# Patient Record
Sex: FEMALE | Race: WHITE | Employment: OTHER | URBAN - METROPOLITAN AREA
[De-identification: names, ages, dates, MRNs, and addresses within clinical notes are randomized per-mention and may not be internally consistent; named-entity substitution may affect disease eponyms.]

---

## 2017-01-04 ENCOUNTER — TELEPHONE (OUTPATIENT)
Dept: FAMILY MEDICINE CLINIC | Age: 32
End: 2017-01-04

## 2017-01-10 ENCOUNTER — OFFICE VISIT (OUTPATIENT)
Dept: PSYCHOLOGY | Age: 32
End: 2017-01-10

## 2017-01-10 DIAGNOSIS — F33.41 MAJOR DEPRESSIVE DISORDER, RECURRENT, IN PARTIAL REMISSION (HCC): Primary | ICD-10-CM

## 2017-01-10 DIAGNOSIS — F41.9 ANXIETY DISORDER, UNSPECIFIED TYPE: ICD-10-CM

## 2017-01-10 PROCEDURE — 90832 PSYTX W PT 30 MINUTES: CPT | Performed by: PSYCHOLOGIST

## 2017-01-10 ASSESSMENT — PATIENT HEALTH QUESTIONNAIRE - PHQ9
SUM OF ALL RESPONSES TO PHQ QUESTIONS 1-9: 8
2. FEELING DOWN, DEPRESSED OR HOPELESS: 1
SUM OF ALL RESPONSES TO PHQ9 QUESTIONS 1 & 2: 2
4. FEELING TIRED OR HAVING LITTLE ENERGY: 1
1. LITTLE INTEREST OR PLEASURE IN DOING THINGS: 1
10. IF YOU CHECKED OFF ANY PROBLEMS, HOW DIFFICULT HAVE THESE PROBLEMS MADE IT FOR YOU TO DO YOUR WORK, TAKE CARE OF THINGS AT HOME, OR GET ALONG WITH OTHER PEOPLE: 1
5. POOR APPETITE OR OVEREATING: 2
8. MOVING OR SPEAKING SO SLOWLY THAT OTHER PEOPLE COULD HAVE NOTICED. OR THE OPPOSITE, BEING SO FIGETY OR RESTLESS THAT YOU HAVE BEEN MOVING AROUND A LOT MORE THAN USUAL: 0
6. FEELING BAD ABOUT YOURSELF - OR THAT YOU ARE A FAILURE OR HAVE LET YOURSELF OR YOUR FAMILY DOWN: 0
9. THOUGHTS THAT YOU WOULD BE BETTER OFF DEAD, OR OF HURTING YOURSELF: 0
3. TROUBLE FALLING OR STAYING ASLEEP: 1
7. TROUBLE CONCENTRATING ON THINGS, SUCH AS READING THE NEWSPAPER OR WATCHING TELEVISION: 2

## 2017-02-11 DIAGNOSIS — M79.2 NEUROGENIC PAIN, LEG, UNSPECIFIED LATERALITY: ICD-10-CM

## 2017-02-11 RX ORDER — PREGABALIN 100 MG/1
100 CAPSULE ORAL 2 TIMES DAILY
Qty: 180 CAPSULE | Refills: 1 | Status: SHIPPED | OUTPATIENT
Start: 2017-02-11 | End: 2017-07-28 | Stop reason: SDUPTHER

## 2017-02-14 ENCOUNTER — OFFICE VISIT (OUTPATIENT)
Dept: FAMILY MEDICINE CLINIC | Age: 32
End: 2017-02-14

## 2017-02-14 VITALS
HEART RATE: 69 BPM | WEIGHT: 194.8 LBS | BODY MASS INDEX: 32.42 KG/M2 | DIASTOLIC BLOOD PRESSURE: 86 MMHG | TEMPERATURE: 97.4 F | SYSTOLIC BLOOD PRESSURE: 122 MMHG | OXYGEN SATURATION: 99 %

## 2017-02-14 DIAGNOSIS — J01.10 ACUTE NON-RECURRENT FRONTAL SINUSITIS: Primary | ICD-10-CM

## 2017-02-14 DIAGNOSIS — F33.1 MAJOR DEPRESSIVE DISORDER, RECURRENT, MODERATE (HCC): ICD-10-CM

## 2017-02-14 DIAGNOSIS — S76.311A HAMSTRING STRAIN, RIGHT, INITIAL ENCOUNTER: ICD-10-CM

## 2017-02-14 DIAGNOSIS — T75.3XXA MOTION SICKNESS, INITIAL ENCOUNTER: ICD-10-CM

## 2017-02-14 DIAGNOSIS — G61.0 AIDP (ACUTE INFLAMMATORY DEMYELINATING POLYNEUROPATHY) (HCC): ICD-10-CM

## 2017-02-14 PROCEDURE — 99214 OFFICE O/P EST MOD 30 MIN: CPT | Performed by: FAMILY MEDICINE

## 2017-02-14 RX ORDER — SCOLOPAMINE TRANSDERMAL SYSTEM 1 MG/1
1 PATCH, EXTENDED RELEASE TRANSDERMAL
Qty: 4 PATCH | Refills: 1 | Status: SHIPPED | OUTPATIENT
Start: 2017-02-14 | End: 2017-05-16

## 2017-02-14 RX ORDER — SULFAMETHOXAZOLE AND TRIMETHOPRIM 800; 160 MG/1; MG/1
1 TABLET ORAL 2 TIMES DAILY
Qty: 20 TABLET | Refills: 0 | Status: SHIPPED | OUTPATIENT
Start: 2017-02-14 | End: 2017-03-13 | Stop reason: SDUPTHER

## 2017-02-19 ASSESSMENT — ENCOUNTER SYMPTOMS
GASTROINTESTINAL NEGATIVE: 1
EYES NEGATIVE: 1
RESPIRATORY NEGATIVE: 1

## 2017-02-28 ENCOUNTER — OFFICE VISIT (OUTPATIENT)
Dept: PSYCHOLOGY | Age: 32
End: 2017-02-28

## 2017-02-28 DIAGNOSIS — F41.9 ANXIETY DISORDER, UNSPECIFIED TYPE: ICD-10-CM

## 2017-02-28 DIAGNOSIS — F33.41 MAJOR DEPRESSIVE DISORDER, RECURRENT, IN PARTIAL REMISSION (HCC): Primary | ICD-10-CM

## 2017-02-28 PROCEDURE — 90832 PSYTX W PT 30 MINUTES: CPT | Performed by: PSYCHOLOGIST

## 2017-02-28 ASSESSMENT — PATIENT HEALTH QUESTIONNAIRE - PHQ9
9. THOUGHTS THAT YOU WOULD BE BETTER OFF DEAD, OR OF HURTING YOURSELF: 0
6. FEELING BAD ABOUT YOURSELF - OR THAT YOU ARE A FAILURE OR HAVE LET YOURSELF OR YOUR FAMILY DOWN: 0
3. TROUBLE FALLING OR STAYING ASLEEP: 1
8. MOVING OR SPEAKING SO SLOWLY THAT OTHER PEOPLE COULD HAVE NOTICED. OR THE OPPOSITE, BEING SO FIGETY OR RESTLESS THAT YOU HAVE BEEN MOVING AROUND A LOT MORE THAN USUAL: 0
SUM OF ALL RESPONSES TO PHQ QUESTIONS 1-9: 6
1. LITTLE INTEREST OR PLEASURE IN DOING THINGS: 1
2. FEELING DOWN, DEPRESSED OR HOPELESS: 1
SUM OF ALL RESPONSES TO PHQ9 QUESTIONS 1 & 2: 2
4. FEELING TIRED OR HAVING LITTLE ENERGY: 1
7. TROUBLE CONCENTRATING ON THINGS, SUCH AS READING THE NEWSPAPER OR WATCHING TELEVISION: 2
5. POOR APPETITE OR OVEREATING: 0

## 2017-03-13 ENCOUNTER — PATIENT MESSAGE (OUTPATIENT)
Dept: FAMILY MEDICINE CLINIC | Age: 32
End: 2017-03-13

## 2017-03-13 DIAGNOSIS — J01.10 ACUTE NON-RECURRENT FRONTAL SINUSITIS: ICD-10-CM

## 2017-03-13 RX ORDER — SULFAMETHOXAZOLE AND TRIMETHOPRIM 800; 160 MG/1; MG/1
1 TABLET ORAL 2 TIMES DAILY
Qty: 20 TABLET | Refills: 0 | Status: SHIPPED | OUTPATIENT
Start: 2017-03-13 | End: 2017-03-23

## 2017-04-04 ENCOUNTER — OFFICE VISIT (OUTPATIENT)
Dept: PSYCHOLOGY | Age: 32
End: 2017-04-04

## 2017-04-04 DIAGNOSIS — F41.9 ANXIETY DISORDER, UNSPECIFIED TYPE: ICD-10-CM

## 2017-04-04 DIAGNOSIS — F33.41 MAJOR DEPRESSIVE DISORDER, RECURRENT, IN PARTIAL REMISSION (HCC): Primary | ICD-10-CM

## 2017-04-04 PROCEDURE — 90832 PSYTX W PT 30 MINUTES: CPT | Performed by: PSYCHOLOGIST

## 2017-04-04 ASSESSMENT — PATIENT HEALTH QUESTIONNAIRE - PHQ9
2. FEELING DOWN, DEPRESSED OR HOPELESS: 0
SUM OF ALL RESPONSES TO PHQ9 QUESTIONS 1 & 2: 0
4. FEELING TIRED OR HAVING LITTLE ENERGY: 1
10. IF YOU CHECKED OFF ANY PROBLEMS, HOW DIFFICULT HAVE THESE PROBLEMS MADE IT FOR YOU TO DO YOUR WORK, TAKE CARE OF THINGS AT HOME, OR GET ALONG WITH OTHER PEOPLE: 0
1. LITTLE INTEREST OR PLEASURE IN DOING THINGS: 0
SUM OF ALL RESPONSES TO PHQ QUESTIONS 1-9: 4
7. TROUBLE CONCENTRATING ON THINGS, SUCH AS READING THE NEWSPAPER OR WATCHING TELEVISION: 1
9. THOUGHTS THAT YOU WOULD BE BETTER OFF DEAD, OR OF HURTING YOURSELF: 0
8. MOVING OR SPEAKING SO SLOWLY THAT OTHER PEOPLE COULD HAVE NOTICED. OR THE OPPOSITE, BEING SO FIGETY OR RESTLESS THAT YOU HAVE BEEN MOVING AROUND A LOT MORE THAN USUAL: 0
3. TROUBLE FALLING OR STAYING ASLEEP: 1
5. POOR APPETITE OR OVEREATING: 1
6. FEELING BAD ABOUT YOURSELF - OR THAT YOU ARE A FAILURE OR HAVE LET YOURSELF OR YOUR FAMILY DOWN: 0

## 2017-04-21 DIAGNOSIS — F33.1 MAJOR DEPRESSIVE DISORDER, RECURRENT, MODERATE (HCC): ICD-10-CM

## 2017-04-21 RX ORDER — SERTRALINE HYDROCHLORIDE 25 MG/1
25 TABLET, FILM COATED ORAL DAILY
Qty: 90 TABLET | Refills: 1 | Status: SHIPPED | OUTPATIENT
Start: 2017-04-21 | End: 2017-10-30 | Stop reason: SDUPTHER

## 2017-05-16 ENCOUNTER — OFFICE VISIT (OUTPATIENT)
Dept: FAMILY MEDICINE CLINIC | Age: 32
End: 2017-05-16

## 2017-05-16 VITALS
BODY MASS INDEX: 32.18 KG/M2 | DIASTOLIC BLOOD PRESSURE: 80 MMHG | TEMPERATURE: 97.8 F | WEIGHT: 193.4 LBS | SYSTOLIC BLOOD PRESSURE: 124 MMHG

## 2017-05-16 DIAGNOSIS — G61.0 AIDP (ACUTE INFLAMMATORY DEMYELINATING POLYNEUROPATHY) (HCC): ICD-10-CM

## 2017-05-16 DIAGNOSIS — F33.41 MAJOR DEPRESSIVE DISORDER, RECURRENT, IN PARTIAL REMISSION (HCC): ICD-10-CM

## 2017-05-16 DIAGNOSIS — M51.36 DEGENERATIVE DISC DISEASE, LUMBAR: Primary | ICD-10-CM

## 2017-05-16 DIAGNOSIS — J30.1 SEASONAL ALLERGIC RHINITIS DUE TO POLLEN: ICD-10-CM

## 2017-05-16 PROCEDURE — 99214 OFFICE O/P EST MOD 30 MIN: CPT | Performed by: FAMILY MEDICINE

## 2017-05-16 RX ORDER — FLUTICASONE PROPIONATE 50 MCG
2 SPRAY, SUSPENSION (ML) NASAL DAILY
Qty: 1 BOTTLE | Refills: 3 | Status: SHIPPED | OUTPATIENT
Start: 2017-05-16 | End: 2018-02-28

## 2017-05-17 ASSESSMENT — ENCOUNTER SYMPTOMS
RESPIRATORY NEGATIVE: 1
EYES NEGATIVE: 1
GASTROINTESTINAL NEGATIVE: 1

## 2017-05-31 DIAGNOSIS — J45.30 REACTIVE AIRWAY DISEASE, MILD PERSISTENT, UNCOMPLICATED: Primary | ICD-10-CM

## 2017-05-31 RX ORDER — ALBUTEROL SULFATE 90 UG/1
2 AEROSOL, METERED RESPIRATORY (INHALATION) EVERY 6 HOURS PRN
Qty: 3 INHALER | Refills: 1 | Status: SHIPPED | OUTPATIENT
Start: 2017-05-31 | End: 2017-12-26 | Stop reason: SDUPTHER

## 2017-07-28 DIAGNOSIS — M79.2 NEUROGENIC PAIN, LEG, UNSPECIFIED LATERALITY: ICD-10-CM

## 2017-07-28 RX ORDER — PREGABALIN 100 MG/1
100 CAPSULE ORAL 2 TIMES DAILY
Qty: 60 CAPSULE | Refills: 0 | Status: SHIPPED | OUTPATIENT
Start: 2017-07-28 | End: 2017-10-08 | Stop reason: SDUPTHER

## 2017-07-31 ENCOUNTER — OFFICE VISIT (OUTPATIENT)
Dept: FAMILY MEDICINE CLINIC | Age: 32
End: 2017-07-31

## 2017-07-31 VITALS
BODY MASS INDEX: 36.29 KG/M2 | DIASTOLIC BLOOD PRESSURE: 80 MMHG | OXYGEN SATURATION: 97 % | WEIGHT: 204.8 LBS | HEIGHT: 63 IN | SYSTOLIC BLOOD PRESSURE: 120 MMHG | TEMPERATURE: 97.5 F | HEART RATE: 98 BPM

## 2017-07-31 DIAGNOSIS — M79.2 NEUROGENIC PAIN, LEG, UNSPECIFIED LATERALITY: Primary | ICD-10-CM

## 2017-07-31 PROCEDURE — 99213 OFFICE O/P EST LOW 20 MIN: CPT | Performed by: NURSE PRACTITIONER

## 2017-07-31 ASSESSMENT — ENCOUNTER SYMPTOMS
RESPIRATORY NEGATIVE: 1
EYES NEGATIVE: 1
ALLERGIC/IMMUNOLOGIC NEGATIVE: 1
GASTROINTESTINAL NEGATIVE: 1

## 2017-10-08 DIAGNOSIS — M79.2 NEUROGENIC PAIN, LEG, UNSPECIFIED LATERALITY: ICD-10-CM

## 2017-10-09 RX ORDER — PREGABALIN 100 MG/1
100 CAPSULE ORAL 2 TIMES DAILY
Qty: 60 CAPSULE | Refills: 0 | Status: SHIPPED | OUTPATIENT
Start: 2017-10-09 | End: 2018-02-28

## 2017-10-23 ENCOUNTER — TELEPHONE (OUTPATIENT)
Dept: FAMILY MEDICINE CLINIC | Age: 32
End: 2017-10-23

## 2017-10-26 ENCOUNTER — TELEPHONE (OUTPATIENT)
Dept: FAMILY MEDICINE CLINIC | Age: 32
End: 2017-10-26

## 2017-10-30 DIAGNOSIS — F33.1 MAJOR DEPRESSIVE DISORDER, RECURRENT, MODERATE (HCC): ICD-10-CM

## 2017-10-30 RX ORDER — SERTRALINE HYDROCHLORIDE 25 MG/1
25 TABLET, FILM COATED ORAL DAILY
Qty: 90 TABLET | Refills: 1 | Status: SHIPPED | OUTPATIENT
Start: 2017-10-30 | End: 2018-05-11 | Stop reason: SDUPTHER

## 2017-10-30 NOTE — TELEPHONE ENCOUNTER
From: Zehra May  Sent: 10/30/2017 1:29 PM EDT  Subject: Medication Renewal Request    Myrna Sterling. Nguyen Rosales would like a refill of the following medications:  sertraline (ZOLOFT) 25 MG tablet Julianna Cowart MD]    Preferred pharmacy: [address] [place] [address] [phone] - F [phone]    Comment:  I didn't realize I was out of refills and I am completely out. Sorry! Thank you! I have also transferred this rx to the [facility] on [address][phone]. They should be sending over a request as well. Thank you!

## 2017-12-18 ENCOUNTER — TELEPHONE (OUTPATIENT)
Dept: FAMILY MEDICINE CLINIC | Age: 32
End: 2017-12-18

## 2017-12-18 RX ORDER — SULFAMETHOXAZOLE AND TRIMETHOPRIM 800; 160 MG/1; MG/1
1 TABLET ORAL 2 TIMES DAILY
Qty: 20 TABLET | Refills: 0 | Status: SHIPPED | OUTPATIENT
Start: 2017-12-18 | End: 2017-12-28

## 2017-12-26 DIAGNOSIS — J45.30 REACTIVE AIRWAY DISEASE, MILD PERSISTENT, UNCOMPLICATED: ICD-10-CM

## 2017-12-26 RX ORDER — ALBUTEROL SULFATE 90 UG/1
2 AEROSOL, METERED RESPIRATORY (INHALATION) EVERY 6 HOURS PRN
Qty: 3 INHALER | Refills: 1 | Status: SHIPPED | OUTPATIENT
Start: 2017-12-26 | End: 2018-11-13 | Stop reason: SDUPTHER

## 2017-12-26 NOTE — TELEPHONE ENCOUNTER
From: Becca Aponte  Sent: 12/26/2017 11:49 AM EST  Subject: Medication Renewal Request    Dank Robertson.  Tish Fairbanks would like a refill of the following medications:  albuterol sulfate HFA (PROVENTIL HFA) 108 (90 BASE) MCG/ACT inhaler Juanjose Interiano MD]    Preferred pharmacy: [address] [phone] - F [phone]    Comment:

## 2018-02-26 ENCOUNTER — TELEPHONE (OUTPATIENT)
Dept: FAMILY MEDICINE CLINIC | Age: 33
End: 2018-02-26

## 2018-02-26 DIAGNOSIS — S82.899A: Primary | ICD-10-CM

## 2018-02-27 ENCOUNTER — OFFICE VISIT (OUTPATIENT)
Dept: ORTHOPEDIC SURGERY | Age: 33
End: 2018-02-27

## 2018-02-27 ENCOUNTER — TELEPHONE (OUTPATIENT)
Dept: ORTHOPEDIC SURGERY | Age: 33
End: 2018-02-27

## 2018-02-27 VITALS
HEIGHT: 63 IN | BODY MASS INDEX: 36.21 KG/M2 | WEIGHT: 204.34 LBS | SYSTOLIC BLOOD PRESSURE: 131 MMHG | DIASTOLIC BLOOD PRESSURE: 74 MMHG | HEART RATE: 78 BPM

## 2018-02-27 DIAGNOSIS — S82.851A TRIMALLEOLAR FRACTURE OF ANKLE, CLOSED, RIGHT, INITIAL ENCOUNTER: ICD-10-CM

## 2018-02-27 DIAGNOSIS — M25.571 ACUTE RIGHT ANKLE PAIN: Primary | ICD-10-CM

## 2018-02-27 PROCEDURE — G8417 CALC BMI ABV UP PARAM F/U: HCPCS | Performed by: PODIATRIST

## 2018-02-27 PROCEDURE — 29515 APPLICATION SHORT LEG SPLINT: CPT | Performed by: PODIATRIST

## 2018-02-27 PROCEDURE — L4360 PNEUMAT WALKING BOOT PRE CST: HCPCS | Performed by: PODIATRIST

## 2018-02-27 PROCEDURE — 1036F TOBACCO NON-USER: CPT | Performed by: PODIATRIST

## 2018-02-27 PROCEDURE — G8427 DOCREV CUR MEDS BY ELIG CLIN: HCPCS | Performed by: PODIATRIST

## 2018-02-27 PROCEDURE — 99203 OFFICE O/P NEW LOW 30 MIN: CPT | Performed by: PODIATRIST

## 2018-02-27 PROCEDURE — G8484 FLU IMMUNIZE NO ADMIN: HCPCS | Performed by: PODIATRIST

## 2018-02-28 ENCOUNTER — OFFICE VISIT (OUTPATIENT)
Dept: FAMILY MEDICINE CLINIC | Age: 33
End: 2018-02-28

## 2018-02-28 ENCOUNTER — TELEPHONE (OUTPATIENT)
Dept: FAMILY MEDICINE CLINIC | Age: 33
End: 2018-02-28

## 2018-02-28 VITALS
OXYGEN SATURATION: 96 % | HEART RATE: 101 BPM | TEMPERATURE: 98.4 F | DIASTOLIC BLOOD PRESSURE: 60 MMHG | HEIGHT: 65 IN | WEIGHT: 190 LBS | SYSTOLIC BLOOD PRESSURE: 120 MMHG | BODY MASS INDEX: 31.65 KG/M2

## 2018-02-28 DIAGNOSIS — J30.1 SEASONAL ALLERGIC RHINITIS DUE TO POLLEN, UNSPECIFIED CHRONICITY: ICD-10-CM

## 2018-02-28 DIAGNOSIS — G61.0 GUILLAIN BARRÉ SYNDROME (HCC): ICD-10-CM

## 2018-02-28 DIAGNOSIS — F33.41 MAJOR DEPRESSIVE DISORDER, RECURRENT, IN PARTIAL REMISSION (HCC): ICD-10-CM

## 2018-02-28 DIAGNOSIS — S82.851A TRIMALLEOLAR FRACTURE OF ANKLE, CLOSED, RIGHT, INITIAL ENCOUNTER: ICD-10-CM

## 2018-02-28 DIAGNOSIS — F41.9 ANXIETY DISORDER, UNSPECIFIED TYPE: ICD-10-CM

## 2018-02-28 DIAGNOSIS — M79.2 NEUROGENIC PAIN OF LOWER EXTREMITY, UNSPECIFIED LATERALITY: ICD-10-CM

## 2018-02-28 DIAGNOSIS — J45.909 REACTIVE AIRWAY DISEASE WITHOUT COMPLICATION, UNSPECIFIED ASTHMA SEVERITY, UNSPECIFIED WHETHER PERSISTENT: ICD-10-CM

## 2018-02-28 DIAGNOSIS — Z01.818 PREOP EXAMINATION: Primary | ICD-10-CM

## 2018-02-28 DIAGNOSIS — R56.9 SEIZURE (HCC): ICD-10-CM

## 2018-02-28 DIAGNOSIS — I44.7 LBBB (LEFT BUNDLE BRANCH BLOCK): ICD-10-CM

## 2018-02-28 DIAGNOSIS — G61.0 AIDP (ACUTE INFLAMMATORY DEMYELINATING POLYNEUROPATHY) (HCC): ICD-10-CM

## 2018-02-28 DIAGNOSIS — R05.9 COUGH: ICD-10-CM

## 2018-02-28 RX ORDER — ONDANSETRON 4 MG/1
4 TABLET, FILM COATED ORAL EVERY 8 HOURS PRN
COMMUNITY
End: 2018-11-13

## 2018-02-28 RX ORDER — OXYCODONE HYDROCHLORIDE AND ACETAMINOPHEN 5; 325 MG/1; MG/1
1 TABLET ORAL EVERY 4 HOURS PRN
Qty: 10 TABLET | Refills: 0 | Status: SHIPPED | OUTPATIENT
Start: 2018-02-28 | End: 2018-03-02 | Stop reason: HOSPADM

## 2018-02-28 RX ORDER — OXYCODONE HYDROCHLORIDE AND ACETAMINOPHEN 5; 325 MG/1; MG/1
1 TABLET ORAL EVERY 4 HOURS PRN
COMMUNITY
End: 2018-02-28 | Stop reason: SDUPTHER

## 2018-02-28 RX ORDER — BROMPHENIRAMINE MALEATE, PSEUDOEPHEDRINE HYDROCHLORIDE, AND DEXTROMETHORPHAN HYDROBROMIDE 2; 30; 10 MG/5ML; MG/5ML; MG/5ML
5 SYRUP ORAL 4 TIMES DAILY PRN
Qty: 200 ML | Refills: 0 | Status: SHIPPED | OUTPATIENT
Start: 2018-02-28 | End: 2018-11-13

## 2018-02-28 NOTE — PROGRESS NOTES
Subjective:      Roldan Bowman is a 29 y.o.  female who presents to the office today for a preoperative consultation at the request of surgeon Chung Martinez who plans on performing OPEN REDUCTION INTERNAL FIXATION TRIMALLEOLAR FRACTURE RIGHT ANKLE  on March 2, friday. This consultation is requested for the specific conditions prompting preoperative evaluation (i.e. because of potential affect on operative risk): anesthesia. Planned anesthesia is General.  The patient has the following known anesthesia issues: none  Patient has a bleeding risk of : no recent abnormal bleeding.   Past Medical History:   Diagnosis Date    Chicken pox     Degenerative disc disease, lumbar     Guillain Barré syndrome ([facility] 8/4/2016    Kidney disease     Meniere's disease     Seizures ([facility]     Trimalleolar fracture of ankle, closed, right, initial encounter 2/27/2018     Patient Active Problem List    Diagnosis Date Noted    Trimalleolar fracture of ankle, closed, right, initial encounter 02/27/2018    Major depressive disorder, recurrent, in partial remission ([address] 08/19/2016    Anxiety disorder 08/19/2016    AIDP (acute inflammatory demyelinating polyneuropathy) ([address] 08/04/2016    Guillain Barré syndrome ([address] 08/04/2016    Ataxia of both legs 09/09/2015    Neurogenic pain, leg 09/09/2015    LBBB (left bundle branch block) 08/12/2015    Racing heart beat 08/12/2015    Discogenic low back pain 01/20/2014    Degenerative disc disease, lumbar     Seizure ([address] 05/07/2012    Vitamin D deficiency 02/11/2012    Meniere disease 02/10/2012    Hyperlipemia 07/29/2010    Routine general medical examination at a health care facility 07/29/2010    Reactive airway disease 07/29/2010    Allergic rhinitis due to pollen 07/29/2010    Eczema 07/29/2010     Past Surgical History:   Procedure Laterality Date    TONSILLECTOMY AND ADENOIDECTOMY  1994    WISDOM TOOTH EXTRACTION       Family History   Problem Relation atraumatic   Eyes:  PERRL, conjunctiva/corneas clear, EOM's intact, fundi benign, both eyes   Ears:  Normal TM's and external ear canals, both ears   Nose: Nares normal, septum midline,mucosa normal, no drainage or sinus tenderness   Throat: Lips, mucosa, and tongue normal; teeth and gums normal   Neck: Supple, symmetrical, trachea midline, no adenopathy;  thyroid: not enlarged, symmetric, no tenderness/mass/nodules; no carotid bruit or JVD   Back:   Symmetric, no curvature, ROM normal, no CVA tenderness   Lungs:   Clear to auscultation bilaterally, respirations unlabored   Heart:  Regular rate and rhythm, S1 and S2 normal, no murmur, rub, or gallop   Abdomen:   Soft, non-tender, bowel sounds active all four quadrants,  no masses, no organomegaly   Pelvic: Deferred   Extremities: Extremities normal, atraumatic, no cyanosis or edema, RLE in boot, toes pink and warm   Pulses: 2+ and symmetric   Skin: Skin color, texture, turgor normal, no rashes or lesions   Lymph nodes: Cervical, supraclavicular, and axillary nodes normal   Neurologic: Normal           Assessment:      29 y.o. female with planned surgery as above. Plan:         1. Preop examination  Cleared for surgery    2. Trimalleolar fracture of ankle, closed, right, initial encounter  The condition has deteriorated. It has failed conservative management and needs definitive surgical repair. I recommended that the patient wash entire body with Hibiclens soap daily starting three days before surgery. oxyCODONE-acetaminophen (PERCOCET) 5-325 MG per tablet   3. Reactive airway disease without complication, unspecified asthma severity, unspecified whether persistent  Stable on current therapy, will monitor      4. Cough  Stable at this time, will monitor   brompheniramine-pseudoephedrine-DM 30-2-10 MG/5ML syrup   5. Seizure ([address]  Stable at this time, will monitor      6.  Seasonal allergic rhinitis due to pollen, unspecified chronicity  Stable at this time,

## 2018-03-02 ENCOUNTER — HOSPITAL ENCOUNTER (OUTPATIENT)
Dept: SURGERY | Age: 33
Discharge: HOME | End: 2018-03-02
Attending: PODIATRIST | Admitting: PODIATRIST

## 2018-03-02 VITALS
RESPIRATION RATE: 18 BRPM | TEMPERATURE: 98.4 F | SYSTOLIC BLOOD PRESSURE: 151 MMHG | HEART RATE: 100 BPM | OXYGEN SATURATION: 92 % | DIASTOLIC BLOOD PRESSURE: 94 MMHG

## 2018-03-02 DIAGNOSIS — S82.841A BIMALLEOLAR ANKLE FRACTURE, RIGHT, CLOSED, INITIAL ENCOUNTER: Primary | ICD-10-CM

## 2018-03-02 LAB — PREGNANCY, URINE: NEGATIVE

## 2018-03-02 RX ORDER — LABETALOL HYDROCHLORIDE 5 MG/ML
5 INJECTION, SOLUTION INTRAVENOUS EVERY 10 MIN PRN
Status: DISCONTINUED | OUTPATIENT
Start: 2018-03-02 | End: 2018-03-03 | Stop reason: HOSPADM

## 2018-03-02 RX ORDER — OXYCODONE HYDROCHLORIDE AND ACETAMINOPHEN 5; 325 MG/1; MG/1
TABLET ORAL
Status: COMPLETED
Start: 2018-03-02 | End: 2018-03-02

## 2018-03-02 RX ORDER — OXYCODONE HYDROCHLORIDE AND ACETAMINOPHEN 5; 325 MG/1; MG/1
1 TABLET ORAL PRN
Status: COMPLETED | OUTPATIENT
Start: 2018-03-02 | End: 2018-03-02

## 2018-03-02 RX ORDER — BENZONATATE 100 MG/1
100 CAPSULE ORAL 3 TIMES DAILY PRN
Status: DISCONTINUED | OUTPATIENT
Start: 2018-03-02 | End: 2018-03-02

## 2018-03-02 RX ORDER — HYDRALAZINE HYDROCHLORIDE 20 MG/ML
5 INJECTION INTRAMUSCULAR; INTRAVENOUS EVERY 10 MIN PRN
Status: DISCONTINUED | OUTPATIENT
Start: 2018-03-02 | End: 2018-03-03 | Stop reason: HOSPADM

## 2018-03-02 RX ORDER — OXYCODONE HYDROCHLORIDE AND ACETAMINOPHEN 5; 325 MG/1; MG/1
2 TABLET ORAL PRN
Status: COMPLETED | OUTPATIENT
Start: 2018-03-02 | End: 2018-03-02

## 2018-03-02 RX ORDER — SODIUM CHLORIDE, SODIUM LACTATE, POTASSIUM CHLORIDE, CALCIUM CHLORIDE 600; 310; 30; 20 MG/100ML; MG/100ML; MG/100ML; MG/100ML
INJECTION, SOLUTION INTRAVENOUS CONTINUOUS
Status: DISCONTINUED | OUTPATIENT
Start: 2018-03-02 | End: 2018-03-03 | Stop reason: HOSPADM

## 2018-03-02 RX ORDER — DIPHENHYDRAMINE HYDROCHLORIDE 50 MG/ML
12.5 INJECTION INTRAMUSCULAR; INTRAVENOUS
Status: ACTIVE | OUTPATIENT
Start: 2018-03-02 | End: 2018-03-02

## 2018-03-02 RX ORDER — MORPHINE SULFATE 2 MG/ML
1 INJECTION, SOLUTION INTRAMUSCULAR; INTRAVENOUS EVERY 5 MIN PRN
Status: DISCONTINUED | OUTPATIENT
Start: 2018-03-02 | End: 2018-03-03 | Stop reason: HOSPADM

## 2018-03-02 RX ORDER — MIDAZOLAM HYDROCHLORIDE 1 MG/ML
0.5 INJECTION INTRAMUSCULAR; INTRAVENOUS
Status: DISCONTINUED | OUTPATIENT
Start: 2018-03-02 | End: 2018-03-02 | Stop reason: ALTCHOICE

## 2018-03-02 RX ORDER — MIDAZOLAM HYDROCHLORIDE 1 MG/ML
INJECTION INTRAMUSCULAR; INTRAVENOUS
Status: DISCONTINUED
Start: 2018-03-02 | End: 2018-03-03 | Stop reason: HOSPADM

## 2018-03-02 RX ORDER — MEPERIDINE HYDROCHLORIDE 50 MG/ML
12.5 INJECTION INTRAMUSCULAR; INTRAVENOUS; SUBCUTANEOUS EVERY 5 MIN PRN
Status: DISCONTINUED | OUTPATIENT
Start: 2018-03-02 | End: 2018-03-03 | Stop reason: HOSPADM

## 2018-03-02 RX ORDER — PROMETHAZINE HYDROCHLORIDE 25 MG/1
25 TABLET ORAL EVERY 6 HOURS PRN
Qty: 30 TABLET | Refills: 0 | Status: SHIPPED | OUTPATIENT
Start: 2018-03-02 | End: 2018-03-09

## 2018-03-02 RX ORDER — OXYCODONE HYDROCHLORIDE AND ACETAMINOPHEN 5; 325 MG/1; MG/1
1 TABLET ORAL EVERY 4 HOURS PRN
Qty: 24 TABLET | Refills: 0 | Status: SHIPPED | OUTPATIENT
Start: 2018-03-02 | End: 2018-03-06

## 2018-03-02 RX ORDER — ONDANSETRON 2 MG/ML
4 INJECTION INTRAMUSCULAR; INTRAVENOUS
Status: ACTIVE | OUTPATIENT
Start: 2018-03-02 | End: 2018-03-02

## 2018-03-02 RX ORDER — MORPHINE SULFATE 2 MG/ML
2 INJECTION, SOLUTION INTRAMUSCULAR; INTRAVENOUS EVERY 5 MIN PRN
Status: DISCONTINUED | OUTPATIENT
Start: 2018-03-02 | End: 2018-03-03 | Stop reason: HOSPADM

## 2018-03-02 RX ORDER — BENZONATATE 100 MG/1
100 CAPSULE ORAL ONCE
Status: COMPLETED | OUTPATIENT
Start: 2018-03-02 | End: 2018-03-02

## 2018-03-02 RX ADMIN — SODIUM CHLORIDE, SODIUM LACTATE, POTASSIUM CHLORIDE, CALCIUM CHLORIDE: 600; 310; 30; 20 INJECTION, SOLUTION INTRAVENOUS at 09:30

## 2018-03-02 RX ADMIN — BENZONATATE 100 MG: 100 CAPSULE ORAL at 10:49

## 2018-03-02 RX ADMIN — OXYCODONE HYDROCHLORIDE AND ACETAMINOPHEN 2 TABLET: 5; 325 TABLET ORAL at 14:52

## 2018-03-02 RX ADMIN — Medication 0.5 MG: at 13:44

## 2018-03-02 RX ADMIN — Medication 0.5 MG: at 13:32

## 2018-03-02 RX ADMIN — MIDAZOLAM HYDROCHLORIDE 0.5 MG: 1 INJECTION INTRAMUSCULAR; INTRAVENOUS at 14:09

## 2018-03-02 RX ADMIN — Medication 0.5 MG: at 13:47

## 2018-03-02 RX ADMIN — Medication 1 MG: at 13:52

## 2018-03-02 RX ADMIN — SODIUM CHLORIDE, SODIUM LACTATE, POTASSIUM CHLORIDE, CALCIUM CHLORIDE: 600; 310; 30; 20 INJECTION, SOLUTION INTRAVENOUS at 13:57

## 2018-03-02 RX ADMIN — Medication 0.5 MG: at 13:37

## 2018-03-02 ASSESSMENT — PAIN SCALES - GENERAL
PAINLEVEL_OUTOF10: 5
PAINLEVEL_OUTOF10: 8
PAINLEVEL_OUTOF10: 8
PAINLEVEL_OUTOF10: 0
PAINLEVEL_OUTOF10: 6
PAINLEVEL_OUTOF10: 6
PAINLEVEL_OUTOF10: 7
PAINLEVEL_OUTOF10: 8
PAINLEVEL_OUTOF10: 9
PAINLEVEL_OUTOF10: 6
PAINLEVEL_OUTOF10: 5
PAINLEVEL_OUTOF10: 9
PAINLEVEL_OUTOF10: 5
PAINLEVEL_OUTOF10: 0
PAINLEVEL_OUTOF10: 8

## 2018-03-02 NOTE — ANESTHESIA PRE-OP
101 07/23/2015 05:48 AM    CO2 27 07/23/2015 05:48 AM    BUN 11 07/23/2015 05:48 AM    CREATININE 0.60 07/23/2015 05:48 AM    GLUCOSE 99 07/23/2015 05:48 AM    GLUCOSE 80 07/08/2011 03:04 PM     COAGS  No results found for: PROTIME, INR, APTT     Anesthesia Plan      general     ASA 3     (Risks, benefits and alternatives of GA discussed with pt. Questions answered. Willing to proceed. We discussed the riskds of peripheral nerve blocks being too great to justify their administration for this surgery.)  Induction: intravenous. MIPS: Postoperative opioids intended. Anesthetic plan and risks discussed with patient.                       Shon Lassiter MD   3/2/2018

## 2018-03-02 NOTE — BRIEF OP NOTE
Brief Postoperative Note    Augustus Monge  YOB: 1985  [id number]    Pre-operative Diagnosis: Bimalleolar Ankle Fracture, right    Post-operative Diagnosis: Same    Procedure: Bimalleolar Ankle Fracture open reduction internal fixation, right    Anesthesia: General    Surgeons/Assistants:  Eagle Wallace DPM    Estimated Blood Loss: less than 50     Complications: None    Specimens: Was Not Obtained    Findings: syndesmotic instability noted    Electronically signed by Eddie Timmons DPM on 3/2/2018 at 12:57 PM

## 2018-03-02 NOTE — ANESTHESIA POST-OP
Postoperative Anesthesia Note    Name:    Nola Luong  MRN:      [id number]    Patient Vitals for the past 12 hrs:   BP Temp Temp src Pulse Resp SpO2   03/02/18 1455 (!) 151/94 - - 100 18 92 %   03/02/18 1450 (!) 156/84 - - 98 18 92 %   03/02/18 1445 - - - - - 92 %   03/02/18 1440 (!) 158/82 - - 103 12 92 %   03/02/18 1435 (!) 147/79 - - 108 22 93 %   03/02/18 1430 (!) 153/69 - - 97 13 93 %   03/02/18 1425 (!) 155/84 - - 111 16 94 %   03/02/18 1420 (!) 150/91 - - 119 23 92 %   03/02/18 1410 (!) 147/82 - - 111 24 91 %   03/02/18 1405 (!) 160/105 - - 107 18 96 %   03/02/18 1400 (!) 165/100 - - 105 26 96 %   03/02/18 1355 (!) 155/90 - - 115 (!) 32 95 %   03/02/18 1350 (!) 160/101 - - 113 20 99 %   03/02/18 1345 (!) 173/98 98.4 °F (36.9 °C) Temporal 114 18 99 %   03/02/18 1340 (!) 167/92 - - 117 16 96 %   03/02/18 1335 (!) 168/94 - - 117 21 92 %   03/02/18 1330 (!) 154/89 - - 111 22 92 %   03/02/18 1325 (!) 142/80 98.2 °F (36.8 °C) Temporal 108 20 90 %   03/02/18 0910 (!) 130/90 98.2 °F (36.8 °C) - 102 16 99 %        LABS:    CBC  Lab Results   Component Value Date/Time    WBC 7.8 08/04/2016 08:42 AM    HGB 15.2 08/04/2016 08:42 AM    HCT 44.9 08/04/2016 08:42 AM     08/04/2016 08:42 AM     RENAL  Lab Results   Component Value Date/Time     07/23/2015 05:48 AM    K 3.8 07/23/2015 05:48 AM     07/23/2015 05:48 AM    CO2 27 07/23/2015 05:48 AM    BUN 11 07/23/2015 05:48 AM    CREATININE 0.60 07/23/2015 05:48 AM    GLUCOSE 99 07/23/2015 05:48 AM    GLUCOSE 80 07/08/2011 03:04 PM     COAGS  No results found for: PROTIME, INR, APTT    Intake & Output: In: 800 [I.V.:800]  Out: 25     Nausea & Vomiting:  No    Level of Consciousness:  Awake    Pain Assessment:  Adequate analgesia    Anesthesia Complications:  No apparent anesthetic complications    SUMMARY      Vital signs stable  OK to discharge from Stage I post anesthesia care.   Care transferred from Anesthesiology department on discharge from perioperative area

## 2018-03-08 ENCOUNTER — OFFICE VISIT (OUTPATIENT)
Dept: ORTHOPEDIC SURGERY | Age: 33
End: 2018-03-08

## 2018-03-08 VITALS
HEART RATE: 78 BPM | HEIGHT: 65 IN | DIASTOLIC BLOOD PRESSURE: 74 MMHG | WEIGHT: 190.04 LBS | SYSTOLIC BLOOD PRESSURE: 130 MMHG | BODY MASS INDEX: 31.66 KG/M2

## 2018-03-08 DIAGNOSIS — Z09 POSTOP CHECK: ICD-10-CM

## 2018-03-08 DIAGNOSIS — S82.851D TRIMALLEOLAR FRACTURE OF ANKLE, CLOSED, RIGHT, WITH ROUTINE HEALING, SUBSEQUENT ENCOUNTER: Primary | ICD-10-CM

## 2018-03-08 PROCEDURE — 99024 POSTOP FOLLOW-UP VISIT: CPT | Performed by: PODIATRIST

## 2018-03-08 RX ORDER — OXYCODONE HYDROCHLORIDE AND ACETAMINOPHEN 5; 325 MG/1; MG/1
1 TABLET ORAL EVERY 6 HOURS PRN
Qty: 42 TABLET | Refills: 0 | Status: SHIPPED | OUTPATIENT
Start: 2018-03-08 | End: 2018-03-15

## 2018-03-15 ENCOUNTER — OFFICE VISIT (OUTPATIENT)
Dept: ORTHOPEDIC SURGERY | Age: 33
End: 2018-03-15

## 2018-03-15 VITALS
SYSTOLIC BLOOD PRESSURE: 124 MMHG | WEIGHT: 190.04 LBS | BODY MASS INDEX: 31.66 KG/M2 | DIASTOLIC BLOOD PRESSURE: 74 MMHG | HEART RATE: 68 BPM | HEIGHT: 65 IN

## 2018-03-15 DIAGNOSIS — S82.852D TRIMALLEOLAR FRACTURE OF ANKLE, CLOSED, LEFT, WITH ROUTINE HEALING, SUBSEQUENT ENCOUNTER: Primary | ICD-10-CM

## 2018-03-15 PROCEDURE — 99024 POSTOP FOLLOW-UP VISIT: CPT | Performed by: PODIATRIST

## 2018-03-15 RX ORDER — HYDROCODONE BITARTRATE AND ACETAMINOPHEN 5; 325 MG/1; MG/1
1-2 TABLET ORAL EVERY 6 HOURS PRN
Qty: 28 TABLET | Refills: 0 | Status: SHIPPED | OUTPATIENT
Start: 2018-03-15 | End: 2018-03-22

## 2018-04-05 ENCOUNTER — OFFICE VISIT (OUTPATIENT)
Dept: ORTHOPEDIC SURGERY | Age: 33
End: 2018-04-05

## 2018-04-05 VITALS
SYSTOLIC BLOOD PRESSURE: 120 MMHG | HEIGHT: 65 IN | HEART RATE: 64 BPM | DIASTOLIC BLOOD PRESSURE: 74 MMHG | WEIGHT: 190.04 LBS | BODY MASS INDEX: 31.66 KG/M2

## 2018-04-05 DIAGNOSIS — S82.851A TRIMALLEOLAR FRACTURE OF ANKLE, CLOSED, RIGHT, INITIAL ENCOUNTER: ICD-10-CM

## 2018-04-05 DIAGNOSIS — Z09 POSTOP CHECK: Primary | ICD-10-CM

## 2018-04-05 PROCEDURE — 99024 POSTOP FOLLOW-UP VISIT: CPT | Performed by: PODIATRIST

## 2018-04-11 PROBLEM — Z09 POSTOP CHECK: Status: RESOLVED | Noted: 2018-03-08 | Resolved: 2018-04-11

## 2018-05-03 ENCOUNTER — OFFICE VISIT (OUTPATIENT)
Dept: ORTHOPEDIC SURGERY | Age: 33
End: 2018-05-03

## 2018-05-03 VITALS
HEART RATE: 78 BPM | WEIGHT: 190.04 LBS | SYSTOLIC BLOOD PRESSURE: 133 MMHG | HEIGHT: 65 IN | BODY MASS INDEX: 31.66 KG/M2 | DIASTOLIC BLOOD PRESSURE: 74 MMHG

## 2018-05-03 DIAGNOSIS — Z09 POSTOP CHECK: Primary | ICD-10-CM

## 2018-05-03 DIAGNOSIS — S82.851A TRIMALLEOLAR FRACTURE OF ANKLE, CLOSED, RIGHT, INITIAL ENCOUNTER: ICD-10-CM

## 2018-05-03 PROCEDURE — 99024 POSTOP FOLLOW-UP VISIT: CPT | Performed by: PODIATRIST

## 2018-05-04 ENCOUNTER — HOSPITAL ENCOUNTER (OUTPATIENT)
Dept: PHYSICAL THERAPY | Age: 33
Discharge: HOME | End: 2018-05-31
Admitting: PODIATRIST

## 2018-05-07 ENCOUNTER — HOSPITAL ENCOUNTER (OUTPATIENT)
Dept: PHYSICAL THERAPY | Age: 33
Discharge: HOME | End: 2018-05-08
Admitting: PODIATRIST

## 2018-05-09 ENCOUNTER — HOSPITAL ENCOUNTER (OUTPATIENT)
Dept: PHYSICAL THERAPY | Age: 33
Discharge: HOME | End: 2018-05-10
Admitting: PODIATRIST

## 2018-05-11 ENCOUNTER — HOSPITAL ENCOUNTER (OUTPATIENT)
Dept: PHYSICAL THERAPY | Age: 33
Discharge: HOME | End: 2018-05-12
Admitting: PODIATRIST

## 2018-05-11 DIAGNOSIS — F33.1 MAJOR DEPRESSIVE DISORDER, RECURRENT, MODERATE (HCC): ICD-10-CM

## 2018-05-11 RX ORDER — SERTRALINE HYDROCHLORIDE 25 MG/1
25 TABLET, FILM COATED ORAL DAILY
Qty: 90 TABLET | Refills: 1 | Status: SHIPPED | OUTPATIENT
Start: 2018-05-11 | End: 2018-11-13 | Stop reason: SDUPTHER

## 2018-05-14 ENCOUNTER — HOSPITAL ENCOUNTER (OUTPATIENT)
Dept: PHYSICAL THERAPY | Age: 33
Discharge: HOME | End: 2018-05-15
Admitting: PODIATRIST

## 2018-05-16 ENCOUNTER — HOSPITAL ENCOUNTER (OUTPATIENT)
Dept: PHYSICAL THERAPY | Age: 33
Discharge: HOME | End: 2018-05-17
Admitting: PODIATRIST

## 2018-05-18 ENCOUNTER — HOSPITAL ENCOUNTER (OUTPATIENT)
Dept: PHYSICAL THERAPY | Age: 33
Discharge: HOME | End: 2018-05-19
Admitting: PODIATRIST

## 2018-05-21 ENCOUNTER — HOSPITAL ENCOUNTER (OUTPATIENT)
Dept: PHYSICAL THERAPY | Age: 33
Discharge: HOME | End: 2018-05-22
Admitting: PODIATRIST

## 2018-05-24 ENCOUNTER — OFFICE VISIT (OUTPATIENT)
Dept: ORTHOPEDIC SURGERY | Age: 33
End: 2018-05-24

## 2018-05-24 VITALS
HEIGHT: 65 IN | DIASTOLIC BLOOD PRESSURE: 74 MMHG | WEIGHT: 190.04 LBS | SYSTOLIC BLOOD PRESSURE: 130 MMHG | HEART RATE: 98 BPM | BODY MASS INDEX: 31.66 KG/M2

## 2018-05-24 DIAGNOSIS — M72.2 PLANTAR FASCIITIS, RIGHT: ICD-10-CM

## 2018-05-24 DIAGNOSIS — S82.851A TRIMALLEOLAR FRACTURE OF ANKLE, CLOSED, RIGHT, INITIAL ENCOUNTER: Primary | ICD-10-CM

## 2018-05-24 DIAGNOSIS — Z09 POSTOP CHECK: ICD-10-CM

## 2018-05-24 PROCEDURE — L3040 FT ARCH SUPRT PREMOLD LONGIT: HCPCS | Performed by: PODIATRIST

## 2018-05-24 PROCEDURE — L1902 AFO ANKLE GAUNTLET PRE OTS: HCPCS | Performed by: PODIATRIST

## 2018-05-24 PROCEDURE — 99024 POSTOP FOLLOW-UP VISIT: CPT | Performed by: PODIATRIST

## 2018-05-25 ENCOUNTER — HOSPITAL ENCOUNTER (OUTPATIENT)
Dept: PHYSICAL THERAPY | Age: 33
Discharge: HOME | End: 2018-05-26
Admitting: PODIATRIST

## 2018-05-29 ENCOUNTER — HOSPITAL ENCOUNTER (OUTPATIENT)
Dept: PHYSICAL THERAPY | Age: 33
Discharge: HOME | End: 2018-05-30
Admitting: PODIATRIST

## 2018-06-01 ENCOUNTER — HOSPITAL ENCOUNTER (OUTPATIENT)
Dept: PHYSICAL THERAPY | Age: 33
Discharge: HOME | End: 2018-06-30
Attending: PODIATRIST | Admitting: PODIATRIST

## 2018-06-01 ENCOUNTER — HOSPITAL ENCOUNTER (OUTPATIENT)
Dept: PHYSICAL THERAPY | Age: 33
Discharge: HOME | End: 2018-06-02
Admitting: PODIATRIST

## 2018-06-01 NOTE — FLOWSHEET NOTE
Anna , [facility]    Physical Therapy Daily Treatment Note  Date:  2018    Patient Name:  Domitila Brown    :  1985  MRN: [id number]  Restrictions/Precautions:    Medical/Treatment Diagnosis Information:  · Diagnosis: trimalleolar fracture of R ankle, closed S82.851A DOS: 3/2/18  · Treatment Diagnosis: R ankle pain  Insurance/Certification information:  PT Insurance Information: [place] Medicaid, no copay, 30 OP visits  Physician Information:  Referring Practitioner: Ulises Cintron DPM  Plan of care signed (Y/N):     Date of Patient follow up with Physician:     G-Code (if applicable):      Date G-Code Applied:         Progress Note: [x]  Yes  []  No  Next due by: Visit #10       Latex Allergy:  [x]NO      []YES  Preferred Language for Healthcare:   [x]English       []other:    Visit # Insurance Allowable   11 30     Pain level:  310     SUBJECTIVE:  Occasional popping in the arch, otherwise a lot of stiffness over the achilles tendon. OBJECTIVE: 9+ weeks post op  Observation:decreasing antalgic gait; moderated effusion of R ankle  Test measurements:     2018:    -2 deg DF    54 deg PF    18 deg INV    6 deg EV  RESTRICTIONS/PRECAUTIONS: WBAT in aircast    Exercises/Interventions:     Therapeutic Ex 35' Resistance Sets/sec Reps Notes   Bike  6'              Bridging  210   Theraband DF/PF/IV/EV green 2 10 each       BAPS  2 10 DF/PF  IN/EV   Seated DF/PF  3 10    Seated/standing incline stretch  30\" 4    Fwd/Lat step ups 4\" 1 20    Heel tap 2\" 1 20           Manual Intervention 20'        ankle PROM 8 min      Tib/Fem Mobs       Patella Mobs       Ankle mobs 5 min   [place]indfoot, gr II   IA STM 7 min Plantar fascia, achilles       NMR re-education 10'       Bots[place]an/Biofeedback 10/10       G.  Med activaiton/sidelying       Mini squats  1 16    Side stepping/retro stepping  3 laps  @  wall                                          Therapeutic Exercise and NMR EXR  [x] (26120) Provided verbal/tactile cueing for activities related to strengthening, flexibility, endurance, ROM for improvements in LE, proximal hip, and core control with self care, mobility, lifting, ambulation.  [] (62969) Provided verbal/tactile cueing for activities related to improving balance, coordination, kinesthetic sense, posture, motor skill, proprioception  to assist with LE, proximal hip, and core control in self care, mobility, lifting, ambulation and eccentric single leg control.      NMR and Therapeutic Activities:    [] (73987 or 54640) Provided verbal/tactile cueing for activities related to improving balance, coordination, kinesthetic sense, posture, motor skill, proprioception and motor activation to allow for proper function of core, proximal hip and LE with self care and ADLs  [] (25656) Gait Re-education- Provided training and instruction to the patient for proper LE, core and proximal hip recruitment and positioning and eccentric body weight control with ambulation re-education including up and down stairs     Home Exercise Program:    [x] (49083) Reviewed/Progressed HEP activities related to strengthening, flexibility, endurance, ROM of core, proximal hip and LE for functional self-care, mobility, lifting and ambulation/stair navigation   [] (96449)Reviewed/Progressed HEP activities related to improving balance, coordination, kinesthetic sense, posture, motor skill, proprioception of core, proximal hip and LE for self care, mobility, lifting, and ambulation/stair navigation      Manual Treatments:  PROM / STM / Oscillations-Mobs:  G-I, II, III, IV (PA's, Inf., Post.)  [x] (14752) Provided manual therapy to mobilize LE, proximal hip and/or LS spine soft tissue/joints for the purpose of modulating pain, promoting relaxation,  increasing ROM, reducing/eliminating soft tissue swelling/inflammation/restriction, improving soft tissue extensibility and allowing for proper ROM for normal function with self care, mobility, lifting and ambulation. Modalities: Ice with stim x 15 min    Charges:  Timed Code Treatment Minutes: 55   Total Treatment Minutes: 70     [] EVAL  [x] TS(27151) x  2   [] IONTO  [x] NMR (78415) x  1   [] VASO  [x] Manual (25223) x  1    [] Other:  [] TA x       [] Mech Traction (32682)  [] ES(attended) (97413)      [x] ES (un) (77301):     GOALS:   Patient stated goal: walk without pain     Therapist goals for Patient:   Short Term Goals: To be achieved in: 2 weeks  1. Independent in HEP and progression per patient tolerance, in order to prevent re-injury. 2. Patient will have a decrease in pain to facilitate improvement in movement, function, and ADLs as indicated by Functional Deficits.     Long Term Goals: To be achieved in: 6 weeks  1. Disability index score of 25% or less for the LEFS to assist with reaching prior level of function. 2. Patient will demonstrate increased AROM to [facility] to allow for proper joint functioning as indicated by patients Functional Deficits. 3. Patient will demonstrate an increase in Strength to good proximal hip strength and control, within 5lb HHD in LE to allow for proper functional mobility as indicated by patients Functional Deficits. 4. Patient will return to walking without AD x 30 min without increased symptoms or restriction. 5. Pt will tolerate standing x 30 min for ADLS     Progression Towards Functional goals:  [x] Patient is progressing as expected towards functional goals listed. [] Progression is slowed due to complexities listed. [] Progression has been slowed due to co-morbidities. [] Plan just implemented, too soon to assess goals progression  [] Other:     ASSESSMENT:   Adhesions noted in the achilles tendon along with plantar fascia today.   Able to progress with functional step ups/heel taps with good tolerance    Treatment/Activity Tolerance:  [x] Patient tolerated treatment well [] Patient limited

## 2018-06-05 ENCOUNTER — HOSPITAL ENCOUNTER (OUTPATIENT)
Dept: PHYSICAL THERAPY | Age: 33
Discharge: HOME | End: 2018-06-06
Admitting: PODIATRIST

## 2018-06-07 ENCOUNTER — HOSPITAL ENCOUNTER (OUTPATIENT)
Dept: PHYSICAL THERAPY | Age: 33
Discharge: HOME | End: 2018-06-08
Admitting: PODIATRIST

## 2018-06-11 ENCOUNTER — HOSPITAL ENCOUNTER (OUTPATIENT)
Dept: PHYSICAL THERAPY | Age: 33
Discharge: HOME | End: 2018-06-12
Admitting: PODIATRIST

## 2018-06-15 ENCOUNTER — OFFICE VISIT (OUTPATIENT)
Dept: FAMILY MEDICINE CLINIC | Age: 33
End: 2018-06-15

## 2018-06-15 DIAGNOSIS — B96.89 ACUTE BACTERIAL SINUSITIS: Primary | ICD-10-CM

## 2018-06-15 DIAGNOSIS — J01.90 ACUTE BACTERIAL SINUSITIS: Primary | ICD-10-CM

## 2018-06-15 PROCEDURE — 99213 OFFICE O/P EST LOW 20 MIN: CPT | Performed by: FAMILY MEDICINE

## 2018-06-15 PROCEDURE — G8417 CALC BMI ABV UP PARAM F/U: HCPCS | Performed by: FAMILY MEDICINE

## 2018-06-15 PROCEDURE — 1036F TOBACCO NON-USER: CPT | Performed by: FAMILY MEDICINE

## 2018-06-15 PROCEDURE — G8427 DOCREV CUR MEDS BY ELIG CLIN: HCPCS | Performed by: FAMILY MEDICINE

## 2018-06-15 RX ORDER — SULFAMETHOXAZOLE AND TRIMETHOPRIM 800; 160 MG/1; MG/1
1 TABLET ORAL 2 TIMES DAILY
Qty: 20 TABLET | Refills: 0 | Status: SHIPPED | OUTPATIENT
Start: 2018-06-15 | End: 2018-06-25

## 2018-06-17 VITALS
SYSTOLIC BLOOD PRESSURE: 132 MMHG | WEIGHT: 193.2 LBS | TEMPERATURE: 98.5 F | BODY MASS INDEX: 32.15 KG/M2 | DIASTOLIC BLOOD PRESSURE: 80 MMHG

## 2018-06-17 ASSESSMENT — ENCOUNTER SYMPTOMS
ALLERGIC/IMMUNOLOGIC NEGATIVE: 1
GASTROINTESTINAL NEGATIVE: 1
EYES NEGATIVE: 1

## 2018-06-18 ENCOUNTER — HOSPITAL ENCOUNTER (OUTPATIENT)
Dept: PHYSICAL THERAPY | Age: 33
Discharge: HOME | End: 2018-06-19
Admitting: PODIATRIST

## 2018-06-21 ENCOUNTER — OFFICE VISIT (OUTPATIENT)
Dept: ORTHOPEDIC SURGERY | Age: 33
End: 2018-06-21

## 2018-06-21 ENCOUNTER — HOSPITAL ENCOUNTER (OUTPATIENT)
Dept: PHYSICAL THERAPY | Age: 33
Discharge: HOME | End: 2018-06-22
Admitting: PODIATRIST

## 2018-06-21 VITALS
WEIGHT: 193.12 LBS | HEIGHT: 65 IN | SYSTOLIC BLOOD PRESSURE: 138 MMHG | HEART RATE: 92 BPM | BODY MASS INDEX: 32.18 KG/M2 | DIASTOLIC BLOOD PRESSURE: 89 MMHG

## 2018-06-21 DIAGNOSIS — S82.851A TRIMALLEOLAR FRACTURE OF ANKLE, CLOSED, RIGHT, INITIAL ENCOUNTER: Primary | ICD-10-CM

## 2018-06-21 PROCEDURE — G8417 CALC BMI ABV UP PARAM F/U: HCPCS | Performed by: PODIATRIST

## 2018-06-21 PROCEDURE — 99213 OFFICE O/P EST LOW 20 MIN: CPT | Performed by: PODIATRIST

## 2018-06-21 PROCEDURE — 1036F TOBACCO NON-USER: CPT | Performed by: PODIATRIST

## 2018-06-21 PROCEDURE — G8427 DOCREV CUR MEDS BY ELIG CLIN: HCPCS | Performed by: PODIATRIST

## 2018-06-26 ENCOUNTER — HOSPITAL ENCOUNTER (OUTPATIENT)
Dept: PHYSICAL THERAPY | Age: 33
Discharge: HOME | End: 2018-06-27
Admitting: PODIATRIST

## 2018-06-29 ENCOUNTER — HOSPITAL ENCOUNTER (OUTPATIENT)
Dept: PHYSICAL THERAPY | Age: 33
Discharge: HOME | End: 2018-07-31
Admitting: PODIATRIST

## 2018-06-29 NOTE — FLOWSHEET NOTE
Anna , [facility]    Physical Therapy Daily Treatment Note  Date:  2018    Patient Name:  Rebecca Gutierrez    :  1985  MRN: [id number]  Restrictions/Precautions:    Medical/Treatment Diagnosis Information:  · Diagnosis: trimalleolar fracture of R ankle, closed S82.851A DOS: 3/2/18  · Treatment Diagnosis: R ankle pain  Insurance/Certification information:  PT Insurance Information: [place] Medicaid, no copay, 30 OP visits  Physician Information:  Referring Practitioner: Divya Rosenbaum DPM  Plan of care signed (Y/N):     Date of Patient follow up with Physician:     G-Code (if applicable):      Date G-Code Applied:         Progress Note: [x]  Yes  []  No  Next due by: Visit #10       Latex Allergy:  [x]NO      []YES  Preferred Language for Healthcare:   [x]English       []other:    Visit # Insurance Allowable   18 30     Pain level:  3/10     SUBJECTIVE:  Pt reports having some aching today. Went swimming yesterday for 2 hours in the pool and may have overdone it. Soreness along posterior medial aspect of the ankle. Notes that sometimes when she places her heal down she feels a quick sharp pain along the inside of the heel and foot.      OBJECTIVE: 9+ weeks post op  Observation:decreasing antalgic gait; moderated effusion of R ankle  Test measurements:     2018:    -2 deg DF    54 deg PF    18 deg INV    6 deg EV  RESTRICTIONS/PRECAUTIONS: WBAT in aircast    Exercises/Interventions:     Therapeutic Ex 30' Resistance Sets/sec Reps Notes   Bike  6'     Bridging  210   Theraband DF/PF/IV/EV green 2 10 each   Nerve glides  1 30    BAPS  2 10 DF/PF  IN/EV   Seated DF/PF  3 10    Seated/standing incline stretch  30\" 4    Fwd/Lat step ups 4\" 1 20    Heel tap 2\" 1 20    Heel raises  3 10           Manual Intervention 15'        ankle PROM 3 min      Tib/Fem Mobs       Patella Mobs       Ankle mobs 8 min   Functional foot and ankle mob series   IA STM 5min

## 2018-07-01 ENCOUNTER — HOSPITAL ENCOUNTER (OUTPATIENT)
Dept: PHYSICAL THERAPY | Age: 33
Discharge: HOME | End: 2018-07-01
Attending: PODIATRIST | Admitting: PODIATRIST

## 2018-07-02 ENCOUNTER — HOSPITAL ENCOUNTER (OUTPATIENT)
Dept: PHYSICAL THERAPY | Age: 33
Discharge: HOME | End: 2018-07-03
Admitting: PODIATRIST

## 2018-07-02 NOTE — FLOWSHEET NOTE
Anna [address], [facility]    Physical Therapy Daily Treatment Note  Date:  2018    Patient Name:  Geovanny Rubin    :  1985  MRN: [id number]  Restrictions/Precautions:    Medical/Treatment Diagnosis Information:  · Diagnosis: trimalleolar fracture of R ankle, closed S82.851A DOS: 3/2/18  · Treatment Diagnosis: R ankle pain  Insurance/Certification information:  PT Insurance Information: [place] Medicaid, no copay, 30 OP visits  Physician Information:  Referring Practitioner: Jermaine Helton DPM  Plan of care signed (Y/N):     Date of Patient follow up with Physician:     G-Code (if applicable):      Date G-Code Applied:         Progress Note: [x]  Yes  []  No  Next due by: Visit #10       Latex Allergy:  [x]NO      []YES  Preferred Language for Healthcare:   [x]English       []other:    Visit # Insurance Allowable    30     Pain level:  2/10     SUBJECTIVE:  Pt reports having some soreness but it isn't too bad. Has been out of air cast brace and into compression brace for two days now. OBJECTIVE: 9+ weeks post op  Observation:decreasing antalgic gait; moderated effusion of R ankle  Test measurements:     2018:    -2 deg DF    54 deg PF    18 deg INV    6 deg EV  RESTRICTIONS/PRECAUTIONS: WBAT in aircast    Exercises/Interventions:     Therapeutic Ex 30' Resistance Sets/sec Reps Notes   Bike  6'     Bridging  210   Theraband DF/PF/IV/EV green 2 10 each   Nerve glides  1 30    BAPS  2 10 DF/PF  IN/EV   Seated DF/PF  3 10    Seated/standing incline stretch  30\" 4    Fwd/Lat step ups 4\" 1 20    Heel tap 2\" 1 20    Heel raises  3 10           Manual Intervention 15'        ankle PROM 3 min      Tib/Fem Mobs       Patella Mobs       Ankle mobs 5 min   Functional foot and ankle mob series   IA STM 8 min Plantar fascia, achilles, lateral ankle       NMR re-education 10'       [place]ian/Biofeedback 10/10       G.  Med activaiton/sidelying       Mini squats  1 16 Side stepping/retro stepping  3 laps  @ 1/2 wall   Single leg balance  30\" 3 With index fingers on wall   Tandem balance  30\" 2                             Therapeutic Exercise and NMR EXR  [x] (23639) Provided verbal/tactile cueing for activities related to strengthening, flexibility, endurance, ROM for improvements in LE, proximal hip, and core control with self care, mobility, lifting, ambulation.  [] (56109) Provided verbal/tactile cueing for activities related to improving balance, coordination, kinesthetic sense, posture, motor skill, proprioception  to assist with LE, proximal hip, and core control in self care, mobility, lifting, ambulation and eccentric single leg control.      NMR and Therapeutic Activities:    [] (04627 or 97669) Provided verbal/tactile cueing for activities related to improving balance, coordination, kinesthetic sense, posture, motor skill, proprioception and motor activation to allow for proper function of core, proximal hip and LE with self care and ADLs  [] (06340) Gait Re-education- Provided training and instruction to the patient for proper LE, core and proximal hip recruitment and positioning and eccentric body weight control with ambulation re-education including up and down stairs     Home Exercise Program:    [x] (44756) Reviewed/Progressed HEP activities related to strengthening, flexibility, endurance, ROM of core, proximal hip and LE for functional self-care, mobility, lifting and ambulation/stair navigation   [] (80317)Reviewed/Progressed HEP activities related to improving balance, coordination, kinesthetic sense, posture, motor skill, proprioception of core, proximal hip and LE for self care, mobility, lifting, and ambulation/stair navigation      Manual Treatments:  PROM / STM / Oscillations-Mobs:  G-I, II, III, IV (PA's, Inf., Post.)  [x] (67943) Provided manual therapy to mobilize LE, proximal hip and/or LS spine soft tissue/joints for the purpose of modulating pain, promoting relaxation,  increasing ROM, reducing/eliminating soft tissue swelling/inflammation/restriction, improving soft tissue extensibility and allowing for proper ROM for normal function with self care, mobility, lifting and ambulation. Modalities:  VASO with TENS x 15 min    Charges:  Timed Code Treatment Minutes: 55   Total Treatment Minutes: 70     [] EVAL  [x] XN(79738) x  2   [] IONTO  [x] NMR (82017) x  1   [] VASO  [x] Manual (85167) x  1    [] Other:  [] TA x       [] Mech Traction (90130)  [] ES(attended) (37386)      [x] ES (un) (84006):     GOALS:   Patient stated goal: walk without pain     Therapist goals for Patient:   Short Term Goals: To be achieved in: 2 weeks  1. Independent in HEP and progression per patient tolerance, in order to prevent re-injury. 2. Patient will have a decrease in pain to facilitate improvement in movement, function, and ADLs as indicated by Functional Deficits.     Long Term Goals: To be achieved in: 6 weeks  1. Disability index score of 25% or less for the LEFS to assist with reaching prior level of function. 2. Patient will demonstrate increased AROM to [facility] to allow for proper joint functioning as indicated by patients Functional Deficits. 3. Patient will demonstrate an increase in Strength to good proximal hip strength and control, within 5lb HHD in LE to allow for proper functional mobility as indicated by patients Functional Deficits. 4. Patient will return to walking without AD x 30 min without increased symptoms or restriction. 5. Pt will tolerate standing x 30 min for ADLS     Progression Towards Functional goals:  [x] Patient is progressing as expected towards functional goals listed. [] Progression is slowed due to complexities listed. [] Progression has been slowed due to co-morbidities. [] Plan just implemented, too soon to assess goals progression  [] Other:     ASSESSMENT:   Plantar fascia remains very tight and irritable.  Small

## 2018-07-05 ENCOUNTER — HOSPITAL ENCOUNTER (OUTPATIENT)
Dept: PHYSICAL THERAPY | Age: 33
Discharge: HOME | End: 2018-07-06
Admitting: PODIATRIST

## 2018-07-05 NOTE — FLOWSHEET NOTE
[facility] , [facility]    Physical Therapy Daily Treatment Note  Date:  2018    Patient Name:  Mansoor Kurtz    :  1985  MRN: [id number]  Restrictions/Precautions:    Medical/Treatment Diagnosis Information:  · Diagnosis: trimalleolar fracture of R ankle, closed S82.851A DOS: 3/2/18  · Treatment Diagnosis: R ankle pain  Insurance/Certification information:  PT Insurance Information: [place] Medicaid, no copay, 30 OP visits  Physician Information:  Referring Practitioner: Truong Osorio DPM  Plan of care signed (Y/N):     Date of Patient follow up with Physician:     G-Code (if applicable):      Date G-Code Applied:         Progress Note: [x]  Yes  []  No  Next due by: Visit #10       Latex Allergy:  [x]NO      []YES  Preferred Language for Healthcare:   [x]English       []other:    Visit # Insurance Allowable   20 30     Pain level:  4/10     SUBJECTIVE:  Pt reports pain along medial malleolus and medial lower leg. Felt a \"pop\" in posterior ankle and pushing off during walking has been hurting since. OBJECTIVE: 9+ weeks post op  Observation:decreasing antalgic gait; moderated effusion of R ankle  Test measurements:     2018:    -2 deg DF    54 deg PF    18 deg INV    6 deg EV  RESTRICTIONS/PRECAUTIONS: WBAT in aircast    Exercises/Interventions:     Therapeutic Ex 30' Resistance Sets/sec Reps Notes   Bike  6'     Bridging  210   Theraband DF/PF/IV/EV blue 2 10 each   Nerve glides  1 30    BAPS  2 10 DF/PF  IN/EV   Seated DF/PF  3 10    Seated/standing incline stretch  30\" 4    Fwd/Lat step ups 4\" 1 20    Heel tap 2\" 1 20    Heel raises  3 10    Sliding lunges  1 15           Manual Intervention 15'        ankle PROM 3 min      Tib/Fem Mobs       Patella Mobs       Ankle mobs 5 min   Functional foot and ankle mob series   IA STM 8 min Plantar fascia, achilles, lateral ankle       NMR re-education 10'       Armenian/Biofeedback 10/10       G.  Med activaiton/sidelying       Mini squats  1 16    Side stepping/retro stepping  3 laps  @ 1/2 wall   Single leg balance  30\" 3 With index fingers on wall   Tandem balance  30\" 2                             Therapeutic Exercise and NMR EXR  [x] (17529) Provided verbal/tactile cueing for activities related to strengthening, flexibility, endurance, ROM for improvements in LE, proximal hip, and core control with self care, mobility, lifting, ambulation.  [] (38720) Provided verbal/tactile cueing for activities related to improving balance, coordination, kinesthetic sense, posture, motor skill, proprioception  to assist with LE, proximal hip, and core control in self care, mobility, lifting, ambulation and eccentric single leg control.      NMR and Therapeutic Activities:    [] (66045 or 06228) Provided verbal/tactile cueing for activities related to improving balance, coordination, kinesthetic sense, posture, motor skill, proprioception and motor activation to allow for proper function of core, proximal hip and LE with self care and ADLs  [] (44413) Gait Re-education- Provided training and instruction to the patient for proper LE, core and proximal hip recruitment and positioning and eccentric body weight control with ambulation re-education including up and down stairs     Home Exercise Program:    [x] (56588) Reviewed/Progressed HEP activities related to strengthening, flexibility, endurance, ROM of core, proximal hip and LE for functional self-care, mobility, lifting and ambulation/stair navigation   [] (16789)Reviewed/Progressed HEP activities related to improving balance, coordination, kinesthetic sense, posture, motor skill, proprioception of core, proximal hip and LE for self care, mobility, lifting, and ambulation/stair navigation      Manual Treatments:  PROM / STM / Oscillations-Mobs:  G-I, II, III, IV (PA's, Inf., Post.)  [x] (22427) Provided manual therapy to mobilize LE, proximal hip and/or LS spine soft tissue/joints for the purpose of modulating pain, promoting relaxation,  increasing ROM, reducing/eliminating soft tissue swelling/inflammation/restriction, improving soft tissue extensibility and allowing for proper ROM for normal function with self care, mobility, lifting and ambulation. Modalities:  VASO with TENS x 15 min    Charges:  Timed Code Treatment Minutes: 55   Total Treatment Minutes: 70     [] EVAL  [x] IS(26324) x  2   [] IONTO  [x] NMR (03021) x  1   [] VASO  [x] Manual (01795) x  1    [] Other:  [] TA x       [] Mech Traction (28461)  [] ES(attended) (00662)      [x] ES (un) (99243):     GOALS:   Patient stated goal: walk without pain     Therapist goals for Patient:   Short Term Goals: To be achieved in: 2 weeks  1. Independent in HEP and progression per patient tolerance, in order to prevent re-injury. 2. Patient will have a decrease in pain to facilitate improvement in movement, function, and ADLs as indicated by Functional Deficits.     Long Term Goals: To be achieved in: 6 weeks  1. Disability index score of 25% or less for the LEFS to assist with reaching prior level of function. 2. Patient will demonstrate increased AROM to [facility] to allow for proper joint functioning as indicated by patients Functional Deficits. 3. Patient will demonstrate an increase in Strength to good proximal hip strength and control, within 5lb HHD in LE to allow for proper functional mobility as indicated by patients Functional Deficits. 4. Patient will return to walking without AD x 30 min without increased symptoms or restriction. 5. Pt will tolerate standing x 30 min for ADLS     Progression Towards Functional goals:  [x] Patient is progressing as expected towards functional goals listed. [] Progression is slowed due to complexities listed. [] Progression has been slowed due to co-morbidities.   [] Plan just implemented, too soon to assess goals progression  [] Other:     ASSESSMENT:  Small

## 2018-07-09 ENCOUNTER — HOSPITAL ENCOUNTER (OUTPATIENT)
Dept: PHYSICAL THERAPY | Age: 33
Discharge: HOME | End: 2018-07-10
Admitting: PODIATRIST

## 2018-07-12 ENCOUNTER — HOSPITAL ENCOUNTER (OUTPATIENT)
Dept: PHYSICAL THERAPY | Age: 33
Discharge: HOME | End: 2018-07-13
Admitting: PODIATRIST

## 2018-07-12 NOTE — FLOWSHEET NOTE
Anna , [facility]    Physical Therapy Daily Treatment Note  Date:  2018    Patient Name:  Rebecca Gutierrez    :  1985  MRN: [id number]  Restrictions/Precautions:    Medical/Treatment Diagnosis Information:  · Diagnosis: trimalleolar fracture of R ankle, closed S82.851A DOS: 3/2/18  · Treatment Diagnosis: R ankle pain  Insurance/Certification information:  PT Insurance Information: [place] Medicaid, no copay, 30 OP visits  Physician Information:  Referring Practitioner: Divya Rosenbaum DPM  Plan of care signed (Y/N):     Date of Patient follow up with Physician:     G-Code (if applicable):      Date G-Code Applied:         Progress Note: [x]  Yes  []  No  Next due by: Visit #10       Latex Allergy:  [x]NO      []YES  Preferred Language for Healthcare:   [x]English       []other:    Visit # Insurance Allowable   22 30     Pain level: 2-3/10      SUBJECTIVE:  Pt continues to c/o stiffness in the ankle and swelling over the lateral ankle. Has not had time yet to make appointment with Dr. Jeremiah Hilliard.    OBJECTIVE: 9+ weeks post op  Observation:decreasing antalgic gait; moderated effusion of R ankle  Test measurements:     2018:    -2 deg DF    54 deg PF    18 deg INV    6 deg EV  RESTRICTIONS/PRECAUTIONS: WBAT in aircast    Exercises/Interventions:     Therapeutic Ex 30' Resistance Sets/sec Reps Notes   Bike  6'     Bridging  210   Theraband DF/PF/IV/EV blue 2 10 each   Nerve glides  1 30    BAPS  2 10 DF/PF  IN/EV   Seated DF/PF  3 10    Seated/standing incline stretch  30\" 4    Fwd/Lat step ups 6\" 1 20    Heel tap 2\" 1 20    Heel raises  3 10    Sliding lunges  1 20           Manual Intervention 15'        ankle PROM 3 min      Tib/Fem Mobs       Patella Mobs       Ankle mobs 4 min   Lateral tibial glides, anterior fibular mobs   IA STM 8 min Plantar fascia, achilles, lateral ankle       NMR re-education        Guinean/Biofeedback 10/10       G.  Med activaiton/sidelying       Mini squats  1 16    Side stepping/retro stepping    @ 1/2 wall   Single leg balance  30\" 3 With index fingers on wall   Tandem balance  30\" 2                             Therapeutic Exercise and NMR EXR  [x] (10501) Provided verbal/tactile cueing for activities related to strengthening, flexibility, endurance, ROM for improvements in LE, proximal hip, and core control with self care, mobility, lifting, ambulation.  [] (44237) Provided verbal/tactile cueing for activities related to improving balance, coordination, kinesthetic sense, posture, motor skill, proprioception  to assist with LE, proximal hip, and core control in self care, mobility, lifting, ambulation and eccentric single leg control.      NMR and Therapeutic Activities:    [] (77924 or 52627) Provided verbal/tactile cueing for activities related to improving balance, coordination, kinesthetic sense, posture, motor skill, proprioception and motor activation to allow for proper function of core, proximal hip and LE with self care and ADLs  [] (69661) Gait Re-education- Provided training and instruction to the patient for proper LE, core and proximal hip recruitment and positioning and eccentric body weight control with ambulation re-education including up and down stairs     Home Exercise Program:    [x] (58500) Reviewed/Progressed HEP activities related to strengthening, flexibility, endurance, ROM of core, proximal hip and LE for functional self-care, mobility, lifting and ambulation/stair navigation   [] (35660)Reviewed/Progressed HEP activities related to improving balance, coordination, kinesthetic sense, posture, motor skill, proprioception of core, proximal hip and LE for self care, mobility, lifting, and ambulation/stair navigation      Manual Treatments:  PROM / STM / Oscillations-Mobs:  G-I, II, III, IV (PA's, Inf., Post.)  [x] (74522) Provided manual therapy to mobilize LE, proximal hip and/or LS spine soft

## 2018-07-16 ENCOUNTER — HOSPITAL ENCOUNTER (OUTPATIENT)
Dept: PHYSICAL THERAPY | Age: 33
Discharge: HOME | End: 2018-07-17
Admitting: PODIATRIST

## 2018-07-16 NOTE — FLOWSHEET NOTE
Anna 38, [facility]    Physical Therapy Daily Treatment Note  Date:  2018    Patient Name:  Chyna Zamorano    :  1985  MRN: [id number]  Restrictions/Precautions:    Medical/Treatment Diagnosis Information:  · Diagnosis: trimalleolar fracture of R ankle, closed S82.851A DOS: 3/2/18  · Treatment Diagnosis: R ankle pain  Insurance/Certification information:  PT Insurance Information: [place] Medicaid, no copay, 30 OP visits  Physician Information:  Referring Practitioner: Padmini Tate DPM  Plan of care signed (Y/N):     Date of Patient follow up with Physician:     G-Code (if applicable):      Date G-Code Applied:         Progress Note: [x]  Yes  []  No  Next due by: Visit #10       Latex Allergy:  [x]NO      []YES  Preferred Language for Healthcare:   [x]English       []other:    Visit # Insurance Allowable   23 30     Pain level: 3/10      SUBJECTIVE:  Pt continues to c/o stiffness in the ankle and swelling over the lateral ankle. Feels like it is getting better overall but still gets a quick sharp pain occasionally.     OBJECTIVE: 9+ weeks post op  Observation:decreasing antalgic gait; moderated effusion of R ankle  Test measurements:     2018:    -2 deg DF    54 deg PF    18 deg INV    6 deg EV  RESTRICTIONS/PRECAUTIONS: WBAT in aircast    Exercises/Interventions:     Therapeutic Ex 30' Resistance Sets/sec Reps Notes   Bike  6'     Bridging  210   Theraband DF/PF/IV/EV blue 2 10 each   Nerve glides  1 30    BAPS  2 10 DF/PF  IN/EV   Seated DF/PF  3 10    Seated/standing incline stretch  30\" 4    Fwd/Lat step ups 6\" 1 20    Heel tap 2\" 1 20    Heel raises  3 10    Sliding lunges  1 20 HELD          Manual Intervention 15'        ankle PROM 3 min      Tib/Fem Mobs       Patella Mobs       Ankle mobs 4 min   Lateral tibial glides, anterior fibular mobs   IA STM 8 min Plantar fascia, achilles, lateral ankle       NMR re-education Bahraini/Biofeedback 10/10       G. Med activaiton/sidelying       Mini squats  1 16    Side stepping/retro stepping    @ 1/2 wall   Single leg balance  30\" 3 With index fingers on wall   Tandem balance  30\" 2                             Therapeutic Exercise and NMR EXR  [x] (64425) Provided verbal/tactile cueing for activities related to strengthening, flexibility, endurance, ROM for improvements in LE, proximal hip, and core control with self care, mobility, lifting, ambulation.  [] (34543) Provided verbal/tactile cueing for activities related to improving balance, coordination, kinesthetic sense, posture, motor skill, proprioception  to assist with LE, proximal hip, and core control in self care, mobility, lifting, ambulation and eccentric single leg control.      NMR and Therapeutic Activities:    [] (72163 or 47013) Provided verbal/tactile cueing for activities related to improving balance, coordination, kinesthetic sense, posture, motor skill, proprioception and motor activation to allow for proper function of core, proximal hip and LE with self care and ADLs  [] (18447) Gait Re-education- Provided training and instruction to the patient for proper LE, core and proximal hip recruitment and positioning and eccentric body weight control with ambulation re-education including up and down stairs     Home Exercise Program:    [x] (07061) Reviewed/Progressed HEP activities related to strengthening, flexibility, endurance, ROM of core, proximal hip and LE for functional self-care, mobility, lifting and ambulation/stair navigation   [] (52580)Reviewed/Progressed HEP activities related to improving balance, coordination, kinesthetic sense, posture, motor skill, proprioception of core, proximal hip and LE for self care, mobility, lifting, and ambulation/stair navigation      Manual Treatments:  PROM / STM / Oscillations-Mobs:  G-I, II, III, IV (PA's, Inf., Post.)  [x] (73391) Provided manual therapy to mobilize LE, proximal hip and/or LS spine soft tissue/joints for the purpose of modulating pain, promoting relaxation,  increasing ROM, reducing/eliminating soft tissue swelling/inflammation/restriction, improving soft tissue extensibility and allowing for proper ROM for normal function with self care, mobility, lifting and ambulation. Modalities:  VASO with TENS x 15 min     Charges:  Timed Code Treatment Minutes: 60   Total Treatment Minutes: 75     [] EVAL  [x] BA(73237) x  3   [] IONTO  [] NMR (13302) x      [] VASO  [x] Manual (57420) x  1    [] Other:  [] TA x       [] Mech Traction (83940)  [] ES(attended) (22769)      [x] ES (un) (68595):     GOALS:   Patient stated goal: walk without pain     Therapist goals for Patient:   Short Term Goals: To be achieved in: 2 weeks  1. Independent in HEP and progression per patient tolerance, in order to prevent re-injury. 2. Patient will have a decrease in pain to facilitate improvement in movement, function, and ADLs as indicated by Functional Deficits.     Long Term Goals: To be achieved in: 6 weeks  1. Disability index score of 25% or less for the LEFS to assist with reaching prior level of function. 2. Patient will demonstrate increased AROM to [facility] to allow for proper joint functioning as indicated by patients Functional Deficits. 3. Patient will demonstrate an increase in Strength to good proximal hip strength and control, within 5lb HHD in LE to allow for proper functional mobility as indicated by patients Functional Deficits. 4. Patient will return to walking without AD x 30 min without increased symptoms or restriction. 5. Pt will tolerate standing x 30 min for ADLS     Progression Towards Functional goals:  [x] Patient is progressing as expected towards functional goals listed. [] Progression is slowed due to complexities listed. [] Progression has been slowed due to co-morbidities.   [] Plan just implemented, too soon to assess goals progression  [] Other: ASSESSMENT: Continues to have small improvements in ROM. Continues to have pain over medial aspect of the ankle. Tolerated exercises well. Treatment/Activity Tolerance:  [x] Patient tolerated treatment well [] Patient limited by fatique  [x] Patient limited by pain  [] Patient limited by other medical complications  [] Other:     Prognosis: [x] Good [] Fair  [] Poor    Patient Requires Follow-up: [x] Yes  [] No    PLAN:   [x] Continue per plan of care [] Alter current plan (see comments)  [] Plan of care initiated [] Hold pending MD visit [] Discharge    Therapist was present, directed the patient's care, made skilled judgement, and was responsible for assessment and treatment of the patient.   Casey RENDON    Electronically signed by: Grecia RENDON

## 2018-07-19 ENCOUNTER — OFFICE VISIT (OUTPATIENT)
Dept: ORTHOPEDIC SURGERY | Age: 33
End: 2018-07-19

## 2018-07-19 VITALS
WEIGHT: 190 LBS | HEIGHT: 65 IN | BODY MASS INDEX: 31.65 KG/M2 | SYSTOLIC BLOOD PRESSURE: 120 MMHG | DIASTOLIC BLOOD PRESSURE: 74 MMHG | HEART RATE: 80 BPM

## 2018-07-19 DIAGNOSIS — S82.851A TRIMALLEOLAR FRACTURE OF ANKLE, CLOSED, RIGHT, INITIAL ENCOUNTER: Primary | ICD-10-CM

## 2018-07-19 PROCEDURE — G8427 DOCREV CUR MEDS BY ELIG CLIN: HCPCS | Performed by: PODIATRIST

## 2018-07-19 PROCEDURE — 99213 OFFICE O/P EST LOW 20 MIN: CPT | Performed by: PODIATRIST

## 2018-07-19 PROCEDURE — G8417 CALC BMI ABV UP PARAM F/U: HCPCS | Performed by: PODIATRIST

## 2018-07-19 PROCEDURE — 1036F TOBACCO NON-USER: CPT | Performed by: PODIATRIST

## 2018-07-19 RX ORDER — HYDROXYZINE HYDROCHLORIDE 25 MG/1
TABLET, FILM COATED ORAL
Refills: 0 | COMMUNITY
Start: 2018-06-23 | End: 2018-11-13

## 2018-07-19 NOTE — PROGRESS NOTES
Approximate five-month postop check. She was asked by her physical therapist to see me again for pain she is having inside part of the ankle and also for continued decreased range of motion in dorsiflexion. They suspected that the screws at the medial malleolus may be irritating the tendon and also the syndesmotic screw may be limiting ankle joint dorsiflexion. She is able to dorsiflex to neutral.  This is a hard stop. She has mild palpable tenderness over the medial malleolus. There is minimal edema to the ankle. No signs of infection are present. She has palpable pedal pulses. Her sensation is grossly intact. Status post trimalleolar ankle fracture ORIF left is 5 months    I would think the more likely reason for the lack of dorsiflexion is that the posterior capsule has scarred from the posterior malleolar fracture. I think physical therapy is still needed to help release the contracture. Also the course of the tibialis posterior tendon is typically more posterior than the prominent screw heads. She'll give it more time with physical therapy, which she is fine with. I'll see her back in 3 months.

## 2018-07-24 ENCOUNTER — HOSPITAL ENCOUNTER (OUTPATIENT)
Dept: PHYSICAL THERAPY | Age: 33
Discharge: HOME | End: 2018-07-25
Admitting: PODIATRIST

## 2018-07-24 NOTE — FLOWSHEET NOTE
relaxation,  increasing ROM, reducing/eliminating soft tissue swelling/inflammation/restriction, improving soft tissue extensibility and allowing for proper ROM for normal function with self care, mobility, lifting and ambulation. Modalities:  VASO with TENS x 15 min     Charges:  Timed Code Treatment Minutes: 55   Total Treatment Minutes: 70     [] EVAL  [x] [place](50898) x  3   [] IONTO  [] NMR (58239) x      [] VASO  [x] Manual (38624) x  1    [] Other:  [] TA x       [] Mech Traction (84806)  [] ES(attended) (75712)      [x] ES (un) (56297):     GOALS:   Patient stated goal: walk without pain     Therapist goals for Patient:   Short Term Goals: To be achieved in: 2 weeks  1. Independent in HEP and progression per patient tolerance, in order to prevent re-injury. 2. Patient will have a decrease in pain to facilitate improvement in movement, function, and ADLs as indicated by Functional Deficits.     Long Term Goals: To be achieved in: 6 weeks  1. Disability index score of 25% or less for the LEFS to assist with reaching prior level of function. 2. Patient will demonstrate increased AROM to [facility] to allow for proper joint functioning as indicated by patients Functional Deficits. 3. Patient will demonstrate an increase in Strength to good proximal hip strength and control, within 5lb HHD in LE to allow for proper functional mobility as indicated by patients Functional Deficits. 4. Patient will return to walking without AD x 30 min without increased symptoms or restriction. 5. Pt will tolerate standing x 30 min for ADLS     Progression Towards Functional goals:  [x] Patient is progressing as expected towards functional goals listed. [] Progression is slowed due to complexities listed. [] Progression has been slowed due to co-morbidities. [] Plan just implemented, too soon to assess goals progression  [] Other:     ASSESSMENT: pt educated on DF mobilization to perform at home.  Closed chain DF improving slowly. Able to progress with leg press exercises with fatigue but without pain provocation. Treatment/Activity Tolerance:  [x] Patient tolerated treatment well [] Patient limited by fatique  [x] Patient limited by pain  [] Patient limited by other medical complications  [] Other:     Prognosis: [x] Good [] Fair  [] Poor    Patient Requires Follow-up: [x] Yes  [] No    PLAN:   [x] Continue per plan of care [] Alter current plan (see comments)  [] Plan of care initiated [] Hold pending MD visit [] Discharge        Electronically signed by:  Ken Jackson PT

## 2018-08-01 ENCOUNTER — HOSPITAL ENCOUNTER (OUTPATIENT)
Dept: PHYSICAL THERAPY | Age: 33
Discharge: HOME | End: 2018-08-31
Attending: PODIATRIST | Admitting: PODIATRIST

## 2018-08-07 ENCOUNTER — HOSPITAL ENCOUNTER (OUTPATIENT)
Dept: PHYSICAL THERAPY | Age: 33
Discharge: HOME | End: 2018-08-08
Admitting: PODIATRIST

## 2018-08-07 NOTE — FLOWSHEET NOTE
[address], [facility]    Physical Therapy Daily Treatment Note  Date:  2018    Patient Name:  Wil Downing    :  1985  MRN: [id number]  Restrictions/Precautions:    Medical/Treatment Diagnosis Information:  · Diagnosis: trimalleolar fracture of R ankle, closed S82.851A DOS: 3/2/18  · Treatment Diagnosis: R ankle pain  Insurance/Certification information:  PT Insurance Information: [place], no copay, 30 OP visits  Physician Information:  Referring Practitioner: Oswaldo Allen DPM  Plan of care signed (Y/N):     Date of Patient follow up with Physician:     G-Code (if applicable):      Date G-Code Applied:         Progress Note: [x]  Yes  []  No  Next due by: Visit #10       Latex Allergy:  [x]NO      []YES  Preferred Language for Healthcare:   [x]English       []other:    Visit # Insurance Allowable   25 30     Pain level:  1/10     SUBJECTIVE:  Medial ankle pain today and stiffness after walking increased distances in [place] last week. OBJECTIVE:   Observation:decreasing antalgic gait; moderated effusion of R ankle  Test measurements:     2018:    -2 deg DF    54 deg PF    18 deg INV    6 deg EV  18 Ankle DF -1 deg  18: inv 22 deg, ev 8 deg, DF 0    RESTRICTIONS/PRECAUTIONS:     Exercises/Interventions:     Therapeutic Ex  Resistance Sets/sec Reps Notes   Bike  6'     Bridging  210   Theraband DF/PF/IV/EV blue 2 10 each   Nerve glides  1 30    BAPS  2 10 DF/PF  IN/EV      standing incline stretch  30\" 4    Fwd/Lat step ups 6\" 1 20    Heel tap 4\" 1 20    Heel raises  3 10    Sliding lunges  1 20 Leg press heel raises 60# 1 30    Leg press, SL 60# 1 30           Manual Intervention         ankle PROM       Tib/Fem Mobs       Patella Mobs       Ankle mobs 4 min   Lateral tibial glides, anterior fibular mobs   IA STM 8 min Plantar fascia, achilles, lateral ankle       NMR re-education        Bahamian/Biofeedback 10/10       G.  Med activaiton/sidelying          Side stepping/ ORANGE 3 laps     Single leg balance  30\" 3 With index fingers on wall   Tandem balance  30\" 2                             Therapeutic Exercise and NMR EXR  [x] (10920) Provided verbal/tactile cueing for activities related to strengthening, flexibility, endurance, ROM for improvements in LE, proximal hip, and core control with self care, mobility, lifting, ambulation.  [] (39280) Provided verbal/tactile cueing for activities related to improving balance, coordination, kinesthetic sense, posture, motor skill, proprioception  to assist with LE, proximal hip, and core control in self care, mobility, lifting, ambulation and eccentric single leg control.      NMR and Therapeutic Activities:    [] (22951 or 30177) Provided verbal/tactile cueing for activities related to improving balance, coordination, kinesthetic sense, posture, motor skill, proprioception and motor activation to allow for proper function of core, proximal hip and LE with self care and ADLs  [] (54747) Gait Re-education- Provided training and instruction to the patient for proper LE, core and proximal hip recruitment and positioning and eccentric body weight control with ambulation re-education including up and down stairs     Home Exercise Program:    [x] (55792) Reviewed/Progressed HEP activities related to strengthening, flexibility, endurance, ROM of core, proximal hip and LE for functional self-care, mobility, lifting and ambulation/stair navigation   [] (31654)Reviewed/Progressed HEP activities related to improving balance, coordination, kinesthetic sense, posture, motor skill, proprioception of core, proximal hip and LE for self care, mobility, lifting, and ambulation/stair navigation      Manual Treatments:  PROM / STM / Oscillations-Mobs:  G-I, II, III, IV (PA's, Inf., Post.)  [x] (69226) Provided manual therapy to mobilize LE, proximal hip and/or LS spine soft tissue/joints for the purpose of modulating pain, promoting relaxation,  increasing ROM, reducing/eliminating soft tissue swelling/inflammation/restriction, improving soft tissue extensibility and allowing for proper ROM for normal function with self care, mobility, lifting and ambulation. Modalities:  VASO with TENS x 15 min     Charges:  Timed Code Treatment Minutes: 55   Total Treatment Minutes: 70     [] EVAL  [x] BK(99156) x  2   [] IONTO  [x] NMR (29640) x  1   [] VASO  [x] Manual (75789) x  1    [] Other:  [] TA x       [] Mech Traction (82039)  [] ES(attended) (49779)      [x] ES (un) (45474):     GOALS:   Patient stated goal: walk without pain     Therapist goals for Patient:   Short Term Goals: To be achieved in: 2 weeks  1. Independent in HEP and progression per patient tolerance, in order to prevent re-injury. 2. Patient will have a decrease in pain to facilitate improvement in movement, function, and ADLs as indicated by Functional Deficits.     Long Term Goals: To be achieved in: 6 weeks  1. Disability index score of 25% or less for the LEFS to assist with reaching prior level of function. 2. Patient will demonstrate increased AROM to [facility] to allow for proper joint functioning as indicated by patients Functional Deficits. 3. Patient will demonstrate an increase in Strength to good proximal hip strength and control, within 5lb HHD in LE to allow for proper functional mobility as indicated by patients Functional Deficits. 4. Patient will return to walking without AD x 30 min without increased symptoms or restriction. 5. Pt will tolerate standing x 30 min for ADLS     Progression Towards Functional goals:  [x] Patient is progressing as expected towards functional goals listed. [] Progression is slowed due to complexities listed. [] Progression has been slowed due to co-morbidities.   [] Plan just implemented, too soon to assess goals progression  [] Other:     ASSESSMENT: Increases in ankle ROM today in

## 2018-08-21 ENCOUNTER — HOSPITAL ENCOUNTER (OUTPATIENT)
Dept: PHYSICAL THERAPY | Age: 33
Discharge: HOME | End: 2018-08-22
Admitting: PODIATRIST

## 2018-08-21 NOTE — FLOWSHEET NOTE
Anna [address], [facility]    Physical Therapy Daily Treatment Note  Date:  2018    Patient Name:  Allyson Garcia    :  1985  MRN: [id number]  Restrictions/Precautions:    Medical/Treatment Diagnosis Information:  · Diagnosis: trimalleolar fracture of R ankle, closed S82.851A DOS: 3/2/18  · Treatment Diagnosis: R ankle pain  Insurance/Certification information:  PT Insurance Information: [place] Medicaid, no copay, 30 OP visits  Physician Information:  Referring Practitioner: Hoang Horan DPM  Plan of care signed (Y/N):     Date of Patient follow up with Physician:     G-Code (if applicable):      Date G-Code Applied:         Progress Note: [x]  Yes  []  No  Next due by: Visit #10       Latex Allergy:  [x]NO      []YES  Preferred Language for Healthcare:   [x]English       []other:    Visit # Insurance Allowable   26 30     Pain level:  1/10     SUBJECTIVE:  Still having some difficult descending stairs, but otherwise is back to normal activities. OBJECTIVE:   Observation:decreasing antalgic gait; moderated effusion of R ankle  Test measurements:     2018:    -2 deg DF    54 deg PF    18 deg INV    6 deg EV  18 Ankle DF -1 deg  18: inv 22 deg, ev 8 deg, DF 0    RESTRICTIONS/PRECAUTIONS:     Exercises/Interventions:     Therapeutic Ex  Resistance Sets/sec Reps Notes   Bike  6'     Bridging on FR  210   Theraband DF/PF/IV/EV blue 2 10 each     1 30    BAPS  2 10 DF/PF  IN/EV      standing incline stretch  30\" 4    Fwd/Lat step ups 6\" 1 20    Heel tap 4\" 1 20    Heel raises  3 10    Sliding lunges  1 20 Leg press heel raises 60# 1 30    Leg press, SL 60# 1 30           Manual Intervention         ankle PROM       Tib/Fem Mobs       Patella Mobs       Ankle mobs 4 min   Lateral tibial glides, anterior fibular mobs   IA STM 8 min Plantar fascia, achilles, lateral ankle       NMR re-education        Swiss/Biofeedback 10/10       G.  Med modulating pain, promoting relaxation,  increasing ROM, reducing/eliminating soft tissue swelling/inflammation/restriction, improving soft tissue extensibility and allowing for proper ROM for normal function with self care, mobility, lifting and ambulation. Modalities:  VASO with TENS x 15 min     Charges:  Timed Code Treatment Minutes: 55   Total Treatment Minutes: 70     [] EVAL  [x] LM(86527) x  2   [] IONTO  [x] NMR (10754) x  1   [] VASO  [x] Manual (49600) x  1    [] Other:  [] TA x       [] Mech Traction (81218)  [] ES(attended) (09996)      [x] ES (un) (01757):     GOALS:   Patient stated goal: walk without pain     Therapist goals for Patient:   Short Term Goals: To be achieved in: 2 weeks  1. Independent in HEP and progression per patient tolerance, in order to prevent re-injury. 2. Patient will have a decrease in pain to facilitate improvement in movement, function, and ADLs as indicated by Functional Deficits.     Long Term Goals: To be achieved in: 6 weeks  1. Disability index score of 25% or less for the LEFS to assist with reaching prior level of function. 2. Patient will demonstrate increased AROM to [facility] to allow for proper joint functioning as indicated by patients Functional Deficits. 3. Patient will demonstrate an increase in Strength to good proximal hip strength and control, within 5lb HHD in LE to allow for proper functional mobility as indicated by patients Functional Deficits. 4. Patient will return to walking without AD x 30 min without increased symptoms or restriction. 5. Pt will tolerate standing x 30 min for ADLS     Progression Towards Functional goals:  [x] Patient is progressing as expected towards functional goals listed. [] Progression is slowed due to complexities listed. [] Progression has been slowed due to co-morbidities.   [] Plan just implemented, too soon to assess goals progression  [] Other:     ASSESSMENT: Natacha Wilson demonstrates good ankle strength in all directions. Primary impairment is decreased DF PROM which limits function with stairs. Pt is a good candidate for d/c to HEP at this time. Treatment/Activity Tolerance:  [x] Patient tolerated treatment well [] Patient limited by fatique  [x] Patient limited by pain  [] Patient limited by other medical complications  [] Other:     Prognosis: [x] Good [] Fair  [] Poor    Patient Requires Follow-up: [x] Yes  [] No    PLAN:   [x] Continue per plan of care [] Alter current plan (see comments)  [] Plan of care initiated [] Hold pending MD visit [] Discharge        Electronically signed by:  Patt Oakes PT

## 2018-09-01 ENCOUNTER — HOSPITAL ENCOUNTER (OUTPATIENT)
Dept: PHYSICAL THERAPY | Age: 33
Discharge: HOME | End: 2018-09-01
Attending: PODIATRIST | Admitting: PODIATRIST

## 2018-10-18 ENCOUNTER — OFFICE VISIT (OUTPATIENT)
Dept: ORTHOPEDIC SURGERY | Age: 33
End: 2018-10-18
Payer: COMMERCIAL

## 2018-10-18 VITALS
BODY MASS INDEX: 31.66 KG/M2 | WEIGHT: 190.04 LBS | HEIGHT: 65 IN | DIASTOLIC BLOOD PRESSURE: 89 MMHG | HEART RATE: 83 BPM | SYSTOLIC BLOOD PRESSURE: 139 MMHG

## 2018-10-18 DIAGNOSIS — S82.851S TRIMALLEOLAR FRACTURE OF ANKLE, CLOSED, RIGHT, SEQUELA: Primary | ICD-10-CM

## 2018-10-18 PROCEDURE — 1036F TOBACCO NON-USER: CPT | Performed by: PODIATRIST

## 2018-10-18 PROCEDURE — G8427 DOCREV CUR MEDS BY ELIG CLIN: HCPCS | Performed by: PODIATRIST

## 2018-10-18 PROCEDURE — G8484 FLU IMMUNIZE NO ADMIN: HCPCS | Performed by: PODIATRIST

## 2018-10-18 PROCEDURE — G8417 CALC BMI ABV UP PARAM F/U: HCPCS | Performed by: PODIATRIST

## 2018-10-18 PROCEDURE — 99212 OFFICE O/P EST SF 10 MIN: CPT | Performed by: PODIATRIST

## 2018-10-18 RX ORDER — PREDNISONE 10 MG/1
TABLET ORAL
Qty: 26 TABLET | Refills: 0 | Status: SHIPPED | OUTPATIENT
Start: 2018-10-18 | End: 2018-11-13

## 2018-11-13 ENCOUNTER — OFFICE VISIT (OUTPATIENT)
Dept: [facility] CLINIC | Age: 33
End: 2018-11-13
Payer: COMMERCIAL

## 2018-11-13 VITALS
RESPIRATION RATE: 12 BRPM | TEMPERATURE: 97.5 F | WEIGHT: 210 LBS | BODY MASS INDEX: 34.99 KG/M2 | DIASTOLIC BLOOD PRESSURE: 82 MMHG | HEART RATE: 99 BPM | HEIGHT: 65 IN | OXYGEN SATURATION: 97 % | SYSTOLIC BLOOD PRESSURE: 134 MMHG

## 2018-11-13 DIAGNOSIS — F33.1 MAJOR DEPRESSIVE DISORDER, RECURRENT, MODERATE (HCC): ICD-10-CM

## 2018-11-13 DIAGNOSIS — Z00.00 PREVENTATIVE HEALTH CARE: Primary | ICD-10-CM

## 2018-11-13 DIAGNOSIS — J45.30 REACTIVE AIRWAY DISEASE, MILD PERSISTENT, UNCOMPLICATED: ICD-10-CM

## 2018-11-13 PROCEDURE — 99385 PREV VISIT NEW AGE 18-39: CPT | Performed by: INTERNAL MEDICINE

## 2018-11-13 PROCEDURE — G8484 FLU IMMUNIZE NO ADMIN: HCPCS | Performed by: INTERNAL MEDICINE

## 2018-11-13 RX ORDER — EPINEPHRINE 0.3 MG/.3ML
INJECTION SUBCUTANEOUS
Qty: 1 EACH | Refills: 1 | Status: SHIPPED | OUTPATIENT
Start: 2018-11-13 | End: 2021-09-01

## 2018-11-13 RX ORDER — SERTRALINE HYDROCHLORIDE 25 MG/1
25 TABLET, FILM COATED ORAL DAILY
Qty: 90 TABLET | Refills: 1 | Status: SHIPPED | OUTPATIENT
Start: 2018-11-13 | End: 2019-04-27 | Stop reason: SDUPTHER

## 2018-11-13 RX ORDER — ALBUTEROL SULFATE 90 UG/1
2 AEROSOL, METERED RESPIRATORY (INHALATION) EVERY 6 HOURS PRN
Qty: 3 INHALER | Refills: 1 | Status: SHIPPED | OUTPATIENT
Start: 2018-11-13 | End: 2020-06-30

## 2018-11-13 ASSESSMENT — ENCOUNTER SYMPTOMS
COUGH: 0
BACK PAIN: 0
NAUSEA: 0
SHORTNESS OF BREATH: 0
VOMITING: 0
ABDOMINAL PAIN: 0
DIARRHEA: 0

## 2018-11-13 NOTE — PROGRESS NOTES
range of motion. She exhibits no edema or tenderness. Lymphadenopathy:     She has no cervical adenopathy. Neurological: She is alert and oriented to person, place, and time. She has normal reflexes. No cranial nerve deficit. Skin: Skin is warm and dry. No rash noted. She is not diaphoretic. No erythema. Psychiatric: She has a normal mood and affect. Her behavior is normal.   Vitals reviewed. Assessment:  Reggie Foley is a 34 y.o. female, with a history of gullian barre syndrome, right ankle fracture s/p surgery, pseudoseizure, depression who presents for a new patient visit. Plan:       1. Preventative health care    - CBC Auto Differential; Future  - Hepatic Function Panel; Future  - Hemoglobin A1C; Future  - HIV Screen; Future  - Lipid Panel; Future  - Renal Function Panel; Future  - TSH with Reflex; Future    2. Major depressive disorder, recurrent, moderate (HCC)    - sertraline (ZOLOFT) 25 MG tablet; Take 1 tablet by mouth daily  Dispense: 90 tablet; Refill: 1    3. Reactive airway disease, mild persistent, uncomplicated    - albuterol sulfate HFA (PROVENTIL HFA) 108 (90 Base) MCG/ACT inhaler; Inhale 2 puffs into the lungs every 6 hours as needed for Wheezing or Shortness of Breath MAY SUBSTITUTE  Dispense: 3 Inhaler; Refill: 1    4. Allergies:     -ordered EPI pen for pt to use at home       Return in about 1 year (around 11/13/2019) for annual visit.

## 2018-11-13 NOTE — PATIENT INSTRUCTIONS
condoms. For men  · Tests for sexually transmitted infections (STIs). Ask whether you should have tests for STIs. You may be at risk if you have sex with more than one person, especially if you do not wear a condom. · Testicular cancer exam. Ask your doctor whether you should check your testicles regularly. · Prostate exam. Talk to your doctor about whether you should have a blood test (called a PSA test) for prostate cancer. Experts differ on whether and when men should have this test. Some experts suggest it if you are older than 39 and are -American or have a father or brother who got prostate cancer when he was younger than 72. When should you call for help? Watch closely for changes in your health, and be sure to contact your doctor if you have any problems or symptoms that concern you. Where can you learn more? Go to [email or web address] and sign in to your [employer] account. Enter P072 in the PriceBaba box to learn more about \"Well Visit, Ages 25 to 48: Care Instructions. \"     If you do not have an account, please click on the \"Sign Up Now\" link. Current as of: March 29, 2018  Content Version: 11.8  © 0780-1964 Healthwise, Incorporated. Care instructions adapted under license by [facility] ([facility]). If you have questions about a medical condition or this instruction, always ask your healthcare professional. [address]  any warranty or liability for your use of this information.

## 2019-02-21 ENCOUNTER — OFFICE VISIT (OUTPATIENT)
Dept: ORTHOPEDIC SURGERY | Age: 34
End: 2019-02-21
Payer: COMMERCIAL

## 2019-02-21 VITALS
BODY MASS INDEX: 35 KG/M2 | HEART RATE: 91 BPM | HEIGHT: 65 IN | SYSTOLIC BLOOD PRESSURE: 138 MMHG | DIASTOLIC BLOOD PRESSURE: 80 MMHG | WEIGHT: 210.1 LBS

## 2019-02-21 DIAGNOSIS — S82.851S TRIMALLEOLAR FRACTURE OF ANKLE, CLOSED, RIGHT, SEQUELA: Primary | ICD-10-CM

## 2019-02-21 PROCEDURE — G8484 FLU IMMUNIZE NO ADMIN: HCPCS | Performed by: PODIATRIST

## 2019-02-21 PROCEDURE — 99213 OFFICE O/P EST LOW 20 MIN: CPT | Performed by: PODIATRIST

## 2019-02-21 PROCEDURE — 1036F TOBACCO NON-USER: CPT | Performed by: PODIATRIST

## 2019-02-21 PROCEDURE — G8427 DOCREV CUR MEDS BY ELIG CLIN: HCPCS | Performed by: PODIATRIST

## 2019-02-21 PROCEDURE — G8417 CALC BMI ABV UP PARAM F/U: HCPCS | Performed by: PODIATRIST

## 2019-02-21 RX ORDER — ALBUTEROL SULFATE 90 UG/1
2 AEROSOL, METERED RESPIRATORY (INHALATION)
COMMUNITY
Start: 2009-04-07 | End: 2020-08-06 | Stop reason: SDUPTHER

## 2019-04-27 DIAGNOSIS — F33.1 MAJOR DEPRESSIVE DISORDER, RECURRENT, MODERATE (HCC): ICD-10-CM

## 2019-04-29 RX ORDER — SERTRALINE HYDROCHLORIDE 25 MG/1
TABLET, FILM COATED ORAL
Qty: 90 TABLET | Refills: 0 | Status: SHIPPED | OUTPATIENT
Start: 2019-04-29 | End: 2019-08-10 | Stop reason: SDUPTHER

## 2019-05-26 ENCOUNTER — HOSPITAL ENCOUNTER (EMERGENCY)
Age: 34
Discharge: HOME | End: 2019-05-26
Payer: COMMERCIAL

## 2019-05-26 ENCOUNTER — APPOINTMENT (OUTPATIENT)
Dept: GENERAL RADIOLOGY | Age: 34
End: 2019-05-26
Payer: COMMERCIAL

## 2019-05-26 VITALS
OXYGEN SATURATION: 99 % | WEIGHT: 190 LBS | HEART RATE: 108 BPM | BODY MASS INDEX: 31.65 KG/M2 | SYSTOLIC BLOOD PRESSURE: 170 MMHG | HEIGHT: 65 IN | RESPIRATION RATE: 18 BRPM | DIASTOLIC BLOOD PRESSURE: 108 MMHG | TEMPERATURE: 98.7 F

## 2019-05-26 DIAGNOSIS — S93.401A SPRAIN OF RIGHT ANKLE, UNSPECIFIED LIGAMENT, INITIAL ENCOUNTER: Primary | ICD-10-CM

## 2019-05-26 PROCEDURE — 6370000000 HC RX 637 (ALT 250 FOR IP): Performed by: NURSE PRACTITIONER

## 2019-05-26 PROCEDURE — 73610 X-RAY EXAM OF ANKLE: CPT

## 2019-05-26 PROCEDURE — 99283 EMERGENCY DEPT VISIT LOW MDM: CPT

## 2019-05-26 RX ORDER — IBUPROFEN 800 MG/1
800 TABLET ORAL EVERY 8 HOURS PRN
Qty: 20 TABLET | Refills: 0 | Status: SHIPPED | OUTPATIENT
Start: 2019-05-26 | End: 2019-05-28 | Stop reason: SDUPTHER

## 2019-05-26 RX ORDER — IBUPROFEN 800 MG/1
800 TABLET ORAL ONCE
Status: COMPLETED | OUTPATIENT
Start: 2019-05-26 | End: 2019-05-26

## 2019-05-26 RX ORDER — OXYCODONE HYDROCHLORIDE AND ACETAMINOPHEN 5; 325 MG/1; MG/1
1-2 TABLET ORAL EVERY 6 HOURS PRN
Qty: 10 TABLET | Refills: 0 | Status: SHIPPED | OUTPATIENT
Start: 2019-05-26 | End: 2019-05-28

## 2019-05-26 RX ORDER — OXYCODONE HYDROCHLORIDE AND ACETAMINOPHEN 5; 325 MG/1; MG/1
2 TABLET ORAL ONCE
Status: COMPLETED | OUTPATIENT
Start: 2019-05-26 | End: 2019-05-26

## 2019-05-26 RX ADMIN — IBUPROFEN 800 MG: 800 TABLET, FILM COATED ORAL at 22:19

## 2019-05-26 RX ADMIN — OXYCODONE HYDROCHLORIDE AND ACETAMINOPHEN 2 TABLET: 5; 325 TABLET ORAL at 22:19

## 2019-05-26 ASSESSMENT — PAIN SCALES - GENERAL
PAINLEVEL_OUTOF10: 8
PAINLEVEL_OUTOF10: 8

## 2019-05-26 ASSESSMENT — PAIN DESCRIPTION - ORIENTATION: ORIENTATION: RIGHT

## 2019-05-26 ASSESSMENT — PAIN DESCRIPTION - PAIN TYPE: TYPE: ACUTE PAIN

## 2019-05-26 ASSESSMENT — PAIN DESCRIPTION - LOCATION: LOCATION: ANKLE

## 2019-05-27 ASSESSMENT — ENCOUNTER SYMPTOMS
SHORTNESS OF BREATH: 0
VOMITING: 0
CHEST TIGHTNESS: 0
DIARRHEA: 0
ABDOMINAL PAIN: 0
NAUSEA: 0

## 2019-05-27 NOTE — ED PROVIDER NOTES
[facility]        Pt Name: Radha Ghosh  MRN: [id number]  [place] 1985  Date of evaluation: 5/26/2019  Provider: KEV Colin - FAHAD  PCP: Benedict Barragan MD    This patient was not seen and evaluated by the attending physician No att. providers found. CHIEF COMPLAINT       Chief Complaint   Patient presents with    Ankle Pain     Patient presents to ER with complaints of right ankle pain. HISTORY OF PRESENT ILLNESS   (Location/Symptom, Timing/Onset, Context/Setting, Quality, Duration, Modifying Factors, Severity)  Note limiting factors. Radha Ghosh is a 30 y.o. female complains of right ankle pain and swelling following injury. Reports over reduction with internal fixation last year with continued problems since repair. She did have an MRI in March and has followed up with her podiatrist, plan for revision of surgery upcoming. She is concerned for refracture. Pain with ambulation, no mitigating symptoms. Mild swelling present. Worse with weight bearing/ambulation    Denies any headache, fever, lightheadedness, dizziness, visual disturbances. No chest pain or pressure. No neck or back pain. No shortness of breath, cough, or congestion. No abdominal pain, nausea, vomiting, diarrhea, constipation, or dysuria. No rash. Nursing Notes were all reviewed and agreed with or any disagreements were addressed  in the HPI. REVIEW OF SYSTEMS    (2-9 systems for level 4, 10 or more for level 5)     Review of Systems   Constitutional: Negative for activity change, chills and fever. Respiratory: Negative for chest tightness and shortness of breath. Cardiovascular: Negative for chest pain. Gastrointestinal: Negative for abdominal pain, diarrhea, nausea and vomiting. Genitourinary: Negative for dysuria. Musculoskeletal: Positive for joint swelling.         Right ankle pain/swelling   All other systems reviewed and are negative. Positives and Pertinent negatives as per HPI. Except as noted abovein the ROS, all other systems were reviewed and negative. PAST MEDICAL HISTORY     Past Medical History:   Diagnosis Date    Asthma     Chicken pox     Degenerative disc disease, lumbar     Guillain Barré syndrome ([facility] [address] 8/4/2016    Hyperlipidemia     Kidney disease     Meniere's disease     Seizure ([facility] [address]     none since 2013    Trimalleolar fracture of ankle, closed, right, initial encounter 2/27/2018    Viral hepatitis          SURGICAL HISTORY     Past Surgical History:   Procedure Laterality Date    ANKLE FRACTURE SURGERY Right 03/02/2018    orif trimalleolar     TONSILLECTOMY AND ADENOIDECTOMY  1994    WISDOM TOOTH EXTRACTION           Leonid Marcano       Discharge Medication List as of 5/26/2019 10:17 PM      CONTINUE these medications which have NOT CHANGED    Details   sertraline (ZOLOFT) 25 MG tablet TAKE 1 TABLET BY MOUTH ONE TIME A DAY , Disp-90 tablet, R-0Normal      norgestimate-ethinyl estradiol (SPRINTEC 28) 0.25-35 MG-MCG per tablet Take 1 tablet by mouth daily. !! albuterol sulfate HFA (VENTOLIN HFA) 108 (90 Base) MCG/ACT inhaler Inhale 2 puffs into the lungsHistorical Med      EPINEPHrine (EPIPEN) 0.3 MG/0.3ML SOAJ injection Use as directed for allergic reaction, Disp-1 each, R-1Normal      !! albuterol sulfate HFA (PROVENTIL HFA) 108 (90 Base) MCG/ACT inhaler Inhale 2 puffs into the lungs every 6 hours as needed for Wheezing or Shortness of Breath MAY SUBSTITUTE, Disp-3 Inhaler, R-1Normal      fish oil-omega-3 fatty acids 1000 MG capsule Take 2 g by mouth daily. Flaxseed, Linseed, 1000 MG CAPS Take 1 capsule by mouth daily. !! - Potential duplicate medications found. Please discuss with provider. ALLERGIES     Azithromycin; Cortisone; Food; Promethazine; Keppra [levetiracetam]; Penicillins;  Adhesive tape; Lamictal [lamotrigine]; and Oxcarbazepine    FAMILYHISTORY       Family History   Problem Relation Age of Onset    High Cholesterol Father     Diabetes Paternal Grandmother     High Cholesterol Paternal Grandmother     Diabetes Paternal Grandfather     High Cholesterol Paternal Grandfather     Cancer Paternal Grandfather         colon    Arthritis Mother     Depression Mother     Depression Sister     Schizophrenia Sister     Heart Defect Brother     Mult Sclerosis Paternal Aunt           SOCIAL HISTORY       Social History     Socioeconomic History    Marital status:      Spouse name: None    Number of children: None    Years of education: None    Highest education level: None   Occupational History    None   Social Needs    Financial resource strain: None    Food insecurity:     Worry: None     Inability: None    Transportation needs:     Medical: None     Non-medical: None   Tobacco Use    Smoking status: Never Smoker    Smokeless tobacco: Never Used   Substance and Sexual Activity    Alcohol use: Yes     Alcohol/week: 0.0 oz     Comment: rare celebratory    Drug use:  Yes    Sexual activity: Yes     Partners: Male     Birth control/protection: Pill   Lifestyle    Physical activity:     Days per week: None     Minutes per session: None    Stress: None   Relationships    Social connections:     Talks on phone: None     Gets together: None     Attends Mormonism service: None     Active member of club or organization: None     Attends meetings of clubs or organizations: None     Relationship status: None    Intimate partner violence:     Fear of current or ex partner: None     Emotionally abused: None     Physically abused: None     Forced sexual activity: None   Other Topics Concern    None   Social History Narrative    None       SCREENINGS             PHYSICAL EXAM    (up to 7 for level 4, 8 or more for level 5)     ED Triage Vitals   BP Temp Temp Source Pulse Resp SpO2 Height Weight   05/26/19 204 05/26/19 2047 05/26/19 2214 05/26/19 2047 05/26/19 2047 05/26/19 2047 05/26/19 2047 05/26/19 2047   (!) 188/133 98.1 °F (36.7 °C) Oral 114 18 98 % 5' 5\" (1.651 m) 190 lb (86.2 kg)       Physical Exam   Constitutional: She is oriented to person, place, and time. She appears well-developed and well-nourished. No distress. HENT:   Head: Normocephalic and atraumatic. Right Ear: External ear normal.   Left Ear: External ear normal.   Eyes: Right eye exhibits no discharge. Left eye exhibits no discharge. Neck: Normal range of motion. Neck supple. No JVD present. Cardiovascular: Normal rate and regular rhythm. Exam reveals no friction rub. No murmur heard. Pulmonary/Chest: Effort normal and breath sounds normal. No stridor. No respiratory distress. Abdominal: Soft. She exhibits no mass. There is no tenderness. Musculoskeletal: Normal range of motion. Right ankle: She exhibits swelling. Tenderness. Neurological: She is alert and oriented to person, place, and time. Skin: Skin is warm and dry. She is not diaphoretic. No pallor. Psychiatric: She has a normal mood and affect. Her behavior is normal.   Nursing note and vitals reviewed. DIAGNOSTIC RESULTS   LABS:    Labs Reviewed - No data to display    All other labs were within normal range or not returned as of this dictation. EKG: All EKG's are interpreted by the Emergency Department Physician who either signs orCo-signs this chart in the absence of a cardiologist.  Please see their note for interpretation of EKG. RADIOLOGY:   Non-plain film images such as CT, Ultrasound and MRI are read by the radiologist. Plain radiographic images are visualized andpreliminarily interpreted by the  ED Provider with the below findings:        Interpretation perthe Radiologist below, if available at the time of this note:    XR ANKLE RIGHT (MIN 3 VIEWS)   Final Result   1. No acute osseous abnormality   2. Status post bimalleolar and syndesmotic ORIF. [address] [place] [address],[address]

## 2019-05-27 NOTE — ED NOTES
Yannick Nicole NP aware of bp and hr and ok with d/c. Dc instructions reviewed with pt. Pt verbalized understanding. Pt given copy of discharge instructions.       Netta Meek RN  05/26/19 6435

## 2019-05-28 ENCOUNTER — OFFICE VISIT (OUTPATIENT)
Dept: [facility] CLINIC | Age: 34
End: 2019-05-28
Payer: COMMERCIAL

## 2019-05-28 VITALS
OXYGEN SATURATION: 99 % | DIASTOLIC BLOOD PRESSURE: 109 MMHG | HEIGHT: 65 IN | WEIGHT: 211 LBS | SYSTOLIC BLOOD PRESSURE: 161 MMHG | HEART RATE: 98 BPM | TEMPERATURE: 98.2 F | BODY MASS INDEX: 35.16 KG/M2

## 2019-05-28 DIAGNOSIS — S93.401D SPRAIN OF RIGHT ANKLE, UNSPECIFIED LIGAMENT, SUBSEQUENT ENCOUNTER: Primary | ICD-10-CM

## 2019-05-28 PROCEDURE — 99213 OFFICE O/P EST LOW 20 MIN: CPT | Performed by: INTERNAL MEDICINE

## 2019-05-28 PROCEDURE — 1036F TOBACCO NON-USER: CPT | Performed by: INTERNAL MEDICINE

## 2019-05-28 PROCEDURE — G8417 CALC BMI ABV UP PARAM F/U: HCPCS | Performed by: INTERNAL MEDICINE

## 2019-05-28 PROCEDURE — G8427 DOCREV CUR MEDS BY ELIG CLIN: HCPCS | Performed by: INTERNAL MEDICINE

## 2019-05-28 RX ORDER — IBUPROFEN 800 MG/1
800 TABLET ORAL EVERY 8 HOURS PRN
Qty: 90 TABLET | Refills: 0 | Status: SHIPPED | OUTPATIENT
Start: 2019-05-28 | End: 2021-09-01

## 2019-05-28 RX ORDER — TIZANIDINE 2 MG/1
2 TABLET ORAL EVERY 8 HOURS PRN
Qty: 60 TABLET | Refills: 1 | Status: SHIPPED | OUTPATIENT
Start: 2019-05-28 | End: 2020-06-30

## 2019-05-28 RX ORDER — TRAMADOL HYDROCHLORIDE 50 MG/1
50 TABLET ORAL EVERY 6 HOURS PRN
Qty: 28 TABLET | Refills: 0 | Status: SHIPPED | OUTPATIENT
Start: 2019-05-28 | End: 2019-06-04

## 2019-05-28 RX ORDER — TRAMADOL HYDROCHLORIDE 50 MG/1
50 TABLET ORAL EVERY 6 HOURS PRN
Qty: 20 TABLET | Refills: 0 | Status: CANCELLED | OUTPATIENT
Start: 2019-05-28 | End: 2019-06-02

## 2019-05-28 ASSESSMENT — ENCOUNTER SYMPTOMS
BACK PAIN: 0
VOMITING: 0
COUGH: 0
ABDOMINAL PAIN: 0
DIARRHEA: 0
NAUSEA: 0
CONSTIPATION: 0
SHORTNESS OF BREATH: 0

## 2019-05-28 NOTE — PATIENT INSTRUCTIONS
Patient Education        Ankle Sprain: Care Instructions  Your Care Instructions    An ankle sprain can happen when you twist your ankle. The ligaments that support the ankle can get stretched and torn. Often the ankle is swollen and painful. Ankle sprains may take from several weeks to several months to heal. Usually, the more pain and swelling you have, the more severe your ankle sprain is and the longer it will take to heal. You can heal faster and regain strength in your ankle with good home treatment. It is very important to give your ankle time to heal completely, so that you do not easily hurt your ankle again. Follow-up care is a key part of your treatment and safety. Be sure to make and go to all appointments, and call your doctor if you are having problems. It's also a good idea to know your test results and keep a list of the medicines you take. How can you care for yourself at home? · Prop up your foot on pillows as much as possible for the next 3 days. Try to keep your ankle above the level of your heart. This will help reduce the swelling. · Follow your doctor's directions for wearing a splint or elastic bandage. Wrapping the ankle may help reduce or prevent swelling. · Your doctor may give you a splint, a brace, an air stirrup, or another form of ankle support to protect your ankle until it is healed. Wear it as directed while your ankle is healing. Do not remove it unless your doctor tells you to. After your ankle has healed, ask your doctor whether you should wear the brace when you exercise. · Put ice or cold packs on your injured ankle for 10 to 20 minutes at a time. Try to do this every 1 to 2 hours for the next 3 days (when you are awake) or until the swelling goes down. Put a thin cloth between the ice and your skin. · You may need to use crutches until you can walk without pain. If you do use crutches, try to bear some weight on your injured ankle if you can do so without pain.  This helps the ankle heal.  · Take pain medicines exactly as directed. ? If the doctor gave you a prescription medicine for pain, take it as prescribed. ? If you are not taking a prescription pain medicine, ask your doctor if you can take an over-the-counter medicine. · If you have been given ankle exercises to do at home, do them exactly as instructed. These can promote healing and help prevent lasting weakness. When should you call for help? Call your doctor now or seek immediate medical care if:    · Your pain is getting worse.     · Your swelling is getting worse.     · Your splint feels too tight or you are unable to loosen it.    Watch closely for changes in your health, and be sure to contact your doctor if:    · You are not getting better after 1 week. Where can you learn more? Go to [email or web address]://Fonality.ITao. org and sign in to your [employer] account. Enter Z076 in the MomentFeed box to learn more about \"Ankle Sprain: Care Instructions. \"     If you do not have an account, please click on the \"Sign Up Now\" link. Current as of: September 20, 2018  Content Version: 12.0  © 2006-2019 [employer]. Care instructions adapted under license by [employer] ([facility]). If you have questions about a medical condition or this instruction, always ask your healthcare professional. [address] any warranty or liability for your use of this information. Patient Education         RICE: Rest, Ice, Compression, Elevation (01:48)  Your health professional recommends that you watch this short online health video. Learn steps you can take at home to reduce pain and swelling after a sprain or strain. How to watch the video    Scan the QR code   OR Visit the website    https://hwi. se/[email or web address]   Current as of: September 20, 2018  Content Version: 12.0  © 2006-2019 [employer]. Care instructions adapted under license by [employer] [facility]).  If you have questions about a medical condition or this instruction, always ask your healthcare professional. [place] [address] any warranty or liability for your use of this information.

## 2019-05-28 NOTE — PROGRESS NOTES
Linseed, 1000 MG CAPS Take 1 capsule by mouth daily. No current facility-administered medications for this visit. Allergies   Allergen Reactions    Azithromycin Diarrhea    Cortisone Anaphylaxis     fulll blackout for 5 minutes, w/ smelling salts to come to consciousness    Food Anaphylaxis     Red onions    Promethazine Other (See Comments)     Patient states siezures  SEIZURES    Keppra [Levetiracetam] Other (See Comments)     SEIZURES    Penicillins     Sulfa Antibiotics     Adhesive Tape Rash    Lamictal [Lamotrigine] Rash    Oxcarbazepine Rash       Review of Systems   Constitutional: Negative for chills, fatigue and fever. Eyes: Negative for visual disturbance. Respiratory: Negative for cough and shortness of breath. Cardiovascular: Negative for chest pain, palpitations and leg swelling. Gastrointestinal: Negative for abdominal pain, constipation, diarrhea, nausea and vomiting. Musculoskeletal: Positive for joint swelling. Negative for arthralgias and back pain. Skin: Negative for rash. Neurological: Negative for dizziness, light-headedness and headaches. Psychiatric/Behavioral: Negative for dysphoric mood. The patient is not nervous/anxious. Vitals:    05/28/19 1545 05/28/19 1548   BP: (!) 166/112 (!) 161/109   Pulse: 98    Temp: 98.2 °F (36.8 °C)    SpO2: 99%    Weight: 211 lb (95.7 kg)    Height: 5' 5\" (1.651 m)        Physical Exam   Constitutional: She is oriented to person, place, and time. She appears well-developed and well-nourished. No distress. HENT:   Head: Normocephalic and atraumatic. Nose: Nose normal.   Mouth/Throat: No oropharyngeal exudate. Eyes: Pupils are equal, round, and reactive to light. Conjunctivae and EOM are normal. Right eye exhibits no discharge. Left eye exhibits no discharge. Neck: Normal range of motion. Neck supple. Cardiovascular: Normal rate, regular rhythm and normal heart sounds.  Exam reveals no gallop and no friction rub. No murmur heard. Pulmonary/Chest: Effort normal and breath sounds normal. No respiratory distress. She has no wheezes. Abdominal: Soft. Bowel sounds are normal. She exhibits no distension. There is no tenderness. There is no rebound. Musculoskeletal: Normal range of motion. She exhibits edema (right ankle swelling near medial malleous). She exhibits no tenderness. Pain with flexion of right ankle    Neurological: She is alert and oriented to person, place, and time. She has normal reflexes. No cranial nerve deficit. Skin: Skin is warm and dry. No rash noted. She is not diaphoretic. No erythema. Psychiatric: She has a normal mood and affect. Her behavior is normal.   Vitals reviewed. Assessment:  Armida Frances is a 30 y.o. female, with a history of gullian barre syndrome, right ankle fracture s/p surgery, pseudoseizure, depression , who presents for an acute visit. Plan:       1. Sprain of right ankle, unspecified ligament, subsequent encounter    - Sixto Li MD, Orthopedic Surgery (Foot, Ankle) - [facility]  - tiZANidine (ZANAFLEX) 2 MG tablet; Take 1 tablet by mouth every 8 hours as needed (ankle pain)  Dispense: 60 tablet; Refill: 1  - ibuprofen (ADVIL;MOTRIN) 800 MG tablet; Take 1 tablet by mouth every 8 hours as needed for Pain or Fever  Dispense: 90 tablet; Refill: 0  - traMADol (ULTRAM) 50 MG tablet; Take 1 tablet by mouth every 6 hours as needed for Pain for up to 7 days. Intended supply: 7 days. Take lowest dose possible to manage pain  Dispense: 28 tablet; Refill: 0    2. Hypertension:  Pt's blood pressure is very high today and in ED. Her blood pressures where previously normal.  Suspect this is reactive to poorly controlled pain    -treat pain as above  -will monitor for future BP elevation. Return if symptoms worsen or fail to improve.

## 2019-06-11 ENCOUNTER — OFFICE VISIT (OUTPATIENT)
Dept: ORTHOPEDIC SURGERY | Age: 34
End: 2019-06-11
Payer: COMMERCIAL

## 2019-06-11 VITALS
WEIGHT: 211 LBS | HEART RATE: 89 BPM | BODY MASS INDEX: 29.54 KG/M2 | HEIGHT: 71 IN | DIASTOLIC BLOOD PRESSURE: 94 MMHG | SYSTOLIC BLOOD PRESSURE: 159 MMHG | RESPIRATION RATE: 16 BRPM

## 2019-06-11 DIAGNOSIS — M19.079 ANKLE ARTHRITIS: ICD-10-CM

## 2019-06-11 DIAGNOSIS — S82.851S TRIMALLEOLAR FRACTURE OF ANKLE, CLOSED, RIGHT, SEQUELA: Primary | ICD-10-CM

## 2019-06-11 PROCEDURE — 99203 OFFICE O/P NEW LOW 30 MIN: CPT | Performed by: ORTHOPAEDIC SURGERY

## 2019-06-12 RX ORDER — MELOXICAM 7.5 MG/1
7.5 TABLET ORAL DAILY
Qty: 30 TABLET | Refills: 0 | Status: SHIPPED | OUTPATIENT
Start: 2019-06-12 | End: 2020-06-30

## 2019-06-23 PROBLEM — M19.079 ANKLE ARTHRITIS: Status: ACTIVE | Noted: 2019-06-23

## 2019-06-23 NOTE — PROGRESS NOTES
CHIEF COMPLAINT: Right ankle pain/ post-traumatic arthritis. HISTORY:  Ms. Gabriela Rascon 30 y.o.  female presents today for the first visit for evaluation of right ankle pain which started Feb 2018 after a fracture.  She is complaining of achy  Pain. Alleviating factors:  elevation and rest. Pain is increase with standing and walking and shoe wear. Pain is sharp with first few steps, dull achy pain by the end of the day. No radiation and no numbness and tingling sensation. No other complaint. There is a history of ankle injury Feb 2018 in [place] with bimalleolar frature, and then she had ORIF 3/2/2018 by Jessica Norwood DPM at [facility]. She continued to have pain and MRI right ankle was done 3/7/2019. Past Medical History:   Diagnosis Date    Asthma     Chicken pox     Degenerative disc disease, lumbar     Guillain Barré syndrome ([facility] [address] 8/4/2016    Hyperlipidemia     Kidney disease     Meniere's disease     Seizure ([facility] [address]     none since 2013    Trimalleolar fracture of ankle, closed, right, initial encounter 2/27/2018    Viral hepatitis        Past Surgical History:   Procedure Laterality Date    ANKLE FRACTURE SURGERY Right 03/02/2018    orif trimalleolar     TONSILLECTOMY AND ADENOIDECTOMY  1994    WISDOM TOOTH EXTRACTION         Social History     Socioeconomic History    Marital status:      Spouse name: Not on file    Number of children: Not on file    Years of education: Not on file    Highest education level: Not on file   Occupational History    Not on file   Social Needs    Financial resource strain: Not on file    Food insecurity:     Worry: Not on file     Inability: Not on file    Transportation needs:     Medical: Not on file     Non-medical: Not on file   Tobacco Use    Smoking status: Never Smoker    Smokeless tobacco: Never Used   Substance and Sexual Activity    Alcohol use: Yes     Alcohol/week: 0.0 oz     Comment: rare celebratory    Drug use:  Yes    Sexual Wheezing or Shortness of Breath MAY SUBSTITUTE 3 Inhaler 1    norgestimate-ethinyl estradiol (SPRINTEC 28) 0.25-35 MG-MCG per tablet Take 1 tablet by mouth daily.  fish oil-omega-3 fatty acids 1000 MG capsule Take 2 g by mouth daily.  Flaxseed, Linseed, 1000 MG CAPS Take 1 capsule by mouth daily. No current facility-administered medications on file prior to visit. Pertinent items are noted in HPI  Review of systems reviewed from Patient History Form dated on 6/11/2019 and available in the patient's chart under the Media tab. No change. PHYSICAL EXAMINATION:  Ms. Lacey Abrams is a very pleasant 30 y.o.  female who presents today in no acute distress, awake, alert, and oriented. She is well dressed, nourished and  groomed. Patient with normal affect. Height is  5' 11\" (1.803 m), weight is 211 lb (95.7 kg), Body mass index is 29.43 kg/m². Resting respiratory rate is 16. Examination of the gait, showed that the patient walks heel-toe with minimal limp.  Examination of right ankle showing decrease ROM compare to the other side. She has intact sensation and good pedal pulses.  She has good strength in all four planes, including eversion, and has tenderness on deep palpation over the medial ankle joint line, with mild swelling compared to the other side.  The ankles are stable to drawer test bilaterally, equally.      IMAGING:  Xray's were reviewed,  3 views of the right ankle taken in ED 5/26/2019, and showed no acute fracture. Moderate arthritis affecting the ankle joint. Fibula IMN and 2 medial screws with a broken syndesmosis screw. MRI right ankle dated 3/7/2019 from [facility] was reviewed and showed Mild arthritis, but metal artifact obscure details. IMPRESSION: Right ankle pain/ post-traumatic arthritis. PLAN: I discussed with the patient the treatment options. We recommended stretching exercises of the calf which was taught to the patient today.  She will take

## 2019-07-02 ENCOUNTER — OFFICE VISIT (OUTPATIENT)
Dept: ORTHOPEDIC SURGERY | Age: 34
End: 2019-07-02
Payer: COMMERCIAL

## 2019-07-02 VITALS — HEIGHT: 71 IN | WEIGHT: 211 LBS | BODY MASS INDEX: 29.54 KG/M2 | RESPIRATION RATE: 16 BRPM

## 2019-07-02 DIAGNOSIS — S82.851S TRIMALLEOLAR FRACTURE OF ANKLE, CLOSED, RIGHT, SEQUELA: Primary | ICD-10-CM

## 2019-07-02 DIAGNOSIS — T84.84XA PAINFUL ORTHOPAEDIC HARDWARE (HCC): ICD-10-CM

## 2019-07-02 PROCEDURE — 99214 OFFICE O/P EST MOD 30 MIN: CPT | Performed by: ORTHOPAEDIC SURGERY

## 2019-08-30 ENCOUNTER — TELEPHONE (OUTPATIENT)
Dept: ORTHOPEDIC SURGERY | Age: 34
End: 2019-08-30

## 2019-10-05 ENCOUNTER — HOSPITAL ENCOUNTER (EMERGENCY)
Age: 34
Discharge: HOME | End: 2019-10-05
Payer: COMMERCIAL

## 2019-10-05 ENCOUNTER — APPOINTMENT (OUTPATIENT)
Dept: GENERAL RADIOLOGY | Age: 34
End: 2019-10-05
Payer: COMMERCIAL

## 2019-10-05 VITALS
OXYGEN SATURATION: 99 % | SYSTOLIC BLOOD PRESSURE: 172 MMHG | RESPIRATION RATE: 17 BRPM | DIASTOLIC BLOOD PRESSURE: 95 MMHG | HEART RATE: 100 BPM | TEMPERATURE: 98.2 F

## 2019-10-05 DIAGNOSIS — M25.572 ACUTE LEFT ANKLE PAIN: Primary | ICD-10-CM

## 2019-10-05 PROCEDURE — 99283 EMERGENCY DEPT VISIT LOW MDM: CPT

## 2019-10-05 PROCEDURE — 73610 X-RAY EXAM OF ANKLE: CPT

## 2019-10-05 PROCEDURE — 6370000000 HC RX 637 (ALT 250 FOR IP): Performed by: PHYSICIAN ASSISTANT

## 2019-10-05 RX ORDER — ACETAMINOPHEN 500 MG
1000 TABLET ORAL ONCE
Status: COMPLETED | OUTPATIENT
Start: 2019-10-05 | End: 2019-10-05

## 2019-10-05 RX ADMIN — ACETAMINOPHEN 1000 MG: 500 TABLET, FILM COATED ORAL at 10:53

## 2019-10-05 ASSESSMENT — PAIN SCALES - GENERAL
PAINLEVEL_OUTOF10: 7
PAINLEVEL_OUTOF10: 6

## 2019-10-05 ASSESSMENT — PAIN DESCRIPTION - PAIN TYPE: TYPE: ACUTE PAIN

## 2019-12-22 DIAGNOSIS — F33.1 MAJOR DEPRESSIVE DISORDER, RECURRENT, MODERATE (HCC): ICD-10-CM

## 2019-12-24 RX ORDER — SERTRALINE HYDROCHLORIDE 25 MG/1
TABLET, FILM COATED ORAL
Qty: 30 TABLET | Refills: 2 | Status: SHIPPED | OUTPATIENT
Start: 2019-12-24 | End: 2020-04-03 | Stop reason: SDUPTHER

## 2020-04-03 ENCOUNTER — TELEPHONE (OUTPATIENT)
Dept: [facility] CLINIC | Age: 35
End: 2020-04-03

## 2020-04-03 RX ORDER — SERTRALINE HYDROCHLORIDE 25 MG/1
TABLET, FILM COATED ORAL
Qty: 30 TABLET | Refills: 5 | Status: SHIPPED | OUTPATIENT
Start: 2020-04-03 | End: 2020-06-30

## 2020-06-22 ENCOUNTER — TELEPHONE (OUTPATIENT)
Dept: FAMILY MEDICINE CLINIC | Age: 35
End: 2020-06-22

## 2020-06-24 ENCOUNTER — TELEPHONE (OUTPATIENT)
Dept: FAMILY MEDICINE CLINIC | Age: 35
End: 2020-06-24

## 2020-06-25 ENCOUNTER — HOSPITAL ENCOUNTER (OUTPATIENT)
Dept: ULTRASOUND IMAGING | Age: 35
Discharge: HOME | End: 2020-06-25
Payer: COMMERCIAL

## 2020-06-25 PROCEDURE — 76856 US EXAM PELVIC COMPLETE: CPT

## 2020-06-25 PROCEDURE — 76830 TRANSVAGINAL US NON-OB: CPT

## 2020-06-29 ENCOUNTER — TELEPHONE (OUTPATIENT)
Dept: [facility] CLINIC | Age: 35
End: 2020-06-29

## 2020-06-30 ENCOUNTER — OFFICE VISIT (OUTPATIENT)
Dept: [facility] CLINIC | Age: 35
End: 2020-06-30
Payer: COMMERCIAL

## 2020-06-30 VITALS
SYSTOLIC BLOOD PRESSURE: 160 MMHG | DIASTOLIC BLOOD PRESSURE: 90 MMHG | BODY MASS INDEX: 30.54 KG/M2 | HEART RATE: 95 BPM | WEIGHT: 219 LBS | TEMPERATURE: 97.3 F

## 2020-06-30 LAB
BILIRUBIN, POC: NEGATIVE
BLOOD URINE, POC: NORMAL
CLARITY, POC: CLEAR
COLOR, POC: YELLOW
CONTROL: NORMAL
GLUCOSE URINE, POC: NEGATIVE
KETONES, POC: NEGATIVE
LEUKOCYTE EST, POC: NORMAL
NITRITE, POC: NEGATIVE
PH, POC: 6
PREGNANCY TEST URINE, POC: NEGATIVE
PROTEIN, POC: 30
SPECIFIC GRAVITY, POC: >1.03
UROBILINOGEN, POC: 0.2

## 2020-06-30 PROCEDURE — 99214 OFFICE O/P EST MOD 30 MIN: CPT | Performed by: FAMILY MEDICINE

## 2020-06-30 PROCEDURE — 81025 URINE PREGNANCY TEST: CPT | Performed by: FAMILY MEDICINE

## 2020-06-30 PROCEDURE — 81002 URINALYSIS NONAUTO W/O SCOPE: CPT | Performed by: FAMILY MEDICINE

## 2020-06-30 PROCEDURE — 1036F TOBACCO NON-USER: CPT | Performed by: FAMILY MEDICINE

## 2020-06-30 PROCEDURE — G8417 CALC BMI ABV UP PARAM F/U: HCPCS | Performed by: FAMILY MEDICINE

## 2020-06-30 PROCEDURE — G8427 DOCREV CUR MEDS BY ELIG CLIN: HCPCS | Performed by: FAMILY MEDICINE

## 2020-06-30 RX ORDER — SERTRALINE HYDROCHLORIDE 25 MG/1
TABLET, FILM COATED ORAL
Qty: 30 TABLET | Refills: 5 | Status: SHIPPED | OUTPATIENT
Start: 2020-06-30 | End: 2021-02-03

## 2020-06-30 ASSESSMENT — ENCOUNTER SYMPTOMS
SORE THROAT: 0
BLOOD IN STOOL: 0
SINUS PAIN: 0
BACK PAIN: 0
SINUS PRESSURE: 0
SHORTNESS OF BREATH: 0
NAUSEA: 0
CHEST TIGHTNESS: 0
WHEEZING: 0
ABDOMINAL PAIN: 1
RHINORRHEA: 0
COUGH: 0
CONSTIPATION: 0
DIARRHEA: 1
VOMITING: 0

## 2020-06-30 ASSESSMENT — PATIENT HEALTH QUESTIONNAIRE - PHQ9
SUM OF ALL RESPONSES TO PHQ9 QUESTIONS 1 & 2: 0
SUM OF ALL RESPONSES TO PHQ QUESTIONS 1-9: 0
SUM OF ALL RESPONSES TO PHQ QUESTIONS 1-9: 0
2. FEELING DOWN, DEPRESSED OR HOPELESS: 0
1. LITTLE INTEREST OR PLEASURE IN DOING THINGS: 0

## 2020-06-30 NOTE — PROGRESS NOTES
Chief Complaint   Patient presents with    New Patient    Abdominal Pain     Started 4 weeks ago     Amenorrhea    Mass     Left side of abdomin         HPI:  Christina Duque is a 37 y.o. (: 1985) here today to est care and abd pain. Abd pain (constant but waxes and wanes--> 9/10 at it's worst and is stabbing) started about 4 weeks ago. It came on all the sudden. No injury or new exercise. No change in diet or bowel pattern at the time. Denies fevers/chills, CP, SOB, incontinence and increased urgency. She has been having worsening diarrhea about 3 times per day associated with eating. It usually hits her after a meal. Non bloody. States that it looks yellow sometimes. Since then, she was evaluated in urgent care and UA with culture was unremarkable. TVUS ordered and was also normal on 2020. She has been eating a GI bland-paige diet for about 3 weeks. No nausea, GERD or decreased appetite. LMP: 2020  Usually regular and very heavy but this last one was very light and brownish color. Anxiety and depression is stable on zoloft. Would like to update  with cervical cancer screen. Elevated triage BP times 2 without HTN. History of HLD. Review of Systems   Constitutional: Negative for activity change, appetite change, chills, fatigue, fever and unexpected weight change. HENT: Negative for congestion, postnasal drip, rhinorrhea, sinus pressure, sinus pain, sneezing and sore throat. Eyes: Negative for visual disturbance. Respiratory: Negative for cough, chest tightness, shortness of breath and wheezing. Cardiovascular: Negative for chest pain and palpitations. Gastrointestinal: Positive for abdominal pain and diarrhea. Negative for blood in stool, constipation, nausea and vomiting. Endocrine: Negative for cold intolerance, heat intolerance, polydipsia and polyuria.    Genitourinary: Negative for dysuria, frequency, vaginal bleeding and vaginal rate and regular rhythm. Heart sounds: Normal heart sounds. No murmur. Comments: Radial and pedal pulses intact  Pulmonary:      Effort: Pulmonary effort is normal. No respiratory distress. Breath sounds: Normal breath sounds. No wheezing or rales. Chest:      Chest wall: No tenderness. Abdominal:      General: Bowel sounds are normal. There is no distension. Palpations: Abdomen is soft. There is no mass. Tenderness: There is abdominal tenderness. There is no right CVA tenderness, left CVA tenderness, guarding or rebound. Hernia: No hernia is present. Comments: Normal liver and spleen. No organomegaly. TTP over LLL and suprapubic region. Musculoskeletal: Normal range of motion. General: No tenderness. Comments: Intact in all extremities   Lymphadenopathy:      Cervical: No cervical adenopathy. Skin:     General: Skin is warm. Capillary Refill: Capillary refill takes less than 2 seconds. Findings: No erythema or rash. Neurological:      General: No focal deficit present. Mental Status: She is alert and oriented to person, place, and time. Mental status is at baseline. Motor: No weakness or abnormal muscle tone. Coordination: Coordination normal.      Gait: Gait normal.      Deep Tendon Reflexes: Reflexes normal.   Psychiatric:         Mood and Affect: Mood normal.         Behavior: Behavior normal.         Thought Content:  Thought content normal.         Judgment: Judgment normal.         Lab Results   Component Value Date    WBC 7.8 08/04/2016    HGB 15.2 08/04/2016    HCT 44.9 08/04/2016    MCV 88.0 08/04/2016     08/04/2016     Lab Results   Component Value Date     07/23/2015    K 3.8 07/23/2015     07/23/2015    CO2 27 07/23/2015    BUN 11 07/23/2015    CREATININE 0.60 07/23/2015    GLUCOSE 99 07/23/2015    CALCIUM 9.2 07/23/2015    PROT 7.1 09/03/2014    LABALBU 4.5 09/03/2014    BILITOT 0.3 09/03/2014 ALKPHOS 108 09/03/2014    AST 45 (H) 09/03/2014    ALT 79 (H) 09/03/2014    LABGLOM 119 07/23/2015    GFRAA 144 07/23/2015    AGRATIO 1.6 03/30/2013     Lab Results   Component Value Date    CHOL 228 (H) 10/31/2013    CHOL 215 (H) 03/30/2013    CHOL 213 (H) 02/08/2012     Lab Results   Component Value Date    TRIG 178 (H) 10/31/2013    TRIG 154 (H) 03/30/2013    TRIG 191 (H) 02/08/2012     Lab Results   Component Value Date    HDL 56 10/31/2013    HDL 59 03/30/2013    HDL 56 02/08/2012     Lab Results   Component Value Date    LDLCALC 136 (H) 10/31/2013    LDLCALC 125 (H) 03/30/2013    LDLCALC 119 (H) 02/08/2012     Lab Results   Component Value Date    LABVLDL 36 10/31/2013    LABVLDL 31 03/30/2013    LABVLDL 38 02/08/2012     No results found for: LABA1C      ASSESSMENT/PLAN:  1. Lower abdominal pain  UA reveals trace blood and leuks but this is what it showed in urgent care. She states the blood is always there due to being on [facility]. Pregnancy test negative. Follow up CMP  Referral to GI placed per patient request  Ddx: gastroenteritis vs IBD vs kidney stone  - POCT Urinalysis no Micro  - POCT urine pregnancy  - Comprehensive Metabolic Panel; Future  - AFL - Toshia Handy MD, Gastroenterology, [facility]    2. Diarrhea, non bloody   As above  - ARIEL - Toshia Handy MD, Gastroenterology, [facility]    3. Generalized anxiety disorder  Stable, continue to monitor. Continue Zoloft. 4. Major depressive disorder, recurrent, moderate ([address]  As above. - sertraline (ZOLOFT) 25 MG tablet; TAKE 1 TABLET BY MOUTH ONE TIME A DAY  Dispense: 30 tablet; Refill: 5    5. Cervical cancer screening  Referral placed. - Ambulatory referral to Gynecology    6. Mixed hyperlipidemia  Update given BMI    7. Encounter to establish care  VS reviewed   BMI reviewed   All questions answered. F/u discussed. - Comprehensive Metabolic Panel; Future  - Lipid Panel; Future  - Vitamin D 25 Hydroxy;  Future  - CBC Auto

## 2020-07-01 LAB
A/G RATIO: 1.7 (ref 1.1–2.2)
ALBUMIN SERPL-MCNC: 4.3 G/DL (ref 3.4–5)
ALP BLD-CCNC: 93 U/L (ref 40–129)
ALT SERPL-CCNC: 10 U/L (ref 10–40)
ANION GAP SERPL CALCULATED.3IONS-SCNC: 13 MMOL/L (ref 3–16)
AST SERPL-CCNC: 11 U/L (ref 15–37)
BASOPHILS ABSOLUTE: 0.1 K/UL (ref 0–0.2)
BASOPHILS RELATIVE PERCENT: 0.7 %
BILIRUB SERPL-MCNC: <0.2 MG/DL (ref 0–1)
BUN BLDV-MCNC: 9 MG/DL (ref 7–20)
CALCIUM SERPL-MCNC: 9.4 MG/DL (ref 8.3–10.6)
CHLORIDE BLD-SCNC: 99 MMOL/L (ref 99–110)
CHOLESTEROL, TOTAL: 204 MG/DL (ref 0–199)
CO2: 21 MMOL/L (ref 21–32)
CREAT SERPL-MCNC: <0.5 MG/DL (ref 0.6–1.1)
EOSINOPHILS ABSOLUTE: 0.2 K/UL (ref 0–0.6)
EOSINOPHILS RELATIVE PERCENT: 1.7 %
GFR AFRICAN AMERICAN: >60
GFR NON-AFRICAN AMERICAN: >60
GLOBULIN: 2.5 G/DL
GLUCOSE BLD-MCNC: 88 MG/DL (ref 70–99)
HCT VFR BLD CALC: 43.3 % (ref 36–48)
HDLC SERPL-MCNC: 42 MG/DL (ref 40–60)
HEMOGLOBIN: 14.3 G/DL (ref 12–16)
LDL CHOLESTEROL CALCULATED: 104 MG/DL
LYMPHOCYTES ABSOLUTE: 3.3 K/UL (ref 1–5.1)
LYMPHOCYTES RELATIVE PERCENT: 31.8 %
MCH RBC QN AUTO: 28.1 PG (ref 26–34)
MCHC RBC AUTO-ENTMCNC: 33 G/DL (ref 31–36)
MCV RBC AUTO: 85.1 FL (ref 80–100)
MONOCYTES ABSOLUTE: 0.4 K/UL (ref 0–1.3)
MONOCYTES RELATIVE PERCENT: 4.1 %
NEUTROPHILS ABSOLUTE: 6.4 K/UL (ref 1.7–7.7)
NEUTROPHILS RELATIVE PERCENT: 61.7 %
PDW BLD-RTO: 14.1 % (ref 12.4–15.4)
PLATELET # BLD: 477 K/UL (ref 135–450)
PMV BLD AUTO: 8.8 FL (ref 5–10.5)
POTASSIUM SERPL-SCNC: 4.6 MMOL/L (ref 3.5–5.1)
RBC # BLD: 5.09 M/UL (ref 4–5.2)
SODIUM BLD-SCNC: 133 MMOL/L (ref 136–145)
TOTAL PROTEIN: 6.8 G/DL (ref 6.4–8.2)
TRIGL SERPL-MCNC: 292 MG/DL (ref 0–150)
VITAMIN D 25-HYDROXY: 21.7 NG/ML
VLDLC SERPL CALC-MCNC: 58 MG/DL
WBC # BLD: 10.4 K/UL (ref 4–11)

## 2020-07-02 LAB
ESTIMATED AVERAGE GLUCOSE: 108.3 MG/DL
HBA1C MFR BLD: 5.4 %

## 2020-08-03 ENCOUNTER — HOSPITAL ENCOUNTER (OUTPATIENT)
Age: 35
Discharge: HOME | End: 2020-08-03
Payer: COMMERCIAL

## 2020-08-03 LAB
IGA: 80 MG/DL (ref 70–400)
TSH SERPL DL<=0.05 MIU/L-ACNC: 1.7 UIU/ML (ref 0.27–4.2)

## 2020-08-03 PROCEDURE — 84443 ASSAY THYROID STIM HORMONE: CPT

## 2020-08-03 PROCEDURE — 83516 IMMUNOASSAY NONANTIBODY: CPT

## 2020-08-03 PROCEDURE — 82784 ASSAY IGA/IGD/IGG/IGM EACH: CPT

## 2020-08-03 PROCEDURE — 36415 COLL VENOUS BLD VENIPUNCTURE: CPT

## 2020-08-04 LAB — TISSUE TRANSGLUTAMINASE IGA: <0.5 U/ML (ref 0–14)

## 2020-08-05 ENCOUNTER — PATIENT MESSAGE (OUTPATIENT)
Dept: [facility] CLINIC | Age: 35
End: 2020-08-05

## 2020-08-06 RX ORDER — ALBUTEROL SULFATE 90 UG/1
2 AEROSOL, METERED RESPIRATORY (INHALATION) EVERY 6 HOURS PRN
Qty: 1 INHALER | Refills: 0 | Status: SHIPPED | OUTPATIENT
Start: 2020-08-06 | End: 2021-06-17

## 2020-08-06 NOTE — TELEPHONE ENCOUNTER
From: Akil Acuna  To: Sandee Beck MD  Sent: 2020 7:59 PM EDT  Subject: Prescription Question    I wanted to see if you would be able to send in a refill for my ventolin inhaler? Mine . If you can, I use the [facility] on [address]. Thank you!

## 2020-12-22 ENCOUNTER — TELEPHONE (OUTPATIENT)
Dept: [facility] CLINIC | Age: 35
End: 2020-12-22

## 2020-12-22 ENCOUNTER — OFFICE VISIT (OUTPATIENT)
Dept: [facility] CLINIC | Age: 35
End: 2020-12-22
Payer: COMMERCIAL

## 2020-12-22 VITALS
DIASTOLIC BLOOD PRESSURE: 104 MMHG | SYSTOLIC BLOOD PRESSURE: 181 MMHG | WEIGHT: 219.13 LBS | TEMPERATURE: 97.5 F | OXYGEN SATURATION: 98 % | HEART RATE: 85 BPM | BODY MASS INDEX: 30.68 KG/M2 | HEIGHT: 71 IN

## 2020-12-22 PROCEDURE — 1036F TOBACCO NON-USER: CPT | Performed by: FAMILY MEDICINE

## 2020-12-22 PROCEDURE — 99214 OFFICE O/P EST MOD 30 MIN: CPT | Performed by: FAMILY MEDICINE

## 2020-12-22 PROCEDURE — G8427 DOCREV CUR MEDS BY ELIG CLIN: HCPCS | Performed by: FAMILY MEDICINE

## 2020-12-22 PROCEDURE — G8484 FLU IMMUNIZE NO ADMIN: HCPCS | Performed by: FAMILY MEDICINE

## 2020-12-22 PROCEDURE — G8417 CALC BMI ABV UP PARAM F/U: HCPCS | Performed by: FAMILY MEDICINE

## 2020-12-22 RX ORDER — CLINDAMYCIN HYDROCHLORIDE 300 MG/1
300 CAPSULE ORAL 3 TIMES DAILY
Qty: 21 CAPSULE | Refills: 0 | Status: SHIPPED | OUTPATIENT
Start: 2020-12-22 | End: 2020-12-31 | Stop reason: SDUPTHER

## 2020-12-22 RX ORDER — AMLODIPINE BESYLATE 5 MG/1
5 TABLET ORAL DAILY
Qty: 30 TABLET | Refills: 3 | Status: SHIPPED | OUTPATIENT
Start: 2020-12-22 | End: 2021-04-05

## 2020-12-22 ASSESSMENT — ENCOUNTER SYMPTOMS
DIARRHEA: 0
ABDOMINAL PAIN: 0
NAUSEA: 0
COUGH: 0
CONSTIPATION: 0
SHORTNESS OF BREATH: 0
VOMITING: 0

## 2020-12-22 NOTE — Clinical Note
Good afternoon Danny Randall,  Please see the picture of the skin lesion on Mrs. Acuna. Because of her age I did not think a mammogram would be very useful for you. Please let me know if I am mistaken. If you like me to order an ultrasound I would be happy to do so. There is not any significant underlying mass below that that I can palpate but the location and some of the other changes make me inclined to refer her to you for evaluation.   I would appreciate your thoughts, Oksana Acevedo MD

## 2020-12-22 NOTE — PROGRESS NOTES
Chief Complaint   Patient presents with    Breast Problem     red lumb under right nipple. HPI: Antolin Farley presents for evaluation and management of red tender skin lesion underneath her right nipple that she just noticed yesterday. She denies any discharge. States she does not feel any new particular discrete masses in her breast and states that they always feel just a little lumpy. Review of Systems   Constitutional: Negative for chills and fever. Respiratory: Negative for cough and shortness of breath. Cardiovascular: Negative for chest pain and palpitations. Gastrointestinal: Negative for abdominal pain, constipation, diarrhea, nausea and vomiting. Endocrine: Negative for polyuria. Genitourinary: Negative for dysuria. Allergies   Allergen Reactions    Azithromycin Diarrhea    Cortisone Anaphylaxis     fulll blackout for 5 minutes, w/ smelling salts to come to consciousness    Food Anaphylaxis     Red onions    Promethazine Other (See Comments)     Patient states siezures  SEIZURES    Keppra [Levetiracetam] Other (See Comments)     SEIZURES    Penicillins     Sulfa Antibiotics     Adhesive Tape Rash    Lamictal [Lamotrigine] Rash    Oxcarbazepine Rash     New Prescriptions    No medications on file     Current Outpatient Medications   Medication Sig Dispense Refill    albuterol sulfate HFA (VENTOLIN HFA) 108 (90 Base) MCG/ACT inhaler Inhale 2 puffs into the lungs every 6 hours as needed for Wheezing 1 Inhaler 0    sertraline (ZOLOFT) 25 MG tablet TAKE 1 TABLET BY MOUTH ONE TIME A DAY 30 tablet 5    ibuprofen (ADVIL;MOTRIN) 800 MG tablet Take 1 tablet by mouth every 8 hours as needed for Pain or Fever 90 tablet 0    EPINEPHrine (EPIPEN) 0.3 MG/0.3ML SOAJ injection Use as directed for allergic reaction 1 each 1    norgestimate-ethinyl estradiol (SPRINTEC 28) 0.25-35 MG-MCG per tablet Take 1 tablet by mouth daily. No current facility-administered medications for this visit. Past Medical History:   Diagnosis Date    Asthma     Chicken pox     Degenerative disc disease, lumbar     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    Hyperlipidemia     Kidney disease     Meniere's disease     Seizure ([facility] [address]     none since 2013    Trimalleolar fracture of ankle, closed, right, initial encounter 2/27/2018    Viral hepatitis          Objective   BP (!) 181/104   Pulse 85   Temp 97.5 °F (36.4 °C) (Oral)   Ht 5' 11\" (1.803 m)   Wt 219 lb 2 oz (99.4 kg)   LMP 11/24/2020 (Approximate)   SpO2 98%   BMI 30.56 kg/m²   Wt Readings from Last 3 Encounters:   12/22/20 219 lb 2 oz (99.4 kg)   06/30/20 219 lb (99.3 kg)   07/02/19 211 lb (95.7 kg)       Physical Exam  Constitutional:       Appearance: She is well-developed. Cardiovascular:      Rate and Rhythm: Normal rate and regular rhythm. Pulses:           Dorsalis pedis pulses are 2+ on the right side and 2+ on the left side. Posterior tibial pulses are 2+ on the right side and 2+ on the left side. Heart sounds: No murmur. No friction rub. No gallop. Comments: No Lower Extremity Edema  Pulmonary:      Effort: Pulmonary effort is normal.      Breath sounds: Normal breath sounds. No wheezing or rales. Abdominal:      General: Bowel sounds are normal. There is no distension. Palpations: Abdomen is soft. There is no mass. Tenderness: There is no abdominal tenderness. Skin:     General: Skin is warm and dry. Findings: No rash. Comments: 15 mm x 9 mm erythematous papular lesion with telangiectasias.   See photo documentation               Chemistry        Component Value Date/Time     (L) 07/01/2020 1215    K 4.6 07/01/2020 1215    CL 99 07/01/2020 1215    CO2 21 07/01/2020 1215    BUN 9 07/01/2020 1215    CREATININE <0.5 (L) 07/01/2020 1215        Component Value Date/Time    CALCIUM 9.4 07/01/2020 1215 ALKPHOS 93 07/01/2020 1215    AST 11 (L) 07/01/2020 1215    ALT 10 07/01/2020 1215    BILITOT <0.2 07/01/2020 1215          Lab Results   Component Value Date    WBC 10.4 07/01/2020    HGB 14.3 07/01/2020    HCT 43.3 07/01/2020    MCV 85.1 07/01/2020     (H) 07/01/2020     Lab Results   Component Value Date    LABA1C 5.4 07/01/2020     Lab Results   Component Value Date    .3 07/01/2020     Lab Results   Component Value Date    LABA1C 5.4 07/01/2020     No components found for: CHLPL  Lab Results   Component Value Date    TRIG 292 (H) 07/01/2020    TRIG 178 (H) 10/31/2013    TRIG 154 (H) 03/30/2013     Lab Results   Component Value Date    HDL 42 07/01/2020    HDL 56 10/31/2013    HDL 59 03/30/2013     Lab Results   Component Value Date    LDLCALC 104 (H) 07/01/2020    LDLCALC 136 (H) 10/31/2013    LDLCALC 125 (H) 03/30/2013     Lab Results   Component Value Date    LABVLDL 58 07/01/2020    LABVLDL 36 10/31/2013    LABVLDL 31 03/30/2013         Assessment   Plan     1. Skin lesion of breast  We will treat with clindamycin. Asked patient to follow-up in 7 days and we will refer her to Dr. Ariane Castillo for evaluation  - clindamycin (CLEOCIN) 300 MG capsule; Take 1 capsule by mouth 3 times daily for 7 days  Dispense: 21 capsule; Refill: 0  - Jd Graff MD, Breast Surgery, [facility]    2. Essential hypertension  We will treat with Norvasc and follow-up in 1 week    Discussed use, benefit, and side effects of prescribed medications. Barriers to medication compliance addressed. All patient questions answered. Pt voiced understanding.        Health Maintenance   Topic Date Due    Varicella vaccine (1 of 2 - 2-dose childhood series) 03/17/1988    HIV screen  03/17/2002    DTaP/Tdap/Td vaccine (1 - Tdap) 03/17/2006    Cervical cancer screen  09/20/2014    Flu vaccine (1) 09/01/2020    Hepatitis A vaccine  Aged Out    Hepatitis B vaccine  Aged Out    Hib vaccine  Aged Out  Meningococcal (ACWY) vaccine  Aged Out    Pneumococcal 0-64 years Vaccine  Aged Out       RTC 1 week

## 2020-12-22 NOTE — PROGRESS NOTES
Pt reports she noticed a bump yesterday, under her right nipple. Its red, its as big as a dime, its hard, if she touches it, it hurts.

## 2020-12-23 NOTE — TELEPHONE ENCOUNTER
Per Dr. Stephen Siegel, one week follow up for blood pressure check s/p norvasc initiation. This can be with any provider that is available.      Thank you,   Dr. Wright Maxim

## 2020-12-30 PROBLEM — J45.30 MILD PERSISTENT ASTHMA, UNCOMPLICATED: Status: ACTIVE | Noted: 2020-12-30

## 2020-12-31 ENCOUNTER — OFFICE VISIT (OUTPATIENT)
Dept: [facility] CLINIC | Age: 35
End: 2020-12-31
Payer: COMMERCIAL

## 2020-12-31 VITALS
BODY MASS INDEX: 30.96 KG/M2 | TEMPERATURE: 97.2 F | SYSTOLIC BLOOD PRESSURE: 154 MMHG | WEIGHT: 222 LBS | HEART RATE: 107 BPM | DIASTOLIC BLOOD PRESSURE: 86 MMHG

## 2020-12-31 DIAGNOSIS — I10 ESSENTIAL HYPERTENSION: ICD-10-CM

## 2020-12-31 DIAGNOSIS — L98.8 SKIN LESION OF BREAST: Primary | ICD-10-CM

## 2020-12-31 DIAGNOSIS — J45.30 MILD PERSISTENT ASTHMA, UNCOMPLICATED: ICD-10-CM

## 2020-12-31 PROCEDURE — G8427 DOCREV CUR MEDS BY ELIG CLIN: HCPCS | Performed by: FAMILY MEDICINE

## 2020-12-31 PROCEDURE — G8484 FLU IMMUNIZE NO ADMIN: HCPCS | Performed by: FAMILY MEDICINE

## 2020-12-31 PROCEDURE — 99214 OFFICE O/P EST MOD 30 MIN: CPT | Performed by: FAMILY MEDICINE

## 2020-12-31 PROCEDURE — G8417 CALC BMI ABV UP PARAM F/U: HCPCS | Performed by: FAMILY MEDICINE

## 2020-12-31 PROCEDURE — 1036F TOBACCO NON-USER: CPT | Performed by: FAMILY MEDICINE

## 2020-12-31 RX ORDER — CLINDAMYCIN HYDROCHLORIDE 300 MG/1
300 CAPSULE ORAL 3 TIMES DAILY
Qty: 21 CAPSULE | Refills: 0 | Status: SHIPPED | OUTPATIENT
Start: 2020-12-31 | End: 2021-01-07

## 2020-12-31 NOTE — PROGRESS NOTES
Chief Complaint   Patient presents with    Hypertension    Asthma    Other     right breast skin lesion       HPI: Gloria Huddleston presents for evaluation and management of lesion and follow-up on her hypertension as well as asthma. She notes that she has had decreased size and redness of the lesion on her right breast after a week of clindamycin. She reports she is taking her Norvasc and while she initially had some lightheadedness and dizziness this appears to have resolved. She has not been working on her diet yet. She notes that her asthma is well controlled and has not had to use her rescue inhaler in over a month. She states it is typically a seasonal issue      Review of Systems   Constitutional: Negative for chills and fever. Respiratory: Negative for cough and shortness of breath. Cardiovascular: Negative for chest pain and palpitations. Gastrointestinal: Negative for abdominal pain, constipation, diarrhea, nausea and vomiting. Endocrine: Negative for polyuria. Genitourinary: Negative for dysuria.        Allergies   Allergen Reactions    Azithromycin Diarrhea    Cortisone Anaphylaxis     fulll blackout for 5 minutes, w/ smelling salts to come to consciousness    Food Anaphylaxis     Red onions    Promethazine Other (See Comments)     Patient states siezures  SEIZURES    Keppra [Levetiracetam] Other (See Comments)     SEIZURES    Penicillins     Sulfa Antibiotics     Adhesive Tape Rash    Lamictal [Lamotrigine] Rash    Oxcarbazepine Rash     New Prescriptions    No medications on file     Current Outpatient Medications   Medication Sig Dispense Refill    clindamycin (CLEOCIN) 300 MG capsule Take 1 capsule by mouth 3 times daily for 7 days 21 capsule 0    amLODIPine (NORVASC) 5 MG tablet Take 1 tablet by mouth daily 30 tablet 3    albuterol sulfate HFA (VENTOLIN HFA) 108 (90 Base) MCG/ACT inhaler Inhale 2 puffs into the lungs every 6 hours as needed for Wheezing 1 Inhaler 0    sertraline (ZOLOFT) 25 MG tablet TAKE 1 TABLET BY MOUTH ONE TIME A DAY 30 tablet 5    ibuprofen (ADVIL;MOTRIN) 800 MG tablet Take 1 tablet by mouth every 8 hours as needed for Pain or Fever 90 tablet 0    norgestimate-ethinyl estradiol (SPRINTEC 28) 0.25-35 MG-MCG per tablet Take 1 tablet by mouth daily.  EPINEPHrine (EPIPEN) 0.3 MG/0.3ML SOAJ injection Use as directed for allergic reaction (Patient not taking: Reported on 12/31/2020) 1 each 1     No current facility-administered medications for this visit. Past Medical History:   Diagnosis Date    Asthma     Chicken pox     Degenerative disc disease, lumbar     Guillain Barré syndrome ([facility] [address] 8/4/2016    Hyperlipidemia     Kidney disease     Meniere's disease     Seizure ([facility] [address]     none since 2013    Trimalleolar fracture of ankle, closed, right, initial encounter 2/27/2018    Viral hepatitis          Objective   BP (!) 154/86   Pulse 107   Temp 97.2 °F (36.2 °C) (Temporal)   Wt 222 lb (100.7 kg)   LMP 12/23/2020   Breastfeeding No   BMI 30.96 kg/m²   Wt Readings from Last 3 Encounters:   12/31/20 222 lb (100.7 kg)   12/22/20 219 lb 2 oz (99.4 kg)   06/30/20 219 lb (99.3 kg)       Physical Exam  Constitutional:       Appearance: She is well-developed. She is obese. Cardiovascular:      Rate and Rhythm: Normal rate and regular rhythm. Pulses:           Dorsalis pedis pulses are 2+ on the right side and 2+ on the left side. Posterior tibial pulses are 2+ on the right side and 2+ on the left side. Heart sounds: No murmur. No friction rub. No gallop. Comments: No Lower Extremity Edema  Pulmonary:      Effort: Pulmonary effort is normal.      Breath sounds: Normal breath sounds. No wheezing or rales. Chest:      Comments: Decreased erythema and size of papule below right areola. No associated subcutaneous masses appreciated  Abdominal:      General: Bowel sounds are normal. There is no distension.       Palpations: Abdomen is soft. There is no mass. Tenderness: There is no abdominal tenderness. Skin:     General: Skin is warm and dry. Findings: No rash. Chemistry        Component Value Date/Time     (L) 07/01/2020 1215    K 4.6 07/01/2020 1215    CL 99 07/01/2020 1215    CO2 21 07/01/2020 1215    BUN 9 07/01/2020 1215    CREATININE <0.5 (L) 07/01/2020 1215        Component Value Date/Time    CALCIUM 9.4 07/01/2020 1215    ALKPHOS 93 07/01/2020 1215    AST 11 (L) 07/01/2020 1215    ALT 10 07/01/2020 1215    BILITOT <0.2 07/01/2020 1215          Lab Results   Component Value Date    WBC 10.4 07/01/2020    HGB 14.3 07/01/2020    HCT 43.3 07/01/2020    MCV 85.1 07/01/2020     (H) 07/01/2020     Lab Results   Component Value Date    LABA1C 5.4 07/01/2020     Lab Results   Component Value Date    .3 07/01/2020     Lab Results   Component Value Date    LABA1C 5.4 07/01/2020     No components found for: CHLPL  Lab Results   Component Value Date    TRIG 292 (H) 07/01/2020    TRIG 178 (H) 10/31/2013    TRIG 154 (H) 03/30/2013     Lab Results   Component Value Date    HDL 42 07/01/2020    HDL 56 10/31/2013    HDL 59 03/30/2013     Lab Results   Component Value Date    LDLCALC 104 (H) 07/01/2020    LDLCALC 136 (H) 10/31/2013    LDLCALC 125 (H) 03/30/2013     Lab Results   Component Value Date    LABVLDL 58 07/01/2020    LABVLDL 36 10/31/2013    LABVLDL 31 03/30/2013         Assessment   Plan     1. Skin lesion of breast  Appears to be improving. I suspect this is simply a affected hair follicle. We will continue clindamycin for another week and I will route chart to Dr. Raina Wen to see if she still wants the patient to keep follow-up. I will plan to recheck this in 1 month  - clindamycin (CLEOCIN) 300 MG capsule; Take 1 capsule by mouth 3 times daily for 7 days  Dispense: 21 capsule; Refill: 0    2. Mild persistent asthma, uncomplicated  Controlled: Appears stable.   We will continue current management and monitor for adverse reaction and disease progression. Follow-up as noted below      3. Essential hypertension  Uncontrolled: Counseled patient on DASH diet and continue medications recheck in 1 month    Sky Pac received counseling on the following healthy behaviors: nutrition    Patient given educational materials on Nutrition      Discussed use, benefit, and side effects of prescribed medications. Barriers to medication compliance addressed. All patient questions answered. Pt voiced understanding.        Health Maintenance   Topic Date Due    Hepatitis C screen  1985    Varicella vaccine (1 of 2 - 2-dose childhood series) 03/17/1988    Pneumococcal 0-64 years Vaccine (1 of 1 - PPSV23) 03/17/1993    HIV screen  03/17/2002    DTaP/Tdap/Td vaccine (1 - Tdap) 03/17/2006    Cervical cancer screen  09/20/2014    Flu vaccine (1) 09/01/2020    Hepatitis A vaccine  Aged Out    Hepatitis B vaccine  Aged Out    Hib vaccine  Aged Out    Meningococcal (ACWY) vaccine  Aged Out       RTC 1 month

## 2021-01-05 ENCOUNTER — TELEPHONE (OUTPATIENT)
Dept: SURGERY | Age: 36
End: 2021-01-05

## 2021-01-05 NOTE — TELEPHONE ENCOUNTER
Spoke with patient and reviewed new patient intake form. Also confirmed appointment for 1/6/21 at 2:45pm. Patient asked about appointment and I let her know she may be there anytime between 30 minutes to a couple of hours if imaging is completed. Gave her directions to find office. She repeated and voiced understanding.

## 2021-01-06 ENCOUNTER — OFFICE VISIT (OUTPATIENT)
Dept: SURGERY | Age: 36
End: 2021-01-06
Payer: COMMERCIAL

## 2021-01-06 VITALS
HEART RATE: 76 BPM | BODY MASS INDEX: 36.72 KG/M2 | SYSTOLIC BLOOD PRESSURE: 132 MMHG | OXYGEN SATURATION: 98 % | RESPIRATION RATE: 18 BRPM | DIASTOLIC BLOOD PRESSURE: 80 MMHG | TEMPERATURE: 97.6 F | WEIGHT: 220.4 LBS | HEIGHT: 65 IN

## 2021-01-06 DIAGNOSIS — L98.8 LESION OF SKIN OF BREAST: Primary | ICD-10-CM

## 2021-01-06 PROCEDURE — 99204 OFFICE O/P NEW MOD 45 MIN: CPT | Performed by: SURGERY

## 2021-01-06 PROCEDURE — G8484 FLU IMMUNIZE NO ADMIN: HCPCS | Performed by: SURGERY

## 2021-01-06 PROCEDURE — G8427 DOCREV CUR MEDS BY ELIG CLIN: HCPCS | Performed by: SURGERY

## 2021-01-06 PROCEDURE — G8417 CALC BMI ABV UP PARAM F/U: HCPCS | Performed by: SURGERY

## 2021-01-06 ASSESSMENT — ENCOUNTER SYMPTOMS
VOMITING: 0
SHORTNESS OF BREATH: 0
COUGH: 0
NAUSEA: 0
ABDOMINAL PAIN: 0
APNEA: 0
ABDOMINAL DISTENTION: 0
BACK PAIN: 0
WHEEZING: 0

## 2021-01-06 NOTE — LETTER
[facility]  [address] [facility] [address]  Phone: [phone]  Fax: [phone]    Isaac Stein MD        January 10, 2021       Patient: Jericho Wright   MR Number: [id number]   YOB: 1985   Date of Visit: 1/6/2021       Dear Dr. Lauro Sheppard: Thank you for the request for consultation for Jaye Price to me for the evaluation of right breast skin lesion. Below are the relevant portions of my assessment and plan of care. If you have questions, please do not hesitate to call me. I look forward to following Miriam Johnson along with you.     Sincerely,    Misty Jo MD    CC providers:  Demi Ng MD  [address] [place] [address]  [address] [place]

## 2021-01-06 NOTE — PROGRESS NOTES
Gynecologic History  Menarche at age 17    She delivered her first child at age [de-identified], and n/a breastfeed  Premenopausal  No hysterectomy  Oral contraceptive use: yes for 13 years and is not currently taking  Hormone use: no and is not currently taking    Bra Size: 43 F    Review of Systems   Constitutional: Negative for chills, fatigue, fever and unexpected weight change. Eyes: Negative for visual disturbance. Respiratory: Negative for apnea, cough, shortness of breath and wheezing. Cardiovascular: Negative for palpitations and leg swelling. Gastrointestinal: Negative for abdominal distention, abdominal pain, nausea and vomiting. Musculoskeletal: Negative for arthralgias, back pain, gait problem and neck pain. Skin: Negative for rash and wound. Neurological: Negative for syncope, weakness, numbness and headaches. Hematological: Negative for adenopathy. Does not bruise/bleed easily. Psychiatric/Behavioral: Positive for dysphoric mood. Negative for confusion. The patient is nervous/anxious.

## 2021-01-07 NOTE — PROGRESS NOTES
01/10/21    CHIEF COMPLAINT:  Evaluation of right breast skin lesion, possible infection    HISTORY OF PRESENT ILLNESS:  Camilo Munoz is a 37 y.o. woman who is referred by Peggy Pollack MD who requested that I evaluate her for a new right breast skin lesion. The patient had first noticed a painful red bump below her right nipple on 2020. She was evaluated at her primary care physician's office and placed on antibiotics. She reports improvement since that time. She has one more day of antibiotics remaining. She denies drainage from the area. She presents today to have this further evaluated. She states that the redness has largely resolved and the lesion has almost completely resolved with the antibiotics. She denies any similar findings in the contralateral breast.  She denies any masses in either breast.  She denies any abnormal nipple discharge. She has no other systemic complaints and has not had any fevers. She otherwise feels well today.      GYNECOLOGIC HISTORY:  Menarche at age 17    She delivered her first child at age [de-identified], and n/a breastfeed  Premenopausal  No hysterectomy  Oral contraceptive use: yes for 13 years and is not currently taking  Hormone use: no and is not currently taking    Past Medical History:   Diagnosis Date    Asthma     Chicken pox     Degenerative disc disease, lumbar     Guillain Barré syndrome ([facility] [address] 2016    Hyperlipidemia     Kidney disease     Meniere's disease     Seizure ([facility] [address]     none since     Trimalleolar fracture of ankle, closed, right, initial encounter 2018    Viral hepatitis      Past Surgical History:   Procedure Laterality Date    ANKLE FRACTURE SURGERY Right 2018    orif trimalleolar     TONSILLECTOMY AND ADENOIDECTOMY      WISDOM TOOTH EXTRACTION       Current Outpatient Medications   Medication Sig Dispense Refill    amLODIPine (NORVASC) 5 MG tablet Take 1 tablet by mouth daily 30 tablet 3  albuterol sulfate HFA (VENTOLIN HFA) 108 (90 Base) MCG/ACT inhaler Inhale 2 puffs into the lungs every 6 hours as needed for Wheezing 1 Inhaler 0    sertraline (ZOLOFT) 25 MG tablet TAKE 1 TABLET BY MOUTH ONE TIME A DAY 30 tablet 5    ibuprofen (ADVIL;MOTRIN) 800 MG tablet Take 1 tablet by mouth every 8 hours as needed for Pain or Fever 90 tablet 0    EPINEPHrine (EPIPEN) 0.3 MG/0.3ML SOAJ injection Use as directed for allergic reaction 1 each 1    norgestimate-ethinyl estradiol (SPRINTEC 28) 0.25-35 MG-MCG per tablet Take 1 tablet by mouth daily. No current facility-administered medications for this visit. Allergies   Allergen Reactions    Azithromycin Diarrhea    Cortisone Anaphylaxis     fulll blackout for 5 minutes, w/ smelling salts to come to consciousness    Food Anaphylaxis     Red onions    Promethazine Other (See Comments)     Patient states siezures  SEIZURES    Keppra [Levetiracetam] Other (See Comments)     SEIZURES    Penicillins     Sulfa Antibiotics     Adhesive Tape Rash    Lamictal [Lamotrigine] Rash    Oxcarbazepine Rash     Social History     Socioeconomic History    Marital status:      Spouse name: Not on file    Number of children: Not on file    Years of education: Not on file    Highest education level: Not on file   Occupational History    Not on file   Social Needs    Financial resource strain: Not on file    Food insecurity     Worry: Not on file     Inability: Not on file    Transportation needs     Medical: Not on file     Non-medical: Not on file   Tobacco Use    Smoking status: Never Smoker    Smokeless tobacco: Never Used   Substance and Sexual Activity    Alcohol use: Yes     Alcohol/week: 0.0 standard drinks     Comment: rare celebratory    Drug use:  Yes    Sexual activity: Yes     Partners: Male     Birth control/protection: Pill   Lifestyle    Physical activity     Days per week: Not on file     Minutes per session: Not on file Vitals: /80 (Site: Left Upper Arm, Position: Sitting, Cuff Size: Medium Adult)   Pulse 76   Temp 97.6 °F (36.4 °C) (Temporal)   Resp 18   Ht 5' 5\" (1.651 m)   Wt 220 lb 6.4 oz (100 kg)   LMP 12/23/2020   SpO2 98%   BMI 36.68 kg/m²   General: Well-developed, well-nourished, in no apparent distress. Eyes:  Conjunctivae appear normal. Pupils are equal and reactive. Extraocular movements are intact. The sclerae are not injected and show no jaundice. Nose, Mouth and Throat:  Patient is wearing a mask. Neck: Supple, without thyromegaly or adenopathy. Respiratory: Normal respiratory effort, clear to auscultation bilaterally. Cardiovascular: Regular rate and rhythm. No lower extremity edema. Gastrointestinal: Soft, nontender, nondistended, without obvious masses or hernias. Musculoskeletal: Normal gait and range of motion in all 4 extremities. Psychiatric: Alert and oriented x 3. Appropriate affect and behavior for today's visit. Skin: No concerning rashes, lesions, nodules or other skin changes. Lymphatic System:  No concerning cervical, supraclavicular or axillary lymphadenopathy. Breast Exam: The breasts are normal in contour. Bra size 42F. Right Breast: Examination of the right breast in the upright and supine positions reveal no obvious masses, dimpling, or retraction. The nipple and areola are without erosion, edema or ulceration. There is no obvious nipple discharge. There is no concerning axillary adenopathy. There is a small lesion on the right areola that is consistent with a healing infection. There is mild hyperpigmentation but no surrounding erythema. There is no fluctuance or drainage. Left Breast: Examination of the left breast in the upright and supine positions reveal no obvious masses, skin changes, dimpling, or retraction. The nipple and areola are without erosion, edema or ulceration. There is no obvious nipple discharge. There is no concerning axillary adenopathy.

## 2021-01-18 ENCOUNTER — PATIENT MESSAGE (OUTPATIENT)
Dept: [facility] CLINIC | Age: 36
End: 2021-01-18

## 2021-01-18 RX ORDER — NORGESTIMATE AND ETHINYL ESTRADIOL 0.25-0.035
1 KIT ORAL DAILY
Qty: 1 PACKET | Refills: 3 | Status: SHIPPED | OUTPATIENT
Start: 2021-01-18 | End: 2021-04-27

## 2021-01-27 PROBLEM — J45.30 MILD PERSISTENT ASTHMA, UNCOMPLICATED: Status: RESOLVED | Noted: 2020-12-30 | Resolved: 2021-01-27

## 2021-02-03 ENCOUNTER — OFFICE VISIT (OUTPATIENT)
Dept: [facility] CLINIC | Age: 36
End: 2021-02-03
Payer: COMMERCIAL

## 2021-02-03 VITALS
WEIGHT: 220 LBS | BODY MASS INDEX: 36.61 KG/M2 | TEMPERATURE: 97.2 F | SYSTOLIC BLOOD PRESSURE: 154 MMHG | DIASTOLIC BLOOD PRESSURE: 86 MMHG | HEART RATE: 96 BPM

## 2021-02-03 DIAGNOSIS — J45.30 MILD PERSISTENT ASTHMA, UNCOMPLICATED: ICD-10-CM

## 2021-02-03 DIAGNOSIS — F33.1 MAJOR DEPRESSIVE DISORDER, RECURRENT, MODERATE (HCC): ICD-10-CM

## 2021-02-03 DIAGNOSIS — Z23 NEED FOR VACCINATION: ICD-10-CM

## 2021-02-03 DIAGNOSIS — I10 ESSENTIAL HYPERTENSION: Primary | ICD-10-CM

## 2021-02-03 PROCEDURE — 90471 IMMUNIZATION ADMIN: CPT | Performed by: FAMILY MEDICINE

## 2021-02-03 PROCEDURE — G8484 FLU IMMUNIZE NO ADMIN: HCPCS | Performed by: FAMILY MEDICINE

## 2021-02-03 PROCEDURE — 1036F TOBACCO NON-USER: CPT | Performed by: FAMILY MEDICINE

## 2021-02-03 PROCEDURE — G8427 DOCREV CUR MEDS BY ELIG CLIN: HCPCS | Performed by: FAMILY MEDICINE

## 2021-02-03 PROCEDURE — 90732 PPSV23 VACC 2 YRS+ SUBQ/IM: CPT | Performed by: FAMILY MEDICINE

## 2021-02-03 PROCEDURE — G8417 CALC BMI ABV UP PARAM F/U: HCPCS | Performed by: FAMILY MEDICINE

## 2021-02-03 PROCEDURE — 99214 OFFICE O/P EST MOD 30 MIN: CPT | Performed by: FAMILY MEDICINE

## 2021-02-03 RX ORDER — TRIAMTERENE AND HYDROCHLOROTHIAZIDE 37.5; 25 MG/1; MG/1
1 CAPSULE ORAL DAILY
Qty: 30 CAPSULE | Refills: 3 | Status: SHIPPED | OUTPATIENT
Start: 2021-02-03 | End: 2021-06-02

## 2021-02-03 ASSESSMENT — ENCOUNTER SYMPTOMS
NAUSEA: 0
ABDOMINAL PAIN: 0
DIARRHEA: 0
COUGH: 0
SHORTNESS OF BREATH: 0
VOMITING: 0
CONSTIPATION: 0

## 2021-02-03 ASSESSMENT — PATIENT HEALTH QUESTIONNAIRE - PHQ9
1. LITTLE INTEREST OR PLEASURE IN DOING THINGS: 2
6. FEELING BAD ABOUT YOURSELF - OR THAT YOU ARE A FAILURE OR HAVE LET YOURSELF OR YOUR FAMILY DOWN: 3
8. MOVING OR SPEAKING SO SLOWLY THAT OTHER PEOPLE COULD HAVE NOTICED. OR THE OPPOSITE, BEING SO FIGETY OR RESTLESS THAT YOU HAVE BEEN MOVING AROUND A LOT MORE THAN USUAL: 0
9. THOUGHTS THAT YOU WOULD BE BETTER OFF DEAD, OR OF HURTING YOURSELF: 1
7. TROUBLE CONCENTRATING ON THINGS, SUCH AS READING THE NEWSPAPER OR WATCHING TELEVISION: 2

## 2021-02-03 ASSESSMENT — COLUMBIA-SUICIDE SEVERITY RATING SCALE - C-SSRS: 1. WITHIN THE PAST MONTH, HAVE YOU WISHED YOU WERE DEAD OR WISHED YOU COULD GO TO SLEEP AND NOT WAKE UP?: YES

## 2021-02-03 NOTE — PROGRESS NOTES
Chief Complaint   Patient presents with    Hypertension    Asthma    Breast Problem     Lesion       HPI: Rubio Avila presents for evaluation and management of follow-up on hypertension. Rubio Avila notes that she has been trying to follow her DASH diet and has been compliant with her blood pressure medications. She notes no lightheadedness or dizziness. She reports that her mood is worse. She has been feeling dysphoric and not sleeping well and tired. She would like to increase her Zoloft. PHQ-9 Total Score: 16 (2/3/2021 10:53 AM)  Thoughts that you would be better off dead, or of hurting yourself in some way: 1 (2/3/2021 10:53 AM)    She reports that she is going to follow-up with the breast surgeon regarding her skin lesion on her right breast      Review of Systems   Constitutional: Negative for chills and fever. Respiratory: Negative for cough and shortness of breath. Cardiovascular: Negative for chest pain and palpitations. Gastrointestinal: Negative for abdominal pain, constipation, diarrhea, nausea and vomiting. Endocrine: Negative for polyuria. Genitourinary: Negative for dysuria. Psychiatric/Behavioral: Positive for decreased concentration, dysphoric mood, sleep disturbance and suicidal ideas. Negative for self-injury. The patient is nervous/anxious.         Allergies   Allergen Reactions    Azithromycin Diarrhea    Cortisone Anaphylaxis     fulll blackout for 5 minutes, w/ smelling salts to come to consciousness    Food Anaphylaxis     Red onions    Promethazine Other (See Comments)     Patient states siezures  SEIZURES    Keppra [Levetiracetam] Other (See Comments)     SEIZURES    Penicillins     Sulfa Antibiotics     Adhesive Tape Rash    Lamictal [Lamotrigine] Rash    Oxcarbazepine Rash     New Prescriptions    SERTRALINE (ZOLOFT) 50 MG TABLET    Take 1 tablet by mouth daily    TRIAMTERENE-HYDROCHLOROTHIAZIDE (DYAZIDE) 37.5-25 MG PER CAPSULE    Take 1 capsule by mouth daily Current Outpatient Medications   Medication Sig Dispense Refill    sertraline (ZOLOFT) 50 MG tablet Take 1 tablet by mouth daily 30 tablet 5    triamterene-hydroCHLOROthiazide (DYAZIDE) 37.5-25 MG per capsule Take 1 capsule by mouth daily 30 capsule 3    norgestimate-ethinyl estradiol (SPRINTEC 28) 0.25-35 MG-MCG per tablet Take 1 tablet by mouth daily 1 packet 3    amLODIPine (NORVASC) 5 MG tablet Take 1 tablet by mouth daily 30 tablet 3    albuterol sulfate HFA (VENTOLIN HFA) 108 (90 Base) MCG/ACT inhaler Inhale 2 puffs into the lungs every 6 hours as needed for Wheezing 1 Inhaler 0    ibuprofen (ADVIL;MOTRIN) 800 MG tablet Take 1 tablet by mouth every 8 hours as needed for Pain or Fever 90 tablet 0    EPINEPHrine (EPIPEN) 0.3 MG/0.3ML SOAJ injection Use as directed for allergic reaction (Patient not taking: Reported on 2/3/2021) 1 each 1     No current facility-administered medications for this visit. 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/2016    Hyperlipidemia     Kidney disease     Meniere's disease     Seizure ([facility] [address]     none since 2013    Trimalleolar fracture of ankle, closed, right, initial encounter 2/27/2018    Viral hepatitis          Objective   BP (!) 154/86   Pulse 96   Temp 97.2 °F (36.2 °C) (Temporal)   Wt 220 lb (99.8 kg)   LMP 01/20/2021 (Approximate)   Breastfeeding No   BMI 36.61 kg/m²   Wt Readings from Last 3 Encounters:   02/03/21 220 lb (99.8 kg)   01/06/21 220 lb 6.4 oz (100 kg)   12/31/20 222 lb (100.7 kg)       Physical Exam  Constitutional:       Appearance: She is well-developed. Cardiovascular:      Rate and Rhythm: Normal rate and regular rhythm. Heart sounds: No murmur. No friction rub. No gallop. Pulmonary:      Effort: Pulmonary effort is normal.      Breath sounds: Normal breath sounds. No wheezing or rales.    Abdominal:      General: Bowel sounds are normal. There is no distension. Palpations: Abdomen is soft. There is no mass. Tenderness: There is no abdominal tenderness. Skin:     General: Skin is warm and dry. Findings: No rash. Psychiatric:         Mood and Affect: Mood normal.         Behavior: Behavior normal.         Thought Content: Thought content normal.         Judgment: Judgment normal.           Chemistry        Component Value Date/Time     (L) 07/01/2020 1215    K 4.6 07/01/2020 1215    CL 99 07/01/2020 1215    CO2 21 07/01/2020 1215    BUN 9 07/01/2020 1215    CREATININE <0.5 (L) 07/01/2020 1215        Component Value Date/Time    CALCIUM 9.4 07/01/2020 1215    ALKPHOS 93 07/01/2020 1215    AST 11 (L) 07/01/2020 1215    ALT 10 07/01/2020 1215    BILITOT <0.2 07/01/2020 1215          Lab Results   Component Value Date    WBC 10.4 07/01/2020    HGB 14.3 07/01/2020    HCT 43.3 07/01/2020    MCV 85.1 07/01/2020     (H) 07/01/2020     Lab Results   Component Value Date    LABA1C 5.4 07/01/2020     Lab Results   Component Value Date    .3 07/01/2020     Lab Results   Component Value Date    LABA1C 5.4 07/01/2020     No components found for: CHLPL  Lab Results   Component Value Date    TRIG 292 (H) 07/01/2020    TRIG 178 (H) 10/31/2013    TRIG 154 (H) 03/30/2013     Lab Results   Component Value Date    HDL 42 07/01/2020    HDL 56 10/31/2013    HDL 59 03/30/2013     Lab Results   Component Value Date    LDLCALC 104 (H) 07/01/2020    LDLCALC 136 (H) 10/31/2013    LDLCALC 125 (H) 03/30/2013     Lab Results   Component Value Date    LABVLDL 58 07/01/2020    LABVLDL 36 10/31/2013    LABVLDL 31 03/30/2013         Assessment   Plan     1. Essential hypertension  Uncontrolled: We will add Dyazide and follow-up in 1 month  - triamterene-hydroCHLOROthiazide (DYAZIDE) 37.5-25 MG per capsule; Take 1 capsule by mouth daily  Dispense: 30 capsule; Refill: 3    2. Need for vaccination  Vaccinate.   We discussed the impact of her history of Guillain-Barré and I provided her with research. She is willing to move forward with vaccination  - Pneumococcal polysaccharide vaccine 23-valent greater than or equal to 1yo subcutaneous/IM    3. Mild persistent asthma, uncomplicated  As above  - Pneumococcal polysaccharide vaccine 23-valent greater than or equal to 1yo subcutaneous/IM    4. Major depressive disorder, recurrent, moderate (HCC)  We will titrate up her Zoloft and follow-up in 1 month sooner if worsening  - sertraline (ZOLOFT) 50 MG tablet; Take 1 tablet by mouth daily  Dispense: 30 tablet; Refill: 5    Kisha received counseling on the following healthy behaviors: nutrition and exercise    Patient given educational materials on Nutrition and Exercise  Discussed use, benefit, and side effects of prescribed medications. Barriers to medication compliance addressed. All patient questions answered. Pt voiced understanding.        Health Maintenance   Topic Date Due    Hepatitis C screen  1987    Varicella vaccine (1 of 2 - 2-dose childhood series) 03/17/1988    Pneumococcal 0-64 years Vaccine (1 of 1 - PPSV23) 03/17/1993    HIV screen  03/17/2002    DTaP/Tdap/Td vaccine (1 - Tdap) 03/17/2006    Cervical cancer screen  09/20/2014    Flu vaccine (1) 09/01/2020    Potassium monitoring  07/01/2021    Creatinine monitoring  07/01/2021    Hepatitis A vaccine  Aged Out    Hepatitis B vaccine  Aged Out    Hib vaccine  Aged Out    Meningococcal (ACWY) vaccine  Aged Out       RTC 1 month

## 2021-02-03 NOTE — PATIENT INSTRUCTIONS
Naturally slim, as well as Brandneu ($1500)    The GI Diet or \"Primal diet\", Intermittent fasting can also be effective choices. If you have diabetes treated with insulin be sure to ask me for specific guidance around meals. Take your desired weight in pounds and multiply by 10 and that is your average daily calorie allowance. For example if you wish to weigh 170 lb x 10 = 1700 cynthia/day (this is how to gradually lose the weight and maintain your desired weight). Avoid soda/coke and all \"wet carbs\" => Drink ice water instead    Drink a large glass of ice water before meals and EAT SLOWLY (talk while you eat)! Rethink your hunger => it means your losing weight.     Minimize highly processed carbohydrates as they stimulate your appetite:  Specifically cut back on Bread, Rice, Pasta and Potatoes    Avoid eating calories after 6 pm

## 2021-02-17 ENCOUNTER — HOSPITAL ENCOUNTER (OUTPATIENT)
Dept: ULTRASOUND IMAGING | Age: 36
Discharge: HOME | End: 2021-02-17
Payer: COMMERCIAL

## 2021-02-17 ENCOUNTER — OFFICE VISIT (OUTPATIENT)
Dept: SURGERY | Age: 36
End: 2021-02-17
Payer: COMMERCIAL

## 2021-02-17 VITALS
RESPIRATION RATE: 18 BRPM | WEIGHT: 212.8 LBS | OXYGEN SATURATION: 98 % | HEART RATE: 85 BPM | SYSTOLIC BLOOD PRESSURE: 132 MMHG | HEIGHT: 65 IN | TEMPERATURE: 97.2 F | BODY MASS INDEX: 35.45 KG/M2 | DIASTOLIC BLOOD PRESSURE: 78 MMHG

## 2021-02-17 DIAGNOSIS — N63.10 BREAST MASS, RIGHT: Primary | ICD-10-CM

## 2021-02-17 DIAGNOSIS — N63.10 BREAST MASS, RIGHT: ICD-10-CM

## 2021-02-17 DIAGNOSIS — L98.8 LESION OF SKIN OF BREAST: Primary | ICD-10-CM

## 2021-02-17 PROCEDURE — G8417 CALC BMI ABV UP PARAM F/U: HCPCS | Performed by: SURGERY

## 2021-02-17 PROCEDURE — G8484 FLU IMMUNIZE NO ADMIN: HCPCS | Performed by: SURGERY

## 2021-02-17 PROCEDURE — G8427 DOCREV CUR MEDS BY ELIG CLIN: HCPCS | Performed by: SURGERY

## 2021-02-17 PROCEDURE — 1036F TOBACCO NON-USER: CPT | Performed by: SURGERY

## 2021-02-17 PROCEDURE — 76642 ULTRASOUND BREAST LIMITED: CPT

## 2021-02-17 PROCEDURE — 99212 OFFICE O/P EST SF 10 MIN: CPT | Performed by: SURGERY

## 2021-02-17 ASSESSMENT — ENCOUNTER SYMPTOMS
COUGH: 0
ABDOMINAL PAIN: 0
SHORTNESS OF BREATH: 0

## 2021-02-18 NOTE — PROGRESS NOTES
02/17/21    CHIEF COMPLAINT:  Evaluation of right breast skin lesion    HISTORY OF PRESENT ILLNESS:  Debbie Hammond is a 37 y.o. woman who I previously saw for a new right breast areolar skin lesion. She was treated with antibiotics, and her symptoms improved. She scheduled a follow up appointment for today because she did not think the lesion was completely resolving as anticipated. She states it was still red and firm and she wasn't sure if it was infected again. She does report that over the last few days that the area has flattened and become lighter in color, so she was less concerned but wanted to keep her appointment to be certain she didn't need any additional treatment. She denies drainage from the area. She denies any similar findings in the contralateral breast.  She denies any masses in either breast.  She denies any abnormal nipple discharge. She has no other systemic complaints and has not had any fevers. She otherwise feels well today. Please note that her past medical history, surgical history, medications, allergies, social history, and family history were all reviewed with her again today. REVIEW OF SYSTEMS:   Constitutional: Negative for unexpected weight change. Eyes: Negative for visual disturbance. Respiratory: Negative for cough and shortness of breath. Cardiovascular: Negative for chest pain. Gastrointestinal: Negative for abdominal pain. Musculoskeletal: Negative for arthralgias and myalgias. Neurological: Negative for headaches. Hematological: Negative for adenopathy. I personally reviewed and agree with the above ROS as documented by my medical assistant.     PHYSICAL EXAMINATION:   Vitals: /78 (Site: Right Upper Arm, Position: Sitting, Cuff Size: Medium Adult)   Pulse 85   Temp 97.2 °F (36.2 °C) (Temporal)   Resp 18   Ht 5' 5\" (1.651 m)   Wt 212 lb 12.8 oz (96.5 kg)   LMP 01/20/2021 (Approximate)   SpO2 98%   BMI 35.41 kg/m²   General: Well-developed, well-nourished, in no apparent distress. Psychiatric: Alert and oriented x 3. Appropriate affect and behavior for today's visit. Musculoskeletal: Normal gait and range of motion in all 4 extremities. Lymphatic System:  No concerning cervical, supraclavicular or axillary lymphadenopathy. Breast Exam: Bilateral breast examination reveals ptotic breasts bilaterally. Right Breast: Examination of the right breast in the upright and supine positions reveals subtle darkening of the skin overlying the previously infected ruiz gland. There are otherwise no obvious masses, skin changes, dimpling, or retraction. The nipple and areola are without erosion, edema or ulceration. There is no obvious nipple discharge. There is no concerning axillary adenopathy. Left Breast: Examination of the left breast in the upright and supine positions reveal no obvious masses, skin changes, dimpling, or retraction. The nipple and areola are without erosion, edema or ulceration. There is no obvious nipple discharge. There is no concerning axillary adenopathy. IMAGING: I personally reviewed the breast imaging performed from today which I ordered and discussed this with the patient. Ultrasound examination of the right breast at 6:00 2 CFN demonstrates a vague hypoechoic area within the dermis without measurable lesion. There are no underlying breast parenchymal abnormalities identified. She was given a BI-RADS 1. IMPRESSION/RECOMMENDATION:  Meka Cunningham is a 37 y.o. woman who presents today for follow up evaluation of a right breast skin lesion. I reassured her that she is resolving this nicely and there is no need for additional antibiotics. I explained to her that the skin changes may take some time to completely resolve, and she may notice some permanent skin darkening or thickening.   Otherwise, I encouraged her to continue her self-examinations on a monthly basis and to alert her physician of any changes. I also recommended that she begin yearly screening mammograms at the age of 38. She is comfortable with this plan of approach and will contact me in the future if any new questions or concerns arise.      Jay Carmen MD

## 2021-03-03 ENCOUNTER — OFFICE VISIT (OUTPATIENT)
Dept: [facility] CLINIC | Age: 36
End: 2021-03-03
Payer: COMMERCIAL

## 2021-03-03 VITALS
SYSTOLIC BLOOD PRESSURE: 124 MMHG | DIASTOLIC BLOOD PRESSURE: 78 MMHG | HEIGHT: 64 IN | TEMPERATURE: 97.3 F | BODY MASS INDEX: 35.65 KG/M2 | WEIGHT: 208.8 LBS | HEART RATE: 85 BPM

## 2021-03-03 DIAGNOSIS — F33.1 MAJOR DEPRESSIVE DISORDER, RECURRENT, MODERATE (HCC): ICD-10-CM

## 2021-03-03 DIAGNOSIS — E66.01 CLASS 2 SEVERE OBESITY DUE TO EXCESS CALORIES WITH SERIOUS COMORBIDITY AND BODY MASS INDEX (BMI) OF 35.0 TO 35.9 IN ADULT (HCC): ICD-10-CM

## 2021-03-03 DIAGNOSIS — I10 ESSENTIAL HYPERTENSION: ICD-10-CM

## 2021-03-03 DIAGNOSIS — I10 ESSENTIAL HYPERTENSION: Primary | ICD-10-CM

## 2021-03-03 PROCEDURE — G8484 FLU IMMUNIZE NO ADMIN: HCPCS | Performed by: FAMILY MEDICINE

## 2021-03-03 PROCEDURE — 99214 OFFICE O/P EST MOD 30 MIN: CPT | Performed by: FAMILY MEDICINE

## 2021-03-03 PROCEDURE — 96127 BRIEF EMOTIONAL/BEHAV ASSMT: CPT | Performed by: FAMILY MEDICINE

## 2021-03-03 PROCEDURE — 1036F TOBACCO NON-USER: CPT | Performed by: FAMILY MEDICINE

## 2021-03-03 PROCEDURE — G8417 CALC BMI ABV UP PARAM F/U: HCPCS | Performed by: FAMILY MEDICINE

## 2021-03-03 PROCEDURE — G8427 DOCREV CUR MEDS BY ELIG CLIN: HCPCS | Performed by: FAMILY MEDICINE

## 2021-03-03 ASSESSMENT — ENCOUNTER SYMPTOMS
ABDOMINAL PAIN: 0
DIARRHEA: 0
COUGH: 0
SHORTNESS OF BREATH: 0
NAUSEA: 0
CONSTIPATION: 0
VOMITING: 0

## 2021-03-03 ASSESSMENT — PATIENT HEALTH QUESTIONNAIRE - PHQ9
SUM OF ALL RESPONSES TO PHQ QUESTIONS 1-9: 0
SUM OF ALL RESPONSES TO PHQ QUESTIONS 1-9: 0
SUM OF ALL RESPONSES TO PHQ9 QUESTIONS 1 & 2: 0

## 2021-03-03 NOTE — PROGRESS NOTES
Chief Complaint   Patient presents with    Hypertension    Depression       HPI: Cassie Gay presents for evaluation and management of hypertension, depression and obesity. Cassie Gay notes that she has been exercising every day by doing 30 to 40 minutes on the elliptical and some light weight resistance training. She reports her mood is good on the Zoloft. PHQ-9 Total Score: 0 (3/3/2021 10:24 AM)    She notes that she is taking and tolerating her amlodipine and Dyazide for her blood pressure. Denies lightheadedness or dizziness but does note she is having a great deal of urinary frequency. Review of Systems   Constitutional: Negative for chills and fever. Respiratory: Negative for cough and shortness of breath. Cardiovascular: Negative for chest pain and palpitations. Gastrointestinal: Negative for abdominal pain, constipation, diarrhea, nausea and vomiting. Endocrine: Negative for polyuria. Genitourinary: Negative for dysuria.        Allergies   Allergen Reactions    Azithromycin Diarrhea    Cortisone Anaphylaxis     fulll blackout for 5 minutes, w/ smelling salts to come to consciousness    Food Anaphylaxis     Red onions    Promethazine Other (See Comments)     Patient states siezures  SEIZURES    Keppra [Levetiracetam] Other (See Comments)     SEIZURES    Penicillins     Sulfa Antibiotics     Adhesive Tape Rash    Lamictal [Lamotrigine] Rash    Oxcarbazepine Rash     New Prescriptions    No medications on file     Current Outpatient Medications   Medication Sig Dispense Refill    sertraline (ZOLOFT) 50 MG tablet Take 1 tablet by mouth daily 30 tablet 5    triamterene-hydroCHLOROthiazide (DYAZIDE) 37.5-25 MG per capsule Take 1 capsule by mouth daily 30 capsule 3    norgestimate-ethinyl estradiol (SPRINTEC 28) 0.25-35 MG-MCG per tablet Take 1 tablet by mouth daily 1 packet 3    amLODIPine (NORVASC) 5 MG tablet Take 1 tablet by mouth daily 30 tablet 3    albuterol sulfate HFA (VENTOLIN HFA) 108 (90 Base) MCG/ACT inhaler Inhale 2 puffs into the lungs every 6 hours as needed for Wheezing 1 Inhaler 0    ibuprofen (ADVIL;MOTRIN) 800 MG tablet Take 1 tablet by mouth every 8 hours as needed for Pain or Fever (Patient not taking: Reported on 3/3/2021) 90 tablet 0    EPINEPHrine (EPIPEN) 0.3 MG/0.3ML SOAJ injection Use as directed for allergic reaction (Patient not taking: Reported on 2/3/2021) 1 each 1     No current facility-administered medications for this visit. Past Medical History:   Diagnosis Date    Asthma     Chicken pox     Degenerative disc disease, lumbar     Guillain Barré syndrome ([facility] [address] 8/4/2016    Hyperlipidemia     Kidney disease     Meniere's disease     Seizure ([facility] [address]     none since 2013    Trimalleolar fracture of ankle, closed, right, initial encounter 2/27/2018    Viral hepatitis          Objective   /78   Pulse 85   Temp 97.3 °F (36.3 °C) (Temporal)   Ht 5' 4.3\" (1.633 m)   Wt 208 lb 12.8 oz (94.7 kg)   LMP 02/17/2021 (Approximate)   Breastfeeding No   BMI 35.51 kg/m²   Wt Readings from Last 3 Encounters:   03/03/21 208 lb 12.8 oz (94.7 kg)   02/17/21 212 lb 12.8 oz (96.5 kg)   02/03/21 220 lb (99.8 kg)       Physical Exam  Constitutional:       Appearance: She is well-developed. She is obese. Cardiovascular:      Rate and Rhythm: Normal rate and regular rhythm. Heart sounds: No murmur. No friction rub. No gallop. Pulmonary:      Effort: Pulmonary effort is normal.      Breath sounds: Normal breath sounds. No wheezing or rales. Abdominal:      General: Bowel sounds are normal. There is no distension. Palpations: Abdomen is soft. There is no mass. Tenderness: There is no abdominal tenderness. Skin:     General: Skin is warm and dry. Findings: No rash.            Chemistry        Component Value Date/Time     (L) 07/01/2020 1215    K 4.6 07/01/2020 1215    CL 99 07/01/2020 1215    CO2 21 07/01/2020 1215    BUN 9 07/01/2020 1215    CREATININE <0.5 (L) 07/01/2020 1215        Component Value Date/Time    CALCIUM 9.4 07/01/2020 1215    ALKPHOS 93 07/01/2020 1215    AST 11 (L) 07/01/2020 1215    ALT 10 07/01/2020 1215    BILITOT <0.2 07/01/2020 1215          Lab Results   Component Value Date    WBC 10.4 07/01/2020    HGB 14.3 07/01/2020    HCT 43.3 07/01/2020    MCV 85.1 07/01/2020     (H) 07/01/2020     Lab Results   Component Value Date    LABA1C 5.4 07/01/2020     Lab Results   Component Value Date    .3 07/01/2020     Lab Results   Component Value Date    LABA1C 5.4 07/01/2020     No components found for: CHLPL  Lab Results   Component Value Date    TRIG 292 (H) 07/01/2020    TRIG 178 (H) 10/31/2013    TRIG 154 (H) 03/30/2013     Lab Results   Component Value Date    HDL 42 07/01/2020    HDL 56 10/31/2013    HDL 59 03/30/2013     Lab Results   Component Value Date    LDLCALC 104 (H) 07/01/2020    LDLCALC 136 (H) 10/31/2013    LDLCALC 125 (H) 03/30/2013     Lab Results   Component Value Date    LABVLDL 58 07/01/2020    LABVLDL 36 10/31/2013    LABVLDL 31 03/30/2013         Assessment   Plan     1. Essential hypertension  Controlled: Appears stable. We will continue current management and monitor for adverse reaction and disease progression. Follow-up as noted below    - Comprehensive Metabolic Panel; Future    2. Major depressive disorder, recurrent, moderate (HCC)  Controlled: Appears stable. We will continue current management and monitor for adverse reaction and disease progression. Follow-up as noted below    - [place] BEHAV ASSMT W/SCORE & DOCD/STAND INSTRUMENT    3. Class 2 severe obesity due to excess calories with serious comorbidity and body mass index (BMI) of 35.0 to 35.9 in [facility])  Making progress. Encourage patient to continue to work on diet and exercise and follow-up in 6 months  Discussed use, benefit, and side effects of prescribed medications. Barriers to medication compliance addressed.

## 2021-03-04 LAB
A/G RATIO: 1.6 (ref 1.1–2.2)
ALBUMIN SERPL-MCNC: 4.6 G/DL (ref 3.4–5)
ALP BLD-CCNC: 85 U/L (ref 40–129)
ALT SERPL-CCNC: 10 U/L (ref 10–40)
ANION GAP SERPL CALCULATED.3IONS-SCNC: 14 MMOL/L (ref 3–16)
AST SERPL-CCNC: 13 U/L (ref 15–37)
BILIRUB SERPL-MCNC: <0.2 MG/DL (ref 0–1)
BUN BLDV-MCNC: 8 MG/DL (ref 7–20)
CALCIUM SERPL-MCNC: 10.1 MG/DL (ref 8.3–10.6)
CHLORIDE BLD-SCNC: 97 MMOL/L (ref 99–110)
CO2: 25 MMOL/L (ref 21–32)
CREAT SERPL-MCNC: 0.6 MG/DL (ref 0.6–1.1)
GFR AFRICAN AMERICAN: >60
GFR NON-AFRICAN AMERICAN: >60
GLOBULIN: 2.9 G/DL
GLUCOSE BLD-MCNC: 99 MG/DL (ref 70–99)
POTASSIUM SERPL-SCNC: 3.7 MMOL/L (ref 3.5–5.1)
SODIUM BLD-SCNC: 136 MMOL/L (ref 136–145)
TOTAL PROTEIN: 7.5 G/DL (ref 6.4–8.2)

## 2021-04-03 DIAGNOSIS — I10 ESSENTIAL HYPERTENSION: ICD-10-CM

## 2021-04-05 RX ORDER — AMLODIPINE BESYLATE 5 MG/1
TABLET ORAL
Qty: 30 TABLET | Refills: 0 | Status: SHIPPED | OUTPATIENT
Start: 2021-04-05 | End: 2021-05-03

## 2021-04-11 ENCOUNTER — APPOINTMENT (OUTPATIENT)
Dept: CT IMAGING | Age: 36
End: 2021-04-11
Payer: COMMERCIAL

## 2021-04-11 ENCOUNTER — HOSPITAL ENCOUNTER (EMERGENCY)
Age: 36
Discharge: HOME | End: 2021-04-11
Attending: EMERGENCY MEDICINE
Payer: COMMERCIAL

## 2021-04-11 VITALS
TEMPERATURE: 98.8 F | OXYGEN SATURATION: 99 % | RESPIRATION RATE: 18 BRPM | DIASTOLIC BLOOD PRESSURE: 73 MMHG | HEART RATE: 67 BPM | SYSTOLIC BLOOD PRESSURE: 123 MMHG

## 2021-04-11 DIAGNOSIS — M54.32 SCIATICA OF LEFT SIDE: Primary | ICD-10-CM

## 2021-04-11 LAB — HCG(URINE) PREGNANCY TEST: NEGATIVE

## 2021-04-11 PROCEDURE — 6370000000 HC RX 637 (ALT 250 FOR IP): Performed by: EMERGENCY MEDICINE

## 2021-04-11 PROCEDURE — 72128 CT CHEST SPINE W/O DYE: CPT

## 2021-04-11 PROCEDURE — 96375 TX/PRO/DX INJ NEW DRUG ADDON: CPT

## 2021-04-11 PROCEDURE — 84703 CHORIONIC GONADOTROPIN ASSAY: CPT

## 2021-04-11 PROCEDURE — 96376 TX/PRO/DX INJ SAME DRUG ADON: CPT

## 2021-04-11 PROCEDURE — 72131 CT LUMBAR SPINE W/O DYE: CPT

## 2021-04-11 PROCEDURE — 96374 THER/PROPH/DIAG INJ IV PUSH: CPT

## 2021-04-11 PROCEDURE — 99284 EMERGENCY DEPT VISIT MOD MDM: CPT

## 2021-04-11 PROCEDURE — 6360000002 HC RX W HCPCS: Performed by: EMERGENCY MEDICINE

## 2021-04-11 PROCEDURE — 72125 CT NECK SPINE W/O DYE: CPT

## 2021-04-11 RX ORDER — DIAZEPAM 5 MG/1
5 TABLET ORAL ONCE
Status: COMPLETED | OUTPATIENT
Start: 2021-04-11 | End: 2021-04-11

## 2021-04-11 RX ORDER — LIDOCAINE 4 G/G
1 PATCH TOPICAL ONCE
Status: DISCONTINUED | OUTPATIENT
Start: 2021-04-11 | End: 2021-04-11 | Stop reason: HOSPADM

## 2021-04-11 RX ORDER — DIAZEPAM 5 MG/1
5 TABLET ORAL EVERY 8 HOURS PRN
Qty: 21 TABLET | Refills: 0 | Status: SHIPPED | OUTPATIENT
Start: 2021-04-11 | End: 2021-04-18

## 2021-04-11 RX ORDER — ONDANSETRON 2 MG/ML
4 INJECTION INTRAMUSCULAR; INTRAVENOUS ONCE
Status: COMPLETED | OUTPATIENT
Start: 2021-04-11 | End: 2021-04-11

## 2021-04-11 RX ORDER — METHYLPREDNISOLONE 4 MG/1
TABLET ORAL
Qty: 1 KIT | Refills: 0 | Status: SHIPPED | OUTPATIENT
Start: 2021-04-11 | End: 2021-04-17

## 2021-04-11 RX ORDER — KETOROLAC TROMETHAMINE 30 MG/ML
30 INJECTION, SOLUTION INTRAMUSCULAR; INTRAVENOUS ONCE
Status: COMPLETED | OUTPATIENT
Start: 2021-04-11 | End: 2021-04-11

## 2021-04-11 RX ORDER — MORPHINE SULFATE 4 MG/ML
4 INJECTION, SOLUTION INTRAMUSCULAR; INTRAVENOUS ONCE
Status: COMPLETED | OUTPATIENT
Start: 2021-04-11 | End: 2021-04-11

## 2021-04-11 RX ORDER — PREDNISONE 20 MG/1
40 TABLET ORAL ONCE
Status: COMPLETED | OUTPATIENT
Start: 2021-04-11 | End: 2021-04-11

## 2021-04-11 RX ORDER — MORPHINE SULFATE 2 MG/ML
2 INJECTION, SOLUTION INTRAMUSCULAR; INTRAVENOUS ONCE
Status: COMPLETED | OUTPATIENT
Start: 2021-04-11 | End: 2021-04-11

## 2021-04-11 RX ADMIN — PREDNISONE 40 MG: 20 TABLET ORAL at 13:35

## 2021-04-11 RX ADMIN — ONDANSETRON 4 MG: 2 INJECTION INTRAMUSCULAR; INTRAVENOUS at 13:49

## 2021-04-11 RX ADMIN — DIAZEPAM 5 MG: 5 TABLET ORAL at 13:35

## 2021-04-11 RX ADMIN — KETOROLAC TROMETHAMINE 30 MG: 30 INJECTION, SOLUTION INTRAMUSCULAR; INTRAVENOUS at 13:49

## 2021-04-11 RX ADMIN — MORPHINE SULFATE 2 MG: 2 INJECTION, SOLUTION INTRAMUSCULAR; INTRAVENOUS at 12:33

## 2021-04-11 RX ADMIN — MORPHINE SULFATE 4 MG: 4 INJECTION, SOLUTION INTRAMUSCULAR; INTRAVENOUS at 13:49

## 2021-04-11 ASSESSMENT — PAIN SCALES - GENERAL
PAINLEVEL_OUTOF10: 4
PAINLEVEL_OUTOF10: 5

## 2021-04-11 ASSESSMENT — PAIN DESCRIPTION - PAIN TYPE: TYPE: ACUTE PAIN

## 2021-04-11 ASSESSMENT — PAIN DESCRIPTION - ORIENTATION: ORIENTATION: LEFT

## 2021-04-11 ASSESSMENT — PAIN DESCRIPTION - LOCATION: LOCATION: LEG;NECK;SHOULDER
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
## 2021-04-11 NOTE — ED NOTES
Patient prepared for and ready to be discharged. Patient discharged at this time in no acute distress after verbalizing understanding of discharge instructions. Patient left after receiving After Visit Summary instructions.       Junior Biswas RN  04/11/21 0331
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
## 2021-04-27 RX ORDER — NORGESTIMATE AND ETHINYL ESTRADIOL 0.25-0.035
KIT ORAL
Qty: 28 TABLET | Refills: 0 | Status: SHIPPED | OUTPATIENT
Start: 2021-04-27 | End: 2021-06-08

## 2021-04-27 NOTE — TELEPHONE ENCOUNTER
Last appt: 3/3/2021  Next appt: 9/3/2021  Due to return:            norgestimate-ethinyl estradiol ([address] [address]) 0.25-35 MG-MCG per tablet    Take one tablet by mouth daily            [facility], [address], [address] [place] [address]   Phone:  [phone]  Fax:  [phone]

## 2021-05-02 DIAGNOSIS — I10 ESSENTIAL HYPERTENSION: ICD-10-CM

## 2021-05-03 RX ORDER — AMLODIPINE BESYLATE 5 MG/1
TABLET ORAL
Qty: 30 TABLET | Refills: 0 | Status: SHIPPED | OUTPATIENT
Start: 2021-05-03 | End: 2021-06-17

## 2021-06-01 DIAGNOSIS — I10 ESSENTIAL HYPERTENSION: ICD-10-CM

## 2021-06-02 RX ORDER — TRIAMTERENE AND HYDROCHLOROTHIAZIDE 37.5; 25 MG/1; MG/1
CAPSULE ORAL
Qty: 90 CAPSULE | Refills: 2 | Status: SHIPPED | OUTPATIENT
Start: 2021-06-02 | End: 2022-02-25

## 2021-06-08 RX ORDER — NORGESTIMATE AND ETHINYL ESTRADIOL 0.25-0.035
KIT ORAL
Qty: 28 TABLET | Refills: 0 | Status: SHIPPED | OUTPATIENT
Start: 2021-06-08 | End: 2021-07-06

## 2021-06-16 DIAGNOSIS — I10 ESSENTIAL HYPERTENSION: ICD-10-CM

## 2021-06-17 RX ORDER — AMLODIPINE BESYLATE 5 MG/1
TABLET ORAL
Qty: 30 TABLET | Refills: 0 | Status: SHIPPED | OUTPATIENT
Start: 2021-06-17 | End: 2021-07-23

## 2021-06-17 RX ORDER — ALBUTEROL SULFATE 90 UG/1
AEROSOL, METERED RESPIRATORY (INHALATION)
Qty: 8.5 G | Refills: 0 | Status: SHIPPED | OUTPATIENT
Start: 2021-06-17

## 2021-07-04 NOTE — TELEPHONE ENCOUNTER
Received PA request for lyrica. Do not have updated insurance info. Sent [facility] message to respond to me with insurance information. Yes

## 2021-07-06 RX ORDER — NORGESTIMATE AND ETHINYL ESTRADIOL 0.25-0.035
KIT ORAL
Qty: 28 TABLET | Refills: 0 | Status: SHIPPED | OUTPATIENT
Start: 2021-07-06 | End: 2021-08-05

## 2021-07-06 NOTE — TELEPHONE ENCOUNTER
Medication:   Requested Prescriptions     Pending Prescriptions Disp Refills    norgestimate-ethinyl estradiol (ORTHO-CYCLEN) 0.25-35 MG-MCG per tablet [Pharmacy Med Name: [facility] Estradiol Oral Tablet 0.25-35 MG-MCG] 28 tablet 0     Sig: TAKE 1 TABLET BY MOUTH EVERY DAY        Last Filled:  06/08/21    Patient Phone Number: [phone] (home)     Last appt: 3/3/2021 Return in about 6 months (around 9/3/2021). Next appt: 9/3/2021    Last OARRS:   RX Monitoring 10/9/2017   Attestation The Prescription Monitoring Report for this patient was reviewed today. Periodic Controlled Substance Monitoring No signs of potential drug abuse or diversion identified.      Last PDMP Driss Jasso as Reviewed:   Review User Review Instant Review Result

## 2021-07-22 DIAGNOSIS — I10 ESSENTIAL HYPERTENSION: ICD-10-CM

## 2021-07-23 RX ORDER — AMLODIPINE BESYLATE 5 MG/1
TABLET ORAL
Qty: 30 TABLET | Refills: 0 | Status: SHIPPED | OUTPATIENT
Start: 2021-07-23 | End: 2021-08-18

## 2021-07-23 NOTE — TELEPHONE ENCOUNTER
Medication:   Requested Prescriptions     Pending Prescriptions Disp Refills    amLODIPine (NORVASC) 5 MG tablet [Pharmacy Med Name: amLODIPine Besylate Oral Tablet 5 MG] 30 tablet 0     Sig: TAKE 1 TABLET BY MOUTH EVERY DAY        Last Filled:  06/17/2021    Patient Phone Number: [phone] (home)     Last appt: 3/3/2021 Return in about 6 months (around 9/3/2021). Next appt: 9/3/2021    Last OARRS:   RX Monitoring 10/9/2017   Attestation The Prescription Monitoring Report for this patient was reviewed today. Periodic Controlled Substance Monitoring No signs of potential drug abuse or diversion identified.

## 2021-07-29 ENCOUNTER — HOSPITAL ENCOUNTER (EMERGENCY)
Age: 36
Discharge: HOME | End: 2021-07-29
Attending: EMERGENCY MEDICINE
Payer: COMMERCIAL

## 2021-07-29 ENCOUNTER — APPOINTMENT (OUTPATIENT)
Dept: CT IMAGING | Age: 36
End: 2021-07-29
Payer: COMMERCIAL

## 2021-07-29 ENCOUNTER — NURSE TRIAGE (OUTPATIENT)
Dept: OTHER | Facility: CLINIC | Age: 36
End: 2021-07-29

## 2021-07-29 VITALS
DIASTOLIC BLOOD PRESSURE: 97 MMHG | HEIGHT: 65 IN | RESPIRATION RATE: 16 BRPM | OXYGEN SATURATION: 100 % | BODY MASS INDEX: 31.32 KG/M2 | SYSTOLIC BLOOD PRESSURE: 173 MMHG | HEART RATE: 79 BPM | WEIGHT: 188 LBS | TEMPERATURE: 98.8 F

## 2021-07-29 DIAGNOSIS — S03.40XA TMJ (SPRAIN OF TEMPOROMANDIBULAR JOINT), INITIAL ENCOUNTER: Primary | ICD-10-CM

## 2021-07-29 PROCEDURE — 96372 THER/PROPH/DIAG INJ SC/IM: CPT

## 2021-07-29 PROCEDURE — 70486 CT MAXILLOFACIAL W/O DYE: CPT

## 2021-07-29 PROCEDURE — 6360000002 HC RX W HCPCS: Performed by: EMERGENCY MEDICINE

## 2021-07-29 PROCEDURE — 99283 EMERGENCY DEPT VISIT LOW MDM: CPT

## 2021-07-29 RX ORDER — KETOROLAC TROMETHAMINE 30 MG/ML
15 INJECTION, SOLUTION INTRAMUSCULAR; INTRAVENOUS ONCE
Status: COMPLETED | OUTPATIENT
Start: 2021-07-29 | End: 2021-07-29

## 2021-07-29 RX ADMIN — KETOROLAC TROMETHAMINE 15 MG: 30 INJECTION, SOLUTION INTRAMUSCULAR; INTRAVENOUS at 13:04

## 2021-07-29 ASSESSMENT — PAIN SCALES - GENERAL
PAINLEVEL_OUTOF10: 4

## 2021-07-29 ASSESSMENT — PAIN DESCRIPTION - LOCATION: LOCATION: JAW

## 2021-07-29 ASSESSMENT — PAIN DESCRIPTION - PAIN TYPE
TYPE: ACUTE PAIN
TYPE: ACUTE PAIN

## 2021-07-29 ASSESSMENT — PAIN DESCRIPTION - PROGRESSION: CLINICAL_PROGRESSION: NOT CHANGED

## 2021-07-29 ASSESSMENT — PAIN DESCRIPTION - ORIENTATION: ORIENTATION: LEFT

## 2021-07-29 ASSESSMENT — PAIN - FUNCTIONAL ASSESSMENT: PAIN_FUNCTIONAL_ASSESSMENT: 0-10

## 2021-07-29 NOTE — TELEPHONE ENCOUNTER
Received call from chery at [facility]- [facility] with Red Flag Complaint. Brief description of triage: dislocated jaw     Triage indicates for patient to go to ED now advised if worsens she can call back if anything becomes severe to call 911 in route to assist she is leaving now to go to ED. Care advice provided, patient verbalizes understanding; denies any other questions or concerns; instructed to call back for any new or worsening symptoms. Attention Provider: Thank you for allowing me to participate in the care of your patient. The patient was connected to triage in response to information provided to the [facility]. Please do not respond through this encounter as the response is not directed to a shared pool. Reason for Disposition   Can't close the mouth fully (i.e., \"mouth is locked open\", patient will have difficulty talking)    Answer Assessment - Initial Assessment Questions  1. ONSET: \"When did the pain start? \" (e.g., minutes, hours, days)      Yesterday with tension and pain     2. ONSET: \"Does the pain come and go, or has it been constant since it started? \" (e.g., constant, intermittent, fleeting)      Yesterday not sure if it is TMJ but not able to close jaw all the way the pain is constant and it gets worse when she try's to move her jaw up where the jaw line connects and toward ear      3. SEVERITY: \"How bad is the pain? \"   (Scale 1-10; mild, moderate or severe)    - MILD (1-3): doesn't interfere with normal activities     - MODERATE (4-7): interferes with normal activities or awakens from sleep     - SEVERE (8-10): excruciating pain, unable to do any normal activities       Yes it is a 4 out of 10 not but does worsened dependant on movent     4. LOCATION: \"Where does it hurt? \"       Left side jaw     5. RASH: \"Is there any redness, rash, or swelling of the face? \"      Slight swelling not really     6. FEVER: \"Do you have a fever? \" If so, ask: \"What is it, how was it measured, and when did it start? \"       No    7. OTHER SYMPTOMS: \"Do you have any other symptoms? \" (e.g., fever, toothache, nasal discharge, nasal congestion, clicking sensation in jaw joint)      Cant chew or do anything she heard clicking yesterday when she tried to chew anything cant hear now thought, no to all others , has normal allergies     8. PREGNANCY: \"Is there any chance you are pregnant? \" \"When was your last menstrual period? \"      Not believe so LMP was noted on she will start next week so three weeks ago    Protocols used: FACE PAIN-ADULT-[place]

## 2021-07-29 NOTE — ED PROVIDER NOTES
[address] ATTENDING NOTE       Date of evaluation: 7/29/2021    Chief Complaint     Jaw Pain (left sided jaw pain states she cant chew or close her mouth all the way, unable to drink through a straw, denies any injury)      History of Present Illness     Kalin Galicia is a [address] y.o. female who presents to department with jaw pain. Patient states over the last 2 days she has been having increasing pain in her left TMJ and now she is having difficulty opening and closing her jaw. She called her primary care earlier today who recommended she come to the emergency department to be evaluated for jaw dislocation. The patient denies any trauma to the jaw. She states that the pain is mostly located in her left TMJ and is currently 4/10 severity. Pain is worse with movement and improves with rest.  She denies any change in voice or difficulty swallowing. She denies any headache, changes in vision, taste or smell. She denies any shortness of breath or chest pain. No abdominal pain nausea or vomiting. Review of Systems     Review of Systems  Positive for left-sided jaw pain  Negative for fever, cough, chest pain, sore throat, changes in voice, changes in smell/taste/vision, abdominal pain, nausea, vomiting, changes in bladder or bowel function, vaginal complaints  See HPI. A complete review of systems wasconducted and is otherwise negative unless noted above. Past Medical, Surgical, Family, and Social History     She has a past medical history of Asthma, Chicken pox, Degenerative disc disease, lumbar, Guillain Barré syndrome ([address] Hyperlipidemia, Kidney disease, Meniere's disease, Seizure ([address] Trimalleolar fracture of ankle, closed, right, initial encounter, and Viral hepatitis. She has a past surgical history that includes Tonsillectomy and adenoidectomy (1994); [place] tooth extraction; and Ankle fracture surgery (Right, 03/02/2018).   Her family history includes Arthritis in her mother; Cancer in her paternal grandfather; Depression in her mother and sister; Diabetes in her paternal grandfather and paternal grandmother; Heart Defect in her brother; High Cholesterol in her father, paternal grandfather, and paternal grandmother; Mult Sclerosis in her paternal aunt; Schizophrenia in her sister. She reports that she has never smoked. She has never used smokeless tobacco. She reports current alcohol use. She reports current drug use. Medications     Previous Medications    ALBUTEROL SULFATE  (90 BASE) MCG/ACT INHALER    INHALE 2 PUFFS BY MOUTH EVERY SIX HOURS AS NEEDED FOR WHEEZING    AMLODIPINE (NORVASC) 5 MG TABLET    TAKE 1 TABLET BY MOUTH EVERY DAY     EPINEPHRINE (EPIPEN) 0.3 MG/0.3ML SOAJ INJECTION    Use as directed for allergic reaction    IBUPROFEN (ADVIL;MOTRIN) 800 MG TABLET    Take 1 tablet by mouth every 8 hours as needed for Pain or Fever    NORGESTIMATE-ETHINYL ESTRADIOL (ORTHO-CYCLEN) 0.25-35 MG-MCG PER TABLET    TAKE 1 TABLET BY MOUTH EVERY DAY     SERTRALINE (ZOLOFT) 50 MG TABLET    Take 1 tablet by mouth daily    TRIAMTERENE-HYDROCHLOROTHIAZIDE (DYAZIDE) 37.5-25 MG PER CAPSULE    TAKE 1 CAPSULE BY MOUTH EVERY DAY        Allergies     She is allergic to azithromycin, cortisone, food, promethazine, keppra [levetiracetam], penicillins, sulfa antibiotics, adhesive tape, lamictal [lamotrigine], and oxcarbazepine.     Physical Exam     INITIAL VITALS: BP: (!) 173/97, Temp: 98.8 °F (37.1 °C), Pulse: 79, Resp: 16, SpO2: 100 %   Physical Exam    General:  Well developed adult female in no acute distress, able toconverse in full sentences  HEENT:  Normocephalic, atraumatic, PERRL, extra-ocular movements intact, oropharynx, limited range of motion of the mandible with tenderness over the TMJ  Neck:  Supple, no lymphadenopathy, trachea midline, no masses  Pulmonary:   Clear to auscultation bilaterally, good air movement, no wheezes  Cardiac: Regular rate and rhythm, no M/R/G  Abdomen:  Soft,non-tender, non-distended, no rebounding or guarding, normoactive borborygmus  Musculoskeletal:  2+ pulses, no edema or clubbing  Skin:  No concerning rashes or lesions, no cyanosis  Neuro: Alert and oriented X 4,able to move all four extremities with equal strength bilaterally, sensory exam grossly intact, cranial nerves II-XII intact  Psych:  Appropriate mood and affect    Diagnostic Results     EKG       RADIOLOGY:  CT FACIAL BONES WO CONTRAST   Final Result      Unremarkable assessment of the temporomandibular joints. LABS:   No results found for this visit on 07/29/21. ED BEDSIDE ULTRASOUND:      RECENT VITALS:  BP: (!) 173/97, Temp: 98.8 °F (37.1 °C), Pulse: 79, Resp: 16, SpO2: 100 %     Procedures         ED Course     Nursing Notes, Past Medical Hx, Past Surgical Hx, Social Hx, Allergies, and Family Hx were reviewed. The patient was given the following medications:  Orders Placed This Encounter   Medications    ketorolac (TORADOL) injection 15 mg       CONSULTS:  None    MEDICAL DECISION MAKING / ASSESSMENT / Lashanda Roberto is a 31 y.o. female with a history andpresentation as described above in HPI. The patient was evaluated by myself, the ED Attending Physician. On initial encounter the patient was in no acute distress with reassuring vitals and no signs of impending hemodynamic or respiratory collapse. Patient presents to the emerge department left-sided jaw pain. She has limited range of motion was not in a fixed open position. She has tenderness over her left TMJ with no overlying signs of erythema or fluctuance. She has no signs of poor dentition. She has no swelling in her sublingual or submandibular space concerning for deep space infection. She has no change in voice or difficulty swallowing concerning for an RPA or PTA.   As the patient is still having difficulty after NSAIDs I will obtain a CT max face for evaluation of partial subluxation though her exam is not consistent with any dislocation. CT imaging without any acute concern. I discussed follow-up with ENT for TMJ syndrome. The patient voiced understanding and was discharged. Patient was treated with below medications:  Medications   ketorolac (TORADOL) injection 15 mg (has no administration in time range)         Clinical Impression     1. TMJ (sprain of temporomandibular joint), initial encounter        Disposition     PATIENT REFERREDTO:  No follow-up provider specified.     DISCHARGE MEDICATIONS:  New Prescriptions    No medications on file       DISPOSITION            Camilo Spain MD  08/03/21 5892

## 2021-08-05 DIAGNOSIS — F33.1 MAJOR DEPRESSIVE DISORDER, RECURRENT, MODERATE (HCC): ICD-10-CM

## 2021-08-05 RX ORDER — NORGESTIMATE AND ETHINYL ESTRADIOL 0.25-0.035
KIT ORAL
Qty: 28 TABLET | Refills: 0 | Status: SHIPPED | OUTPATIENT
Start: 2021-08-05 | End: 2021-08-30

## 2021-08-05 NOTE — TELEPHONE ENCOUNTER
Medication:   Requested Prescriptions     Pending Prescriptions Disp Refills    norgestimate-ethinyl estradiol (ORTHO-CYCLEN) 0.25-35 MG-MCG per tablet [Pharmacy Med Name: [facility] Estradiol Oral Tablet 0.25-35 MG-MCG] 28 tablet 0     Sig: TAKE 1 TABLET BY MOUTH EVERY DAY        Last Filled:      Patient Phone Number: [phone] (home)     Last appt: 3/3/2021   Next appt: 9/3/2021    Last OARRS:   RX Monitoring 10/9/2017   Attestation The Prescription Monitoring Report for this patient was reviewed today. Periodic Controlled Substance Monitoring No signs of potential drug abuse or diversion identified.

## 2021-08-17 DIAGNOSIS — I10 ESSENTIAL HYPERTENSION: ICD-10-CM

## 2021-08-18 RX ORDER — AMLODIPINE BESYLATE 5 MG/1
TABLET ORAL
Qty: 30 TABLET | Refills: 0 | Status: SHIPPED | OUTPATIENT
Start: 2021-08-18 | End: 2021-09-21

## 2021-08-18 NOTE — TELEPHONE ENCOUNTER
Medication:   Requested Prescriptions     Pending Prescriptions Disp Refills    amLODIPine (NORVASC) 5 MG tablet [Pharmacy Med Name: amLODIPine Besylate Oral Tablet 5 MG] 30 tablet 0     Sig: TAKE 1 TABLET BY MOUTH EVERY DAY        Last Filled:      Patient Phone Number: [phone] (home)     Last appt: 3/3/2021   Next appt: 9/9/2021    Last OARRS:   RX Monitoring 10/9/2017   Attestation The Prescription Monitoring Report for this patient was reviewed today. Periodic Controlled Substance Monitoring No signs of potential drug abuse or diversion identified.

## 2021-08-30 RX ORDER — NORGESTIMATE AND ETHINYL ESTRADIOL 0.25-0.035
KIT ORAL
Qty: 28 TABLET | Refills: 0 | Status: SHIPPED | OUTPATIENT
Start: 2021-08-30 | End: 2021-09-29

## 2021-08-30 NOTE — TELEPHONE ENCOUNTER
Medication:   Requested Prescriptions     Pending Prescriptions Disp Refills    norgestimate-ethinyl estradiol (ORTHO-CYCLEN) 0.25-35 MG-MCG per tablet [Pharmacy Med Name: Allyson Peters Estradiol Oral Tablet 0.25-35 MG-MCG] 28 tablet 0     Sig: TAKE 1 TABLET BY MOUTH EVERY DAY        Last Filled:  8/5/2021    Patient Phone Number: [phone] (home)     Last appt: 3/3/2021   Next appt: 9/9/2021    Last OARRS:   RX Monitoring 10/9/2017   Attestation The Prescription Monitoring Report for this patient was reviewed today. Periodic Controlled Substance Monitoring No signs of potential drug abuse or diversion identified. Last PDMP Jacqueline Clemente as Reviewed:    Review User Review Instant Review Result             Last Controlled Substance Monitoring Documentation     Refill from 10/8/2017 in [address] The Prescription Monitoring Report for this patient was reviewed today. filed at 10/09/2017 1733   Periodic Controlled Substance Monitoring No signs of potential drug abuse or diversion identified.  filed at 10/09/2017 9554
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
## 2021-09-08 DIAGNOSIS — F33.1 MAJOR DEPRESSIVE DISORDER, RECURRENT, MODERATE (HCC): ICD-10-CM

## 2021-09-08 NOTE — TELEPHONE ENCOUNTER
Medication:   Requested Prescriptions     Pending Prescriptions Disp Refills    sertraline (ZOLOFT) 50 MG tablet [Pharmacy Med Name: Sertraline HCl Oral Tablet 50 MG] 30 tablet 0     Sig: TAKE 1 TABLET BY MOUTH EVERY DAY        Last Filled:      Patient Phone Number: [phone] (home)     Last appt: 3/3/2021   Next appt: 9/9/2021    Last OARRS:   RX Monitoring 10/9/2017   Attestation The Prescription Monitoring Report for this patient was reviewed today. Periodic Controlled Substance Monitoring No signs of potential drug abuse or diversion identified.

## 2021-09-09 ENCOUNTER — OFFICE VISIT (OUTPATIENT)
Dept: [facility] CLINIC | Age: 36
End: 2021-09-09
Payer: COMMERCIAL

## 2021-09-09 VITALS
SYSTOLIC BLOOD PRESSURE: 139 MMHG | WEIGHT: 188.2 LBS | HEART RATE: 78 BPM | DIASTOLIC BLOOD PRESSURE: 89 MMHG | RESPIRATION RATE: 16 BRPM | BODY MASS INDEX: 31.32 KG/M2 | TEMPERATURE: 97.7 F

## 2021-09-09 DIAGNOSIS — F33.1 MAJOR DEPRESSIVE DISORDER, RECURRENT, MODERATE (HCC): ICD-10-CM

## 2021-09-09 DIAGNOSIS — I10 ESSENTIAL HYPERTENSION: Primary | ICD-10-CM

## 2021-09-09 DIAGNOSIS — R40.0 DAYTIME SLEEPINESS: ICD-10-CM

## 2021-09-09 DIAGNOSIS — R06.83 SNORING: ICD-10-CM

## 2021-09-09 DIAGNOSIS — Z12.4 CERVICAL CANCER SCREENING: ICD-10-CM

## 2021-09-09 PROCEDURE — G8427 DOCREV CUR MEDS BY ELIG CLIN: HCPCS | Performed by: FAMILY MEDICINE

## 2021-09-09 PROCEDURE — G8417 CALC BMI ABV UP PARAM F/U: HCPCS | Performed by: FAMILY MEDICINE

## 2021-09-09 PROCEDURE — 1036F TOBACCO NON-USER: CPT | Performed by: FAMILY MEDICINE

## 2021-09-09 PROCEDURE — 99214 OFFICE O/P EST MOD 30 MIN: CPT | Performed by: FAMILY MEDICINE

## 2021-09-09 ASSESSMENT — ENCOUNTER SYMPTOMS
ABDOMINAL PAIN: 0
COUGH: 0
EYE PAIN: 0
CONSTIPATION: 0
DIARRHEA: 0
SHORTNESS OF BREATH: 0

## 2021-09-20 DIAGNOSIS — I10 ESSENTIAL HYPERTENSION: ICD-10-CM

## 2021-09-21 RX ORDER — AMLODIPINE BESYLATE 5 MG/1
TABLET ORAL
Qty: 30 TABLET | Refills: 11 | Status: SHIPPED | OUTPATIENT
Start: 2021-09-21 | End: 2022-09-27 | Stop reason: SDUPTHER

## 2021-09-29 RX ORDER — NORGESTIMATE AND ETHINYL ESTRADIOL 0.25-0.035
KIT ORAL
Qty: 28 TABLET | Refills: 0 | Status: SHIPPED | OUTPATIENT
Start: 2021-09-29 | End: 2021-10-28

## 2021-11-16 ENCOUNTER — PATIENT MESSAGE (OUTPATIENT)
Dept: [facility] CLINIC | Age: 36
End: 2021-11-16

## 2021-11-17 RX ORDER — MELATONIN
1000 DAILY
Qty: 90 TABLET | Refills: 1 | Status: SHIPPED | OUTPATIENT
Start: 2021-11-17 | End: 2022-05-31

## 2021-11-26 RX ORDER — NORGESTIMATE AND ETHINYL ESTRADIOL 0.25-0.035
KIT ORAL
Qty: 28 TABLET | Refills: 0 | Status: SHIPPED | OUTPATIENT
Start: 2021-11-26 | End: 2021-12-14 | Stop reason: SDUPTHER

## 2021-11-26 NOTE — TELEPHONE ENCOUNTER
Medication:   Requested Prescriptions     Pending Prescriptions Disp Refills    norgestimate-ethinyl estradiol (ORTHO-CYCLEN) 0.25-35 MG-MCG per tablet [Pharmacy Med Name: Jonny Prieto Estradiol Oral Tablet 0.25-35 MG-MCG] 28 tablet 0     Sig: TAKE 1 TABLET BY MOUTH EVERY DAY        Last Filled:    Discontinued by: Quan Ledezma MD on 10/28/2021 08:49    Patient Phone Number: [phone] (home)     Last appt: 9/9/2021   Next appt: 12/14/2021    Last OARRS:   RX Monitoring 10/9/2017   Attestation The Prescription Monitoring Report for this patient was reviewed today. Periodic Controlled Substance Monitoring No signs of potential drug abuse or diversion identified.

## 2021-12-01 DIAGNOSIS — F33.1 MAJOR DEPRESSIVE DISORDER, RECURRENT, MODERATE (HCC): ICD-10-CM

## 2021-12-01 NOTE — TELEPHONE ENCOUNTER
Medication:   Requested Prescriptions     Pending Prescriptions Disp Refills    sertraline (ZOLOFT) 50 MG tablet [Pharmacy Med Name: Sertraline HCl Oral Tablet 50 MG] 30 tablet 0     Sig: TAKE 1 TABLET BY MOUTH EVERY DAY        Last Filled:  11/1/2021    Patient Phone Number: [phone] (home)     Last appt: 9/9/2021   Next appt: 12/14/2021    Last OARRS:   RX Monitoring 10/9/2017   Attestation The Prescription Monitoring Report for this patient was reviewed today. Periodic Controlled Substance Monitoring No signs of potential drug abuse or diversion identified.

## 2021-12-14 ENCOUNTER — OFFICE VISIT (OUTPATIENT)
Dept: [facility] CLINIC | Age: 36
End: 2021-12-14
Payer: COMMERCIAL

## 2021-12-14 VITALS
WEIGHT: 182.2 LBS | HEART RATE: 70 BPM | RESPIRATION RATE: 16 BRPM | DIASTOLIC BLOOD PRESSURE: 80 MMHG | TEMPERATURE: 97.5 F | BODY MASS INDEX: 30.32 KG/M2 | SYSTOLIC BLOOD PRESSURE: 138 MMHG

## 2021-12-14 DIAGNOSIS — L30.9 DERMATITIS: ICD-10-CM

## 2021-12-14 DIAGNOSIS — I10 ESSENTIAL HYPERTENSION: Primary | ICD-10-CM

## 2021-12-14 DIAGNOSIS — Z78.9 USES BIRTH CONTROL: ICD-10-CM

## 2021-12-14 DIAGNOSIS — F41.1 GAD (GENERALIZED ANXIETY DISORDER): ICD-10-CM

## 2021-12-14 DIAGNOSIS — F33.1 MAJOR DEPRESSIVE DISORDER, RECURRENT, MODERATE (HCC): ICD-10-CM

## 2021-12-14 PROCEDURE — 1036F TOBACCO NON-USER: CPT | Performed by: FAMILY MEDICINE

## 2021-12-14 PROCEDURE — G8427 DOCREV CUR MEDS BY ELIG CLIN: HCPCS | Performed by: FAMILY MEDICINE

## 2021-12-14 PROCEDURE — G8417 CALC BMI ABV UP PARAM F/U: HCPCS | Performed by: FAMILY MEDICINE

## 2021-12-14 PROCEDURE — G8484 FLU IMMUNIZE NO ADMIN: HCPCS | Performed by: FAMILY MEDICINE

## 2021-12-14 PROCEDURE — 99214 OFFICE O/P EST MOD 30 MIN: CPT | Performed by: FAMILY MEDICINE

## 2021-12-14 RX ORDER — CLOTRIMAZOLE AND BETAMETHASONE DIPROPIONATE 10; .64 MG/G; MG/G
CREAM TOPICAL
Qty: 45 G | Refills: 1 | Status: SHIPPED | OUTPATIENT
Start: 2021-12-14 | End: 2022-01-11

## 2021-12-14 RX ORDER — NORGESTIMATE AND ETHINYL ESTRADIOL 0.25-0.035
KIT ORAL
Qty: 28 TABLET | Refills: 0 | Status: SHIPPED | OUTPATIENT
Start: 2021-12-14 | End: 2022-01-11 | Stop reason: SDUPTHER

## 2021-12-14 RX ORDER — SERTRALINE HYDROCHLORIDE 25 MG/1
25 TABLET, FILM COATED ORAL DAILY
Qty: 90 TABLET | Refills: 3 | Status: SHIPPED | OUTPATIENT
Start: 2021-12-14 | End: 2022-01-11

## 2021-12-14 ASSESSMENT — ENCOUNTER SYMPTOMS
DIARRHEA: 0
CONSTIPATION: 0
SHORTNESS OF BREATH: 0
ABDOMINAL PAIN: 0
COUGH: 0
EYE PAIN: 0

## 2021-12-14 NOTE — PROGRESS NOTES
Chief Complaint   Patient presents with    Hypertension    Depression    Weight Management         ASSESSMENT/PLAN:  1. Essential hypertension  Continue regimen  Controlled in triage  Follow up in 4 weeks to gauge improvement with wt loss  If BP is below goal, will remove norvasc    2. Major depressive disorder, recurrent, moderate (HCC)  Increase zoloft to 75 mg daily  Follow up in 4 weeks to gauge improvement  Continue BH PRN  - sertraline (ZOLOFT) 25 MG tablet; Take 1 tablet by mouth daily  Dispense: 90 tablet; Refill: 3  - sertraline (ZOLOFT) 50 MG tablet; TAKE 1 TABLET BY MOUTH EVERY DAY  Dispense: 90 tablet; Refill: 3    3. DAKOTA (generalized anxiety disorder)  Increase zoloft to 75 mg daily  Follow up in 4 weeks to gauge improvement  Continue BH PRN  - sertraline (ZOLOFT) 25 MG tablet; Take 1 tablet by mouth daily  Dispense: 90 tablet; Refill: 3    4. Uses birth control  refill  - norgestimate-ethinyl estradiol (ORTHO-CYCLEN) 0.25-35 MG-MCG per tablet; TAKE 1 TABLET BY MOUTH EVERY DAY  Dispense: 28 tablet; Refill: 0    5. Dermatitis  May be fungal given the dry itchy nature. Try steroid and antifungal cream  Follow up in 4 weeks to gauge improvement. - clotrimazole-betamethasone (LOTRISONE) 1-0.05 % cream; Apply topically 2 times daily. Dispense: 45 g; Refill: 1           HPI:  Any Garrett is a 31 y.o. (: 1985) here today for chronic condition mgmt. HTN  Rx: amlodipine and dyazide   Compliant: yes  Does not check BP at home. Lab Results   Component Value Date    CREATININE 0.6 2021     BP Readings from Last 3 Encounters:   21 138/80   21 139/89   21 (!) 173/97       MDD +DAKOTA  Rx: Zoloft 50 mg daily  Feels a lot more stress near holidays and  is dealing with mental issues and verbally abusive. Open to increase medication dose.        Wt Readings from Last 3 Encounters:   21 182 lb 3.2 oz (82.6 kg)   21 188 lb 3.2 oz (85.4 kg)   21 188 lb (85.3 kg)   Has been using [employer] and it is working out well  She is feeling better with the dietary changes made     She notes an itchy rash sometimes under her wedding ring. She will remove her ring and use aloe or vit E on it. No improvements. Itchy and red. Review of Systems   Constitutional: Negative for appetite change, chills, fatigue and fever. HENT: Negative for congestion. Eyes: Negative for pain and visual disturbance. Respiratory: Negative for cough and shortness of breath. Cardiovascular: Negative for chest pain and palpitations. Gastrointestinal: Negative for abdominal pain, constipation and diarrhea. Genitourinary: Negative for difficulty urinating. Musculoskeletal: Negative for arthralgias. Skin: Positive for rash. Negative for wound. Neurological: Negative for dizziness, weakness, light-headedness and headaches. Hematological: Does not bruise/bleed easily. Psychiatric/Behavioral: Positive for sleep disturbance. Negative for behavioral problems. The patient is nervous/anxious.         Past Medical History:   Diagnosis Date    Asthma     Chicken pox     Degenerative disc disease, lumbar     Guillain Barré syndrome ([facility] [address] 8/4/2016    Hyperlipidemia     Kidney disease     Meniere's disease     Seizure ([facility] [address]     none since 2013    Trimalleolar fracture of ankle, closed, right, initial encounter 2/27/2018    Viral hepatitis        Family History   Problem Relation Age of Onset    High Cholesterol Father     Diabetes Paternal Grandmother     High Cholesterol Paternal Grandmother     Diabetes Paternal Grandfather     High Cholesterol Paternal Grandfather     Cancer Paternal Grandfather         colon    Arthritis Mother     Depression Mother     Depression Sister     Schizophrenia Sister     Heart Defect Brother     Mult Sclerosis Paternal Aunt        Social History     Tobacco Use    Smoking status: Never Smoker    Smokeless tobacco: Never Used   Vaping Use    Vaping Use: Never used   Substance Use Topics    Alcohol use: Yes     Alcohol/week: 0.0 standard drinks     Comment: rare celebratory    Drug use: Yes       New Prescriptions    CLOTRIMAZOLE-BETAMETHASONE (LOTRISONE) 1-0.05 % CREAM    Apply topically 2 times daily. SERTRALINE (ZOLOFT) 25 MG TABLET    Take 1 tablet by mouth daily       Meds Prior to visit:  Current Outpatient Medications on File Prior to Visit   Medication Sig Dispense Refill    vitamin D3 (CHOLECALCIFEROL) 25 MCG (1000 UT) TABS tablet Take 1 tablet by mouth daily 90 tablet 1    amLODIPine (NORVASC) 5 MG tablet TAKE 1 TABLET BY MOUTH EVERY DAY  30 tablet 11    albuterol sulfate  (90 Base) MCG/ACT inhaler INHALE 2 PUFFS BY MOUTH EVERY SIX HOURS AS NEEDED FOR WHEEZING 8.5 g 0    triamterene-hydroCHLOROthiazide (DYAZIDE) 37.5-25 MG per capsule TAKE 1 CAPSULE BY MOUTH EVERY DAY  90 capsule 2     No current facility-administered medications on file prior to visit. Allergies   Allergen Reactions    Azithromycin Diarrhea    Cortisone Anaphylaxis     fulll blackout for 5 minutes, w/ smelling salts to come to consciousness    Food Anaphylaxis     Red onions    Gabapentin     Promethazine Other (See Comments)     Patient states siezures  SEIZURES    Keppra [Levetiracetam] Other (See Comments)     SEIZURES    Penicillins     Sulfa Antibiotics     Adhesive Tape Rash    Lamictal [Lamotrigine] Rash    Oxcarbazepine Rash       OBJECTIVE:  /80   Pulse 70   Temp 97.5 °F (36.4 °C) (Temporal)   Resp 16   Wt 182 lb 3.2 oz (82.6 kg)   LMP 11/30/2021   Breastfeeding No   BMI 30.32 kg/m²   BP Readings from Last 2 Encounters:   12/14/21 138/80   09/09/21 139/89     Wt Readings from Last 3 Encounters:   12/14/21 182 lb 3.2 oz (82.6 kg)   09/09/21 188 lb 3.2 oz (85.4 kg)   07/29/21 188 lb (85.3 kg)       Physical Exam  Vitals reviewed. Constitutional:       General: She is not in acute distress. Appearance: Normal appearance. She is not ill-appearing. HENT:      Head: Normocephalic and atraumatic. Right Ear: External ear normal.      Left Ear: External ear normal.      Nose: Nose normal. No congestion. Mouth/Throat:      Mouth: Mucous membranes are moist.      Pharynx: Oropharynx is clear. No oropharyngeal exudate. Eyes:      Extraocular Movements: Extraocular movements intact. Conjunctiva/sclera: Conjunctivae normal.      Pupils: Pupils are equal, round, and reactive to light. Cardiovascular:      Rate and Rhythm: Normal rate and regular rhythm. Pulses: Normal pulses. Heart sounds: Normal heart sounds. Pulmonary:      Effort: Pulmonary effort is normal. No respiratory distress. Breath sounds: Normal breath sounds. No stridor. No wheezing, rhonchi or rales. Abdominal:      General: Bowel sounds are normal.      Palpations: Abdomen is soft. Tenderness: There is no abdominal tenderness. Musculoskeletal:         General: Normal range of motion. Cervical back: Normal range of motion and neck supple. Right lower leg: No edema. Left lower leg: No edema. Skin:     General: Skin is warm. Capillary Refill: Capillary refill takes less than 2 seconds. Findings: No erythema or rash. Neurological:      General: No focal deficit present. Mental Status: She is alert and oriented to person, place, and time. Mental status is at baseline. Motor: No weakness. Coordination: Coordination normal.      Gait: Gait normal.   Psychiatric:         Mood and Affect: Mood normal.         Behavior: Behavior normal.         Thought Content:  Thought content normal.         Judgment: Judgment normal.         Lab Results   Component Value Date    WBC 10.4 07/01/2020    HGB 14.3 07/01/2020    HCT 43.3 07/01/2020    MCV 85.1 07/01/2020     (H) 07/01/2020     Lab Results   Component Value Date     03/03/2021    K 3.7 03/03/2021    CL 97 (L) 03/03/2021    CO2 25 03/03/2021 BUN 8 03/03/2021    CREATININE 0.6 03/03/2021    GLUCOSE 99 03/03/2021    CALCIUM 10.1 03/03/2021    PROT 7.5 03/03/2021    LABALBU 4.6 03/03/2021    BILITOT <0.2 03/03/2021    ALKPHOS 85 03/03/2021    AST 13 (L) 03/03/2021    ALT 10 03/03/2021    LABGLOM >60 03/03/2021    GFRAA >60 03/03/2021    AGRATIO 1.6 03/03/2021    GLOB 2.9 03/03/2021     Lab Results   Component Value Date    CHOL 204 (H) 07/01/2020    CHOL 228 (H) 10/31/2013    CHOL 215 (H) 03/30/2013     Lab Results   Component Value Date    TRIG 292 (H) 07/01/2020    TRIG 178 (H) 10/31/2013    TRIG 154 (H) 03/30/2013     Lab Results   Component Value Date    HDL 42 07/01/2020    HDL 56 10/31/2013    HDL 59 03/30/2013     Lab Results   Component Value Date    LDLCALC 104 (H) 07/01/2020    LDLCALC 136 (H) 10/31/2013    LDLCALC 125 (H) 03/30/2013     Lab Results   Component Value Date    LABVLDL 58 07/01/2020    LABVLDL 36 10/31/2013    LABVLDL 31 03/30/2013     Lab Results   Component Value Date    LABA1C 5.4 07/01/2020         Discussed use, benefit, and side effects of prescribed medications. Barriers to medication compliance addressed. All patient questions answered. Pt voiced understanding. RTC Return in about 4 weeks (around 1/11/2022).     Future Appointments   Date Time Provider Abelardo Cardona   1/11/2022  9:30 AM [facility]   3/7/2022 10:40 AM Amparo Yeh MD [facility]       Kaiden Love MD  12/14/2021  10:28 AM

## 2022-01-11 ENCOUNTER — OFFICE VISIT (OUTPATIENT)
Dept: [facility] CLINIC | Age: 37
End: 2022-01-11
Payer: COMMERCIAL

## 2022-01-11 VITALS
TEMPERATURE: 98.2 F | SYSTOLIC BLOOD PRESSURE: 129 MMHG | BODY MASS INDEX: 30.35 KG/M2 | HEART RATE: 72 BPM | WEIGHT: 182.4 LBS | RESPIRATION RATE: 16 BRPM | DIASTOLIC BLOOD PRESSURE: 87 MMHG

## 2022-01-11 DIAGNOSIS — I10 ESSENTIAL HYPERTENSION: Primary | ICD-10-CM

## 2022-01-11 DIAGNOSIS — F41.1 GAD (GENERALIZED ANXIETY DISORDER): ICD-10-CM

## 2022-01-11 DIAGNOSIS — Z78.9 USES BIRTH CONTROL: ICD-10-CM

## 2022-01-11 DIAGNOSIS — F33.1 MAJOR DEPRESSIVE DISORDER, RECURRENT, MODERATE (HCC): ICD-10-CM

## 2022-01-11 DIAGNOSIS — L30.9 DERMATITIS: ICD-10-CM

## 2022-01-11 DIAGNOSIS — M25.511 ACUTE PAIN OF RIGHT SHOULDER: ICD-10-CM

## 2022-01-11 PROCEDURE — G8427 DOCREV CUR MEDS BY ELIG CLIN: HCPCS | Performed by: FAMILY MEDICINE

## 2022-01-11 PROCEDURE — G8484 FLU IMMUNIZE NO ADMIN: HCPCS | Performed by: FAMILY MEDICINE

## 2022-01-11 PROCEDURE — 1036F TOBACCO NON-USER: CPT | Performed by: FAMILY MEDICINE

## 2022-01-11 PROCEDURE — 99214 OFFICE O/P EST MOD 30 MIN: CPT | Performed by: FAMILY MEDICINE

## 2022-01-11 PROCEDURE — G8417 CALC BMI ABV UP PARAM F/U: HCPCS | Performed by: FAMILY MEDICINE

## 2022-01-11 RX ORDER — NORGESTIMATE AND ETHINYL ESTRADIOL 0.25-0.035
KIT ORAL
Qty: 28 TABLET | Refills: 0 | Status: SHIPPED | OUTPATIENT
Start: 2022-01-11 | End: 2022-01-11

## 2022-01-11 RX ORDER — NORGESTIMATE AND ETHINYL ESTRADIOL 0.25-0.035
KIT ORAL
Qty: 84 TABLET | Refills: 0 | Status: SHIPPED | OUTPATIENT
Start: 2022-01-11 | End: 2022-05-05 | Stop reason: SDUPTHER

## 2022-01-11 RX ORDER — CLOBETASOL PROPIONATE 0.5 MG/G
CREAM TOPICAL
Qty: 30 G | Refills: 1 | Status: SHIPPED | OUTPATIENT
Start: 2022-01-11

## 2022-01-11 RX ORDER — MELOXICAM 15 MG/1
15 TABLET ORAL DAILY
Qty: 90 TABLET | Refills: 1 | Status: SHIPPED | OUTPATIENT
Start: 2022-01-11 | End: 2022-09-21

## 2022-01-11 RX ORDER — SERTRALINE HYDROCHLORIDE 100 MG/1
100 TABLET, FILM COATED ORAL DAILY
Qty: 90 TABLET | Refills: 3 | Status: SHIPPED | OUTPATIENT
Start: 2022-01-11

## 2022-01-11 ASSESSMENT — ENCOUNTER SYMPTOMS
DIARRHEA: 0
EYE PAIN: 0
ABDOMINAL PAIN: 0
CONSTIPATION: 0
COUGH: 0
SHORTNESS OF BREATH: 0

## 2022-01-11 NOTE — PATIENT INSTRUCTIONS
Patient Education        Shoulder Bursitis: Exercises  Introduction  Here are some examples of exercises for you to try. The exercises may be suggested for a condition or for rehabilitation. Start each exercise slowly. Ease off the exercises if you start to have pain. You will be told when to start these exercises and which ones will work best for you. How to do the exercises  Posterior stretching exercise    1. Hold the elbow of your injured arm with your other hand. 2. Use your hand to pull your injured arm gently up and across your body. You will feel a gentle stretch across the back of your injured shoulder. 3. Hold for at least 15 to 30 seconds. Then slowly lower your arm. 4. Repeat 2 to 4 times. Up-the-back stretch    Your doctor or physical therapist may want you to wait to do this stretch until you have regained most of your range of motion and strength. You can do this stretch in different ways. Hold any of these stretches for at least 15 to 30 seconds. Repeat them 2 to 4 times. 1. Light stretch: Put your hand in your back pocket. Let it rest there to stretch your shoulder. 2. Moderate stretch: With your other hand, hold your injured arm (palm outward) behind your back by the wrist. Pull your arm up gently to stretch your shoulder. 3. Advanced stretch: Put a towel over your other shoulder. Put the hand of your injured arm behind your back. Now hold the back end of the towel. With the other hand, hold the front end of the towel in front of your body. Pull gently on the front end of the towel. This will bring your hand farther up your back to stretch your shoulder. Overhead stretch    1. Standing about an arm's length away, grasp onto a solid surface. You could use a countertop, a doorknob, or the back of a sturdy chair. 2. With your knees slightly bent, bend forward with your arms straight. Lower your upper body, and let your shoulders stretch.   3. As your shoulders are able to stretch farther, you may need to take a step or two backward. 4. Hold for at least 15 to 30 seconds. Then stand up and relax. If you had stepped back during your stretch, step forward so you can keep your hands on the solid surface. 5. Repeat 2 to 4 times. Shoulder flexion (lying down)    To make a wand for this exercise, use a piece of PVC pipe or a broom handle with the broom removed. Make the wand about a foot wider than your shoulders. 1. Lie on your back, holding a wand with both hands. Your palms should face down as you hold the wand. 2. Keeping your elbows straight, slowly raise your arms over your head. Raise them until you feel a stretch in your shoulders, upper back, and chest.  3. Hold for 15 to 30 seconds. 4. Repeat 2 to 4 times. Shoulder rotation (lying down)    To make a wand for this exercise, use a piece of PVC pipe or a broom handle with the broom removed. Make the wand about a foot wider than your shoulders. 1. Lie on your back. Hold a wand with both hands with your elbows bent and palms up. 2. Keep your elbows close to your body, and move the wand across your body toward the sore arm. 3. Hold for 8 to 12 seconds. 4. Repeat 2 to 4 times. Shoulder blade squeeze    1. Stand with your arms at your sides, and squeeze your shoulder blades together. Do not raise your shoulders up as you squeeze. 2. Hold 6 seconds. 3. Repeat 8 to 12 times. Shoulder flexor and extensor exercise    These are isometric exercises. That means you contract your muscles without actually moving. 1. Push forward (flex): Stand facing a wall or doorjamb, about 6 inches or less back. Hold your injured arm against your body. Make a closed fist with your thumb on top. Then gently push your hand forward into the wall with about 25% to 50% of your strength. Don't let your body move backward as you push. Hold for about 6 seconds. Relax for a few seconds. Repeat 8 to 12 times.   2. Push backward (extend): Stand with your back flat against a wall. Your upper arm should be against the wall, with your elbow bent 90 degrees (your hand straight ahead). Push your elbow gently back against the wall with about 25% to 50% of your strength. Don't let your body move forward as you push. Hold for about 6 seconds. Relax for a few seconds. Repeat 8 to 12 times. Scapular exercise: Wall push-ups    This exercise is best done with your fingers somewhat turned out, rather than straight up and down. 1. Stand facing a wall, about 12 inches to 18 inches away. 2. Place your hands on the wall at shoulder height. 3. Slowly bend your elbows and bring your face to the wall. Keep your back and hips straight. 4. Push back to where you started. 5. Repeat 8 to 12 times. 6. When you can do this exercise against a wall comfortably, you can try it against a counter. You can then slowly progress to the end of a couch, then to a sturdy chair, and finally to the floor. Scapular exercise: Retraction    For this exercise, you will need elastic exercise material, such as surgical tubing or Thera-Band. 1. Put the band around a solid object at about waist level. (A bedpost will work well.) Each hand should hold an end of the band. 2. With your elbows at your sides and bent to 90 degrees, pull the band back. Your shoulder blades should move toward each other. Then move your arms back where you started. 3. Repeat 8 to 12 times. 4. If you have good range of motion in your shoulders, try this exercise with your arms lifted out to the sides. Keep your elbows at a 90-degree angle. Raise the elastic band up to about shoulder level. Pull the band back to move your shoulder blades toward each other. Then move your arms back where you started. Internal rotator strengthening exercise    1. Start by tying a piece of elastic exercise material to a doorknob. You can use surgical tubing or Thera-Band. 2. Stand or sit with your shoulder relaxed and your elbow bent 90 degrees.  Your upper arm should rest comfortably against your side. Squeeze a rolled towel between your elbow and your body for comfort. This will help keep your arm at your side. 3. Hold one end of the elastic band in the hand of the painful arm. 4. Slowly rotate your forearm toward your body until it touches your belly. Slowly move it back to where you started. 5. Keep your elbow and upper arm firmly tucked against the towel roll or at your side. 6. Repeat 8 to 12 times. External rotator strengthening exercise    1. Start by tying a piece of elastic exercise material to a doorknob. You can use surgical tubing or Thera-Band. (You may also hold one end of the band in each hand.)  2. Stand or sit with your shoulder relaxed and your elbow bent 90 degrees. Your upper arm should rest comfortably against your side. Squeeze a rolled towel between your elbow and your body for comfort. This will help keep your arm at your side. 3. Hold one end of the elastic band with the hand of the painful arm. 4. Start with your forearm across your belly. Slowly rotate the forearm out away from your body. Keep your elbow and upper arm tucked against the towel roll or the side of your body until you begin to feel tightness in your shoulder. Slowly move your arm back to where you started. 5. Repeat 8 to 12 times. Follow-up care is a key part of your treatment and safety. Be sure to make and go to all appointments, and call your doctor if you are having problems. It's also a good idea to know your test results and keep a list of the medicines you take. Where can you learn more? Go to https://[employer]. org and sign in to your [employer] account. Enter D210 in the [employer] box to learn more about \"Shoulder Bursitis: Exercises. \"     If you do not have an account, please click on the \"Sign Up Now\" link. Current as of: July 1, 2021               Content Version: 13.1  © 5160-1076 Healthwise, Incorporated.    Care instructions adapted under license by [facility] ([facility]). If you have questions about a medical condition or this instruction, always ask your healthcare professional. [place] [address] any warranty or liability for your use of this information. Patient Education        Shoulder Blade: Exercises  Introduction  Here are some examples of exercises for you to try. The exercises may be suggested for a condition or for rehabilitation. Start each exercise slowly. Ease off the exercises if you start to have pain. You will be told when to start these exercises and which ones will work best for you. How to do the exercises  Shoulder roll    1. Stand tall with your chin slightly tucked. Imagine that a string at the top of your head is pulling you straight up. 2. Keep your arms relaxed. All motion will be in your shoulders. 3. Shrug your shoulders up toward your ears, then up and back. [place] your shoulders down and back, like you're sliding your hands down into your back pants pockets. 4. Repeat the circles at least 2 to 4 times. 5. This exercise is also helpful anytime you want to relax. Lower neck and upper back stretch    1. With your arms about shoulder height, clasp your hands in front of you. 2. Drop your chin toward your chest.  3. Reach straight forward so you are rounding your upper back. Think about pulling your shoulder blades apart. Jackson Amado feel a stretch across your upper back and shoulders. Hold for at least 6 seconds. 4. Repeat 2 to 4 times. Triceps stretch    1. Reach your arm straight up. 2. Keeping your elbow in place, bend your arm and reach your hand down behind your back. 3. With your other hand, apply gentle pressure to the bent elbow. [place] Amado feel a stretch at the back of your upper arm and shoulder. Hold about 6 seconds. 4. Repeat 2 to 4 times with each arm. Shoulder stretch    1. Relax your shoulders.   2. Raise one arm to shoulder height, and reach it across your chest.  3. Pull the arm slightly toward you with your other arm. This will help you get a gentle stretch. Hold for about 6 seconds. 4. Repeat 2 to 4 times. Shoulder blade squeeze    1. Sit or stand up tall with your arms at your sides. 2. Keep your shoulders relaxed and down, not shrugged. 3. Squeeze your shoulder blades together. Hold for 6 seconds, then relax. 4. Repeat 8 to 12 times. Straight-arm shoulder blade squeeze    1. Sit or stand tall. Relax your shoulders. 2. With palms down, hold your elastic tubing or band straight out in front of you. 3. Start with slight tension in the tubing or band, with your hands about shoulder-width apart. 4. Slowly pull straight out to the sides, squeezing your shoulder blades together. Keep your arms straight and at shoulder height. Slowly release. 5. Repeat 8 to 12 times. Rowing    1. [place] your elastic tubing or band at about waist height. Take one end in each hand. 2. Sit or stand with your feet hip-width apart. 3. Hold your arms straight in front of you. Adjust your distance to create slight tension in the tubing or band. 4. Slightly tuck your chin. Relax your shoulders. 5. Without shrugging your shoulders, pull straight back. Your elbows will pass alongside your waist.  Pull-downs    1. [place] your elastic tubing or band in the top of a closed door. Take one end in each hand. 2. Either sit or stand, depending on what is more comfortable. If you feel unsteady, sit on a chair. 3. Start with your arms up and comfortably apart, elbows straight. There should be a slight tension in the tubing or band. 4. Slightly tuck your chin, and look straight ahead. 5. Keeping your back straight, slowly pull down and back, bending your elbows. 6. Stop where your hands are level with your chin, in a \"goalpost\" position. 7. Repeat 8 to 12 times. Chest T stretch    1. Lie on your back. Raise your knees so they are bent. Plant your feet on the floor, hip-width apart.   2. Tuck your chin, and relax your shoulders. 3. Reach your arms straight out to the sides. If you don't feel a mild stretch in your shoulders and across your chest, use a foam roll or a tightly rolled blanket under your spine, from your tailbone to your head. 4. Relax in this position for at least 15 to 30 seconds while you breathe normally. Repeat 2 to 4 times. 5. As you get used to this stretch, keep adding a little more time until you are able relax in this position for 2 or 3 minutes. When you can relax for at least 2 minutes, you only need to do the exercise 1 time per session. Chest goalpost stretch    1. Lie on your back. Raise your knees so they are bent. Plant your feet on the floor, hip-width apart. 2. Tuck your chin, and relax your shoulders. 3. Reach your arms straight out to the sides. 4. Bend your arms at the elbows, with your hands pointed toward the top of your head. Your arms should make an L on either side of your head. Your palms should be facing up. 5. If you don't feel a mild stretch in your shoulders and across your chest, use a foam roll or tightly rolled blanket under your spine, from your tailbone to your head. 6. Relax in this position for at least 15 to 30 seconds while you breathe normally. Repeat 2 to 4 times. 7. Each day you do this exercise, add a little more time until you can relax in this position for 2 or 3 minutes. When you can relax for at least 2 minutes, you only need to do the exercise 1 time per session. Follow-up care is a key part of your treatment and safety. Be sure to make and go to all appointments, and call your doctor if you are having problems. It's also a good idea to know your test results and keep a list of the medicines you take. Where can you learn more? Go to https://[employer]. org and sign in to your [employer] account. Enter [id number] in the [employer] box to learn more about \"Shoulder Blade: Exercises. \"     If you do not have an account, please click on the \"Sign Up Now\" link. Current as of: July 1, 2021               Content Version: 13.1  © 2006-2021 [employer]. Care instructions adapted under license by [facility] ([facility]). If you have questions about a medical condition or this instruction, always ask your healthcare professional. [address] any warranty or liability for your use of this information. Patient Education        Rotator Cuff: Exercises  Introduction  Here are some examples of exercises for you to try. The exercises may be suggested for a condition or for rehabilitation. Start each exercise slowly. Ease off the exercises if you start to have pain. You will be told when to start these exercises and which ones will work best for you. How to do the exercises  Pendulum swing    If you have pain in your back, do not do this exercise. 1. Hold on to a table or the back of a chair with your good arm. Then bend forward a little and let your sore arm hang straight down. This exercise does not use the arm muscles. Rather, use your legs and your hips to create movement that makes your arm swing freely. 2. Use the movement from your hips and legs to guide the slightly swinging arm back and forth like a pendulum (or elephant trunk). Then guide it in circles that start small (about the size of a dinner plate). Make the circles a bit larger each day, as your pain allows. 3. Do this exercise for 5 minutes, 5 to 7 times each day. 4. As you have less pain, try bending over a little farther to do this exercise. This will increase the amount of movement at your shoulder. Posterior stretching exercise    1. Hold the elbow of your injured arm with your other hand. 2. Use your hand to pull your injured arm gently up and across your body. You will feel a gentle stretch across the back of your injured shoulder. 3. Hold for at least 15 to 30 seconds. Then slowly lower your arm. 4. Repeat 2 to 4 times.   Chelsey Nunez stretch    Your doctor or physical therapist may want you to wait to do this stretch until you have regained most of your range of motion and strength. You can do this stretch in different ways. Hold any of these stretches for at least 15 to 30 seconds. Repeat them 2 to 4 times. 1. Light stretch: Put your hand in your back pocket. Let it rest there to stretch your shoulder. 2. Moderate stretch: With your other hand, hold your injured arm (palm outward) behind your back by the wrist. Pull your arm up gently to stretch your shoulder. 3. Advanced stretch: Put a towel over your other shoulder. Put the hand of your injured arm behind your back. Now hold the back end of the towel. With the other hand, hold the front end of the towel in front of your body. Pull gently on the front end of the towel. This will bring your hand farther up your back to stretch your shoulder. Overhead stretch    1. Standing about an arm's length away, grasp onto a solid surface. You could use a countertop, a doorknob, or the back of a sturdy chair. 2. With your knees slightly bent, bend forward with your arms straight. Lower your upper body, and let your shoulders stretch. 3. As your shoulders are able to stretch farther, you may need to take a step or two backward. 4. Hold for at least 15 to 30 seconds. Then stand up and relax. If you had stepped back during your stretch, step forward so you can keep your hands on the solid surface. 5. Repeat 2 to 4 times. Shoulder flexion (lying down)    To make a wand for this exercise, use a piece of PVC pipe or a broom handle with the broom removed. Make the wand about a foot wider than your shoulders. 1. Lie on your back, holding a wand with both hands. Your palms should face down as you hold the wand. 2. Keeping your elbows straight, slowly raise your arms over your head. Raise them until you feel a stretch in your shoulders, upper back, and chest.  3. Hold for 15 to 30 seconds.   4. Repeat 2 to 4 times. Shoulder rotation (lying down)    To make a wand for this exercise, use a piece of PVC pipe or a broom handle with the broom removed. Make the wand about a foot wider than your shoulders. 1. Lie on your back. Hold a wand with both hands with your elbows bent and palms up. 2. Keep your elbows close to your body, and move the wand across your body toward the sore arm. 3. Hold for 8 to 12 seconds. 4. Repeat 2 to 4 times. Wall climbing (to the side)    Avoid any movement that is straight to your side, and be careful not to arch your back. Your arm should stay about 30 degrees to the front of your side. 1. Stand with your side to a wall so that your fingers can just touch it at an angle about 30 degrees toward the front of your body. 2. Walk the fingers of your injured arm up the wall as high as pain permits. Try not to shrug your shoulder up toward your ear as you move your arm up. 3. Hold that position for a count of at least 15 to 20.  4. Walk your fingers back down to the starting position. 5. Repeat at least 2 to 4 times. Try to reach higher each time. Wall climbing (to the front)    During this stretching exercise, be careful not to arch your back. 1. Face a wall, and stand so your fingers can just touch it. 2. Keeping your shoulder down, walk the fingers of your injured arm up the wall as high as pain permits. (Don't shrug your shoulder up toward your ear.)  3. Hold your arm in that position for at least 15 to 30 seconds. 4. Slowly walk your fingers back down to where you started. 5. Repeat at least 2 to 4 times. Try to reach higher each time. Shoulder blade squeeze    1. Stand with your arms at your sides, and squeeze your shoulder blades together. Do not raise your shoulders up as you squeeze. 2. Hold 6 seconds. 3. Repeat 8 to 12 times. Scapular exercise: Arm reach    1. Lie flat on your back.  This exercise is a very slight motion that starts with your arms raised (elbows straight, arms straight). 2. From this position, reach higher toward the mabel or ceiling. Keep your elbows straight. All motion should be from your shoulder blade only. 3. Relax your arms back to where you started. 4. Repeat 8 to 12 times. Arm raise to the side    During this strengthening exercise, your arm should stay about 30 degrees to the front of your side. 1. Slowly raise your injured arm to the side, with your thumb facing up. Raise your arm 60 degrees at the most (shoulder level is 90 degrees). 2. Hold the position for 3 to 5 seconds. Then lower your arm back to your side. If you need to, bring your \"good\" arm across your body and place it under the elbow as you lower your injured arm. Use your good arm to keep your injured arm from dropping down too fast.  3. Repeat 8 to 12 times. 4. When you first start out, don't hold any extra weight in your hand. As you get stronger, you may use a 1-pound to 2-pound dumbbell or a small can of food. Shoulder flexor and extensor exercise    These are isometric exercises. That means you contract your muscles without actually moving. 1. Push forward (flex): Stand facing a wall or doorjamb, about 6 inches or less back. Hold your injured arm against your body. Make a closed fist with your thumb on top. Then gently push your hand forward into the wall with about 25% to 50% of your strength. Don't let your body move backward as you push. Hold for about 6 seconds. Relax for a few seconds. Repeat 8 to 12 times. 2. Push backward (extend): Stand with your back flat against a wall. Your upper arm should be against the wall, with your elbow bent 90 degrees (your hand straight ahead). Push your elbow gently back against the wall with about 25% to 50% of your strength. Don't let your body move forward as you push. Hold for about 6 seconds. Relax for a few seconds. Repeat 8 to 12 times.   Scapular exercise: Wall push-ups    This exercise is best done with your fingers somewhat turned out, rather than straight up and down. 1. Stand facing a wall, about 12 inches to 18 inches away. 2. Place your hands on the wall at shoulder height. 3. Slowly bend your elbows and bring your face to the wall. Keep your back and hips straight. 4. Push back to where you started. 5. Repeat 8 to 12 times. 6. When you can do this exercise against a wall comfortably, you can try it against a counter. You can then slowly progress to the end of a couch, then to a sturdy chair, and finally to the floor. Scapular exercise: Retraction    For this exercise, you will need elastic exercise material, such as surgical tubing or Thera-Band. 1. Put the band around a solid object at about waist level. (A bedpost will work well.) Each hand should hold an end of the band. 2. With your elbows at your sides and bent to 90 degrees, pull the band back. Your shoulder blades should move toward each other. Then move your arms back where you started. 3. Repeat 8 to 12 times. 4. If you have good range of motion in your shoulders, try this exercise with your arms lifted out to the sides. Keep your elbows at a 90-degree angle. Raise the elastic band up to about shoulder level. Pull the band back to move your shoulder blades toward each other. Then move your arms back where you started. Internal rotator strengthening exercise    1. Start by tying a piece of elastic exercise material to a doorknob. You can use surgical tubing or Thera-Band. 2. Stand or sit with your shoulder relaxed and your elbow bent 90 degrees. Your upper arm should rest comfortably against your side. Squeeze a rolled towel between your elbow and your body for comfort. This will help keep your arm at your side. 3. Hold one end of the elastic band in the hand of the painful arm. 4. Slowly rotate your forearm toward your body until it touches your belly. Slowly move it back to where you started.   5. Keep your elbow and upper arm firmly tucked against the towel roll or at your side. 6. Repeat 8 to 12 times. External rotator strengthening exercise    1. Start by tying a piece of elastic exercise material to a doorknob. You can use surgical tubing or Thera-Band. (You may also hold one end of the band in each hand.)  2. Stand or sit with your shoulder relaxed and your elbow bent 90 degrees. Your upper arm should rest comfortably against your side. Squeeze a rolled towel between your elbow and your body for comfort. This will help keep your arm at your side. 3. Hold one end of the elastic band with the hand of the painful arm. 4. Start with your forearm across your belly. Slowly rotate the forearm out away from your body. Keep your elbow and upper arm tucked against the towel roll or the side of your body until you begin to feel tightness in your shoulder. Slowly move your arm back to where you started. 5. Repeat 8 to 12 times. Follow-up care is a key part of your treatment and safety. Be sure to make and go to all appointments, and call your doctor if you are having problems. It's also a good idea to know your test results and keep a list of the medicines you take. Where can you learn more? Go to https://[email or web address].COM.[employer]. org and sign in to your [employer] account. Enter Rosemary Martines in the [facility] box to learn more about \"Rotator Cuff: Exercises. \"     If you do not have an account, please click on the \"Sign Up Now\" link. Current as of: July 1, 2021               Content Version: 13.1  © 2006-2021 Healthwise, Incorporated. Care instructions adapted under license by [facility] ([facility]). If you have questions about a medical condition or this instruction, always ask your healthcare professional. [place] [address] any warranty or liability for your use of this information. Patient Education        Biceps Tendinitis: Exercises  Introduction  Here are some examples of exercises for you to try.  The exercises may be suggested for a condition or for rehabilitation. Start each exercise slowly. Ease off the exercises if you start to have pain. You will be told when to start these exercises and which ones will work best for you. How to do the exercises  Biceps stretch    1. Stand and hold your affected arm out to the side, with your hand at about hip level. 2. Gently bend your wrist back so that your fingers point down toward the floor. 3. You may also do this next to a wall and rest your fingers on the wall. 4. For more of a stretch, bend your head to the opposite side of your affected arm. 5. Hold for 15 to 30 seconds. 6. Repeat 2 to 4 times. Resisted supination    For this and the following exercises, you will need elastic exercise material, such as surgical tubing or Thera-Band. 1. Sit leaning forward with your legs slightly spread. Then place your forearm on your thigh with your hand and affected wrist in front of your knee. 2. Grasp one end of an exercise band with your palm down, and step on the other end. 3. Keeping your wrist straight, roll your palm outward and away from your thigh for a count of 2, then slowly move your wrist back to the starting position to a count of 5.  4. Repeat 8 to 12 times. Resisted elbow flexion    1. Using your affected arm, hold one end of an elastic band in your hand. 2. Place the other end of the band under your foot on the same side of your body as your affected arm. 3. Slowly bend your elbow and bring your hand toward your shoulder. Your palm and the underside of your wrist should be facing up as you pull the band toward your shoulder. Count to 2 as you pull up. 4. Relax and slowly return to your starting position. Count to 5 as you return to the start. 5. Repeat 8 to 12 times. Resisted elbow flexion at shoulder level    1. Tie the ends of an exercise band together to form a loop. Attach one end of the loop to a secure object or shut a door on it to hold it in place.  (Or you can have someone hold one end of the loop to provide resistance.) The band should be at shoulder level. 2. Stand facing where you have tied or fastened the band. 3. Start with your affected arm held out straight, holding the band in your hand. 4. Slowly bend your elbow to 90 degrees, bringing your hand toward your forehead. Count to 2 as you pull the band toward your head. 5. Relax and slowly return to your starting position. Count to 5 as you return to the start. 6. Repeat 8 to 12 times. Follow-up care is a key part of your treatment and safety. Be sure to make and go to all appointments, and call your doctor if you are having problems. It's also a good idea to know your test results and keep a list of the medicines you take. Where can you learn more? Go to https://[email or web address]amandaeb.[email or web address] org and sign in to your [employer] account. Enter K532 in the [employer] box to learn more about \"Biceps Tendinitis: Exercises. \"     If you do not have an account, please click on the \"Sign Up Now\" link. Current as of: July 1, 2021               Content Version: 13.1  © 2006-2021 Healthwise, Incorporated. Care instructions adapted under license by [facility] ([facility]). If you have questions about a medical condition or this instruction, always ask your healthcare professional. [address] any warranty or liability for your use of this information.

## 2022-01-11 NOTE — PROGRESS NOTES
Last 3 Encounters:   01/11/22 129/87   12/14/21 138/80   09/09/21 139/89     Wt Readings from Last 3 Encounters:   01/11/22 182 lb 6.4 oz (82.7 kg)   12/14/21 182 lb 3.2 oz (82.6 kg)   09/09/21 188 lb 3.2 oz (85.4 kg)   Rx: norvasc 5 mg daily, dyazide 37.5-25    MDD and DAKOTA  Rx: increased zoloft to 75 mg daily  So far, she feels the same, no better. Anxiety is actually getting worse given new life stressors, holidays and planning vacation to [facility]. Open to an increased dose. Acute right shoulder pain  2 weeks ago, noticed after waking up. Feels that she slept on it wrong but it has not gotten better  Has not tried anything treatment wise  No decreased range of motion, only soreness with extension and internal rotation, trying to lift arm past 90 degrees    Needs refill birth control    Itchy patch on  L ring finger is still there despite Lotrisone twice a day  She is open to trying a more potent steroid    Review of Systems   Constitutional: Negative for appetite change, chills, fatigue and fever. HENT: Negative for congestion. Eyes: Negative for pain and visual disturbance. Respiratory: Negative for cough and shortness of breath. Cardiovascular: Negative for chest pain and palpitations. Gastrointestinal: Negative for abdominal pain, constipation and diarrhea. Genitourinary: Negative for difficulty urinating. Musculoskeletal: Positive for arthralgias. Skin: Positive for rash. Negative for wound. Neurological: Negative for dizziness, weakness, light-headedness and headaches. Hematological: Does not bruise/bleed easily. Psychiatric/Behavioral: Positive for agitation. Negative for behavioral problems. The patient is nervous/anxious.         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    Hyperlipidemia     Kidney disease     Meniere's disease     Seizure ([facility] [address]     none since 2013  Trimalleolar fracture of ankle, closed, right, initial encounter 2/27/2018    Viral hepatitis        Family History   Problem Relation Age of Onset    High Cholesterol Father     Diabetes Paternal Grandmother     High Cholesterol Paternal Grandmother     Diabetes Paternal Grandfather     High Cholesterol Paternal Grandfather     Cancer Paternal Grandfather         colon    Arthritis Mother     Depression Mother     Depression Sister     Schizophrenia Sister     Heart Defect Brother     Mult Sclerosis Paternal Aunt        Social History     Tobacco Use    Smoking status: Never Smoker    Smokeless tobacco: Never Used   Vaping Use    Vaping Use: Never used   Substance Use Topics    Alcohol use: Yes     Alcohol/week: 0.0 standard drinks     Comment: rare celebratory    Drug use: Yes       New Prescriptions    CLOBETASOL (TEMOVATE) 0.05 % CREAM    Apply topically 2 times daily. MELOXICAM (MOBIC) 15 MG TABLET    Take 1 tablet by mouth daily       Meds Prior to visit:  Current Outpatient Medications on File Prior to Visit   Medication Sig Dispense Refill    vitamin D3 (CHOLECALCIFEROL) 25 MCG (1000 UT) TABS tablet Take 1 tablet by mouth daily 90 tablet 1    amLODIPine (NORVASC) 5 MG tablet TAKE 1 TABLET BY MOUTH EVERY DAY  30 tablet 11    albuterol sulfate  (90 Base) MCG/ACT inhaler INHALE 2 PUFFS BY MOUTH EVERY SIX HOURS AS NEEDED FOR WHEEZING 8.5 g 0    triamterene-hydroCHLOROthiazide (DYAZIDE) 37.5-25 MG per capsule TAKE 1 CAPSULE BY MOUTH EVERY DAY  90 capsule 2     No current facility-administered medications on file prior to visit.      Allergies   Allergen Reactions    Azithromycin Diarrhea    Cortisone Anaphylaxis     fulll blackout for 5 minutes, w/ smelling salts to come to consciousness    Food Anaphylaxis     Red onions    Gabapentin     Promethazine Other (See Comments)     Patient states siezures  SEIZURES    Keppra [Levetiracetam] Other (See Comments)     SEIZURES    Penicillins     Sulfa Antibiotics     Adhesive Tape Rash    Lamictal [Lamotrigine] Rash    Oxcarbazepine Rash       OBJECTIVE:  /87   Pulse 72   Temp 98.2 °F (36.8 °C) (Temporal)   Resp 16   Wt 182 lb 6.4 oz (82.7 kg)   LMP 12/22/2021   Breastfeeding No   BMI 30.35 kg/m²   BP Readings from Last 2 Encounters:   01/11/22 129/87   12/14/21 138/80     Wt Readings from Last 3 Encounters:   01/11/22 182 lb 6.4 oz (82.7 kg)   12/14/21 182 lb 3.2 oz (82.6 kg)   09/09/21 188 lb 3.2 oz (85.4 kg)       Physical Exam  Vitals reviewed. Constitutional:       General: She is not in acute distress. Appearance: Normal appearance. She is not ill-appearing. HENT:      Head: Normocephalic and atraumatic. Right Ear: External ear normal.      Left Ear: External ear normal.      Nose: Nose normal. No congestion. Mouth/Throat:      Mouth: Mucous membranes are moist.      Pharynx: Oropharynx is clear. No oropharyngeal exudate. Eyes:      Extraocular Movements: Extraocular movements intact. Conjunctiva/sclera: Conjunctivae normal.      Pupils: Pupils are equal, round, and reactive to light. Cardiovascular:      Rate and Rhythm: Normal rate and regular rhythm. Pulses: Normal pulses. Heart sounds: Normal heart sounds. Pulmonary:      Effort: Pulmonary effort is normal. No respiratory distress. Breath sounds: Normal breath sounds. No stridor. No wheezing, rhonchi or rales. Abdominal:      General: Bowel sounds are normal.      Palpations: Abdomen is soft. Tenderness: There is no abdominal tenderness. Musculoskeletal:         General: Normal range of motion. Cervical back: Normal range of motion and neck supple. Right lower leg: No edema. Left lower leg: No edema. Skin:     General: Skin is warm. Capillary Refill: Capillary refill takes less than 2 seconds. Findings: No erythema or rash. Neurological:      General: No focal deficit present. Mental Status: She is alert and oriented to person, place, and time. Mental status is at baseline. Motor: No weakness. Coordination: Coordination normal.      Gait: Gait normal.   Psychiatric:         Mood and Affect: Mood normal.         Behavior: Behavior normal.         Thought Content: Thought content normal.         Judgment: Judgment normal.         Lab Results   Component Value Date    WBC 10.4 07/01/2020    HGB 14.3 07/01/2020    HCT 43.3 07/01/2020    MCV 85.1 07/01/2020     (H) 07/01/2020     Lab Results   Component Value Date     03/03/2021    K 3.7 03/03/2021    CL 97 (L) 03/03/2021    CO2 25 03/03/2021    BUN 8 03/03/2021    CREATININE 0.6 03/03/2021    GLUCOSE 99 03/03/2021    CALCIUM 10.1 03/03/2021    PROT 7.5 03/03/2021    LABALBU 4.6 03/03/2021    BILITOT <0.2 03/03/2021    ALKPHOS 85 03/03/2021    AST 13 (L) 03/03/2021    ALT 10 03/03/2021    LABGLOM >60 03/03/2021    GFRAA >60 03/03/2021    AGRATIO 1.6 03/03/2021    GLOB 2.9 03/03/2021     Lab Results   Component Value Date    CHOL 204 (H) 07/01/2020    CHOL 228 (H) 10/31/2013    CHOL 215 (H) 03/30/2013     Lab Results   Component Value Date    TRIG 292 (H) 07/01/2020    TRIG 178 (H) 10/31/2013    TRIG 154 (H) 03/30/2013     Lab Results   Component Value Date    HDL 42 07/01/2020    HDL 56 10/31/2013    HDL 59 03/30/2013     Lab Results   Component Value Date    LDLCALC 104 (H) 07/01/2020    LDLCALC 136 (H) 10/31/2013    LDLCALC 125 (H) 03/30/2013     Lab Results   Component Value Date    LABVLDL 58 07/01/2020    LABVLDL 36 10/31/2013    LABVLDL 31 03/30/2013     Lab Results   Component Value Date    LABA1C 5.4 07/01/2020         Discussed use, benefit, and side effects of prescribed medications. Barriers to medication compliance addressed. All patient questions answered. Pt voiced understanding. RTC No follow-ups on file.     Future Appointments   Date Time Provider Abelardo Cardona   2/8/2022  9:00 AM Mariia BARAHONA MD [facility] [place] [facility]   3/7/2022 10:40 AM MD Hayley Pratida [facility]       Chuck Robbins MD  1/11/2022  12:35 PM

## 2022-02-08 ENCOUNTER — OFFICE VISIT (OUTPATIENT)
Dept: [facility] CLINIC | Age: 37
End: 2022-02-08
Payer: COMMERCIAL

## 2022-02-08 VITALS
TEMPERATURE: 98 F | DIASTOLIC BLOOD PRESSURE: 82 MMHG | HEART RATE: 75 BPM | WEIGHT: 182.4 LBS | BODY MASS INDEX: 30.35 KG/M2 | SYSTOLIC BLOOD PRESSURE: 135 MMHG

## 2022-02-08 DIAGNOSIS — G43.109 MIGRAINE WITH AURA AND WITHOUT STATUS MIGRAINOSUS, NOT INTRACTABLE: ICD-10-CM

## 2022-02-08 DIAGNOSIS — F51.02 ADJUSTMENT INSOMNIA: ICD-10-CM

## 2022-02-08 DIAGNOSIS — F41.1 GAD (GENERALIZED ANXIETY DISORDER): ICD-10-CM

## 2022-02-08 DIAGNOSIS — L30.9 DERMATITIS: ICD-10-CM

## 2022-02-08 DIAGNOSIS — I10 ESSENTIAL HYPERTENSION: Primary | ICD-10-CM

## 2022-02-08 DIAGNOSIS — F33.1 MAJOR DEPRESSIVE DISORDER, RECURRENT, MODERATE (HCC): ICD-10-CM

## 2022-02-08 DIAGNOSIS — M25.511 ACUTE PAIN OF RIGHT SHOULDER: ICD-10-CM

## 2022-02-08 DIAGNOSIS — J45.30 MILD PERSISTENT ASTHMA, UNCOMPLICATED: ICD-10-CM

## 2022-02-08 PROCEDURE — G8417 CALC BMI ABV UP PARAM F/U: HCPCS | Performed by: FAMILY MEDICINE

## 2022-02-08 PROCEDURE — 99214 OFFICE O/P EST MOD 30 MIN: CPT | Performed by: FAMILY MEDICINE

## 2022-02-08 PROCEDURE — G8427 DOCREV CUR MEDS BY ELIG CLIN: HCPCS | Performed by: FAMILY MEDICINE

## 2022-02-08 PROCEDURE — 1036F TOBACCO NON-USER: CPT | Performed by: FAMILY MEDICINE

## 2022-02-08 PROCEDURE — G8484 FLU IMMUNIZE NO ADMIN: HCPCS | Performed by: FAMILY MEDICINE

## 2022-02-08 RX ORDER — TRAZODONE HYDROCHLORIDE 50 MG/1
TABLET ORAL
Qty: 90 TABLET | Refills: 1 | Status: SHIPPED | OUTPATIENT
Start: 2022-02-08

## 2022-02-08 RX ORDER — SUMATRIPTAN 50 MG/1
50 TABLET, FILM COATED ORAL
Qty: 27 TABLET | Refills: 1 | Status: SHIPPED | OUTPATIENT
Start: 2022-02-08 | End: 2022-04-19

## 2022-02-08 ASSESSMENT — ENCOUNTER SYMPTOMS
EYE PAIN: 0
COUGH: 0
DIARRHEA: 0
ABDOMINAL PAIN: 0
CONSTIPATION: 0
SHORTNESS OF BREATH: 0

## 2022-02-08 NOTE — PROGRESS NOTES
Chief Complaint   Patient presents with    Shoulder Problem    Depression         ASSESSMENT/PLAN:  1. Essential hypertension  Controlled in triage  Continue medication regimen  Follow-up in 3 to 6 months, sooner if needed to continue to monitor. Labs up-to-date    2. Acute pain of right shoulder  Progressively improving over time  Continue home exercise program as well as ice as needed  Continue meloxicam daily as needed  If worsens, will follow up sooner    3. DAKOTA (generalized anxiety disorder)  Doing better after increasing Zoloft to 100 mg daily  Would like to keep it at this dose and follow-up in about 6 weeks, after her vacation    4. Major depressive disorder, recurrent, moderate (HCC)  Doing better after increasing Zoloft to 100 mg daily  Would like to keep it at this dose and follow-up in about 6 weeks, after her vacation    5. Dermatitis  Referral placed  Can continue Temovate or Lotrisone  Differential includes contact dermatitis versus fungal infection versus eczema  - Carmel Cancino MD, Dermatology, [facility]    6. Adjustment insomnia  We will try trazodone 50 to 100 mg nightly as needed for sleep  Follow-up in 6 weeks to gauge improvement  - traZODone (DESYREL) 50 MG tablet; Take 1-2 tablets 20-30 minutes before bed nightly as needed  Dispense: 90 tablet; Refill: 1    7. Migraine with aura and without status migrainosus, not intractable  May be secondary to increased stress versus sinus congestion versus lack of sleep  We will try sumatriptan  Discussed prognosis and duration of symptoms as well as side effects of medication  Follow-up in 6 weeks to gauge improvement  - SUMAtriptan (IMITREX) 50 MG tablet; Take 1 tablet by mouth once as needed for Migraine  Dispense: 27 tablet; Refill: 1    8. Mild persistent asthma, uncomplicated  Controlled per patient  Continue albuterol as needed         HPI:  Des Holden is a 31 y.o.  (: 1985) here today for mgmt of multiple issues    HTN  Rx: Triamterene-hydrochlorothiazide and amlodipine  Compliant: yes  Does not check BP at home. Lab Results   Component Value Date    CREATININE 0.6 03/03/2021     Pain of right shoulder is getting better  This tried some stretches at home and it is improving over time. She states it feels more like sore muscles low. Would like to hold off on physical therapy and imaging. No weakness, numbness or tingling or loss of range of motion  Meloxicam helps when she takes it    Regarding her anxiety and depression, this is doing better on Zoloft after increasing to 100 mg daily. Feels that there are a lot of things stressing her out the moment including getting her car fixed or getting a new car as well as vacation her family is going on next week. She is alone monitor lites very about. Because of the above, she is having difficulty sleeping and having more headaches  She is unable to get to sleep or stay asleep. Has tried melatonin without help or improvement. Has history of migraines and is continue with frequent on the left side, throbbing and causing some vision changes or light sensitivity. Ibuprofen and Tylenol not helping. Has even tried caffeine. Open to trying new medication    Dermatitis on finger since using the temovate cream has completely resolved and returned after cutting jalapeno. It also improved with the use of Lotrisone. Politely requesting a dermatology referral at this point. She would like to wear her wedding ring but is worried that it will continually flare a skin reaction on her finger. May be an allergy. She is breathing well. Asthma is controlled with albuterol as needed. Sometimes she will use it more on days that it is extremely colder she spends her time outside, otherwise doing well. Review of Systems   Constitutional: Negative for appetite change, chills, fatigue and fever. HENT: Negative for congestion. Eyes: Negative for pain and visual disturbance. Respiratory: Negative for cough and shortness of breath. Cardiovascular: Negative for chest pain and palpitations. Gastrointestinal: Negative for abdominal pain, constipation and diarrhea. Genitourinary: Negative for difficulty urinating. Musculoskeletal: Negative for arthralgias. Skin: Positive for rash. Negative for wound. Neurological: Positive for headaches. Negative for dizziness, weakness and light-headedness. Hematological: Does not bruise/bleed easily. Psychiatric/Behavioral: Positive for agitation and sleep disturbance. Negative for behavioral problems. The patient is nervous/anxious. 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    Hyperlipidemia     Kidney disease     Meniere's disease     Seizure ([facility] [address]     none since 2013    Trimalleolar fracture of ankle, closed, right, initial encounter 2/27/2018    Viral hepatitis        Family History   Problem Relation Age of Onset    High Cholesterol Father     Diabetes Paternal Grandmother     High Cholesterol Paternal Grandmother     Diabetes Paternal Grandfather     High Cholesterol Paternal Grandfather     Cancer Paternal Grandfather         colon    Arthritis Mother     Depression Mother     Depression Sister     Schizophrenia Sister     Heart Defect Brother     Mult Sclerosis Paternal Aunt        Social History     Tobacco Use    Smoking status: Never Smoker    Smokeless tobacco: Never Used   Vaping Use    Vaping Use: Never used   Substance Use Topics    Alcohol use:  Yes     Alcohol/week: 0.0 standard drinks     Comment: rare celebratory    Drug use: Yes       New Prescriptions    SUMATRIPTAN (IMITREX) 50 MG TABLET    Take 1 tablet by mouth once as needed for Migraine    TRAZODONE (DESYREL) 50 MG TABLET    Take 1-2 tablets 20-30 minutes before bed nightly as needed       Meds Prior to visit:  Current Outpatient Medications on File Prior to Visit   Medication Sig Dispense Refill    meloxicam (MOBIC) 15 MG tablet Take 1 tablet by mouth daily 90 tablet 1    sertraline (ZOLOFT) 100 MG tablet Take 1 tablet by mouth daily 90 tablet 3    norgestimate-ethinyl estradiol (ORTHO-CYCLEN) 0.25-35 MG-MCG per tablet TAKE 1 TABLET BY MOUTH EVERY DAY 84 tablet 0    clobetasol (TEMOVATE) 0.05 % cream Apply topically 2 times daily. 30 g 1    vitamin D3 (CHOLECALCIFEROL) 25 MCG (1000 UT) TABS tablet Take 1 tablet by mouth daily 90 tablet 1    amLODIPine (NORVASC) 5 MG tablet TAKE 1 TABLET BY MOUTH EVERY DAY  30 tablet 11    albuterol sulfate  (90 Base) MCG/ACT inhaler INHALE 2 PUFFS BY MOUTH EVERY SIX HOURS AS NEEDED FOR WHEEZING 8.5 g 0    triamterene-hydroCHLOROthiazide (DYAZIDE) 37.5-25 MG per capsule TAKE 1 CAPSULE BY MOUTH EVERY DAY  90 capsule 2     No current facility-administered medications on file prior to visit. Allergies   Allergen Reactions    Azithromycin Diarrhea    Cortisone Anaphylaxis     fulll blackout for 5 minutes, w/ smelling salts to come to consciousness    Food Anaphylaxis     Red onions    Gabapentin     Promethazine Other (See Comments)     Patient states siezures  SEIZURES    Keppra [Levetiracetam] Other (See Comments)     SEIZURES    Penicillins     Sulfa Antibiotics     Adhesive Tape Rash    Lamictal [Lamotrigine] Rash    Oxcarbazepine Rash       OBJECTIVE:  /82   Pulse 75   Temp 98 °F (36.7 °C)   Wt 182 lb 6.4 oz (82.7 kg)   LMP 02/07/2022   Breastfeeding No   BMI 30.35 kg/m²   BP Readings from Last 2 Encounters:   02/08/22 135/82   01/11/22 129/87     Wt Readings from Last 3 Encounters:   02/08/22 182 lb 6.4 oz (82.7 kg)   01/11/22 182 lb 6.4 oz (82.7 kg)   12/14/21 182 lb 3.2 oz (82.6 kg)       Physical Exam  Vitals reviewed. Constitutional:       General: She is not in acute distress. Appearance: Normal appearance. She is obese.  She is not ill-appearing. HENT:      Head: Normocephalic and atraumatic. Right Ear: Tympanic membrane, ear canal and external ear normal. There is no impacted cerumen. Left Ear: Tympanic membrane, ear canal and external ear normal. There is no impacted cerumen. Nose: Nose normal. No congestion. Mouth/Throat:      Mouth: Mucous membranes are moist.      Pharynx: Oropharynx is clear. No oropharyngeal exudate or posterior oropharyngeal erythema. Eyes:      Extraocular Movements: Extraocular movements intact. Conjunctiva/sclera: Conjunctivae normal.      Pupils: Pupils are equal, round, and reactive to light. Cardiovascular:      Rate and Rhythm: Normal rate and regular rhythm. Pulses: Normal pulses. Heart sounds: Normal heart sounds. Pulmonary:      Effort: Pulmonary effort is normal. No respiratory distress. Breath sounds: Normal breath sounds. No stridor. No wheezing, rhonchi or rales. Abdominal:      General: Bowel sounds are normal.      Palpations: Abdomen is soft. Tenderness: There is no abdominal tenderness. Musculoskeletal:         General: Normal range of motion. Cervical back: Normal range of motion and neck supple. Right lower leg: No edema. Left lower leg: No edema. Skin:     General: Skin is warm. Capillary Refill: Capillary refill takes less than 2 seconds. Findings: No erythema or rash. Neurological:      General: No focal deficit present. Mental Status: She is alert and oriented to person, place, and time. Mental status is at baseline. Motor: No weakness. Coordination: Coordination normal.      Gait: Gait normal.   Psychiatric:         Mood and Affect: Mood normal.         Behavior: Behavior normal.         Thought Content:  Thought content normal.         Judgment: Judgment normal.         Lab Results   Component Value Date    WBC 10.4 07/01/2020    HGB 14.3 07/01/2020    HCT 43.3 07/01/2020    MCV 85.1 07/01/2020  (H) 07/01/2020     Lab Results   Component Value Date     03/03/2021    K 3.7 03/03/2021    CL 97 (L) 03/03/2021    CO2 25 03/03/2021    BUN 8 03/03/2021    CREATININE 0.6 03/03/2021    GLUCOSE 99 03/03/2021    CALCIUM 10.1 03/03/2021    PROT 7.5 03/03/2021    LABALBU 4.6 03/03/2021    BILITOT <0.2 03/03/2021    ALKPHOS 85 03/03/2021    AST 13 (L) 03/03/2021    ALT 10 03/03/2021    LABGLOM >60 03/03/2021    GFRAA >60 03/03/2021    AGRATIO 1.6 03/03/2021    GLOB 2.9 03/03/2021     Lab Results   Component Value Date    CHOL 204 (H) 07/01/2020    CHOL 228 (H) 10/31/2013    CHOL 215 (H) 03/30/2013     Lab Results   Component Value Date    TRIG 292 (H) 07/01/2020    TRIG 178 (H) 10/31/2013    TRIG 154 (H) 03/30/2013     Lab Results   Component Value Date    HDL 42 07/01/2020    HDL 56 10/31/2013    HDL 59 03/30/2013     Lab Results   Component Value Date    LDLCALC 104 (H) 07/01/2020    LDLCALC 136 (H) 10/31/2013    LDLCALC 125 (H) 03/30/2013     Lab Results   Component Value Date    LABVLDL 58 07/01/2020    LABVLDL 36 10/31/2013    LABVLDL 31 03/30/2013     Lab Results   Component Value Date    LABA1C 5.4 07/01/2020         Discussed use, benefit, and side effects of prescribed medications. Barriers to medication compliance addressed. All patient questions answered. Pt voiced understanding. RTC Return in about 6 weeks (around 3/22/2022).     Future Appointments   Date Time Provider Abelardo Cardona   3[facility]7/2022 10:[facility]   3/22/2022  9:00 AM MD Gustavo Fuentes MD  2/8/2022  12:43 PM

## 2022-02-08 NOTE — Clinical Note
Hi there, Laurel Gayle and I have been trying to treat a dermatitis under her wedding ring. It has resolved with steroid creams and antifungal creams but keeps returning. Not time sensitive. Thank you!   Mariia

## 2022-02-25 DIAGNOSIS — I10 ESSENTIAL HYPERTENSION: ICD-10-CM

## 2022-02-25 RX ORDER — TRIAMTERENE AND HYDROCHLOROTHIAZIDE 37.5; 25 MG/1; MG/1
CAPSULE ORAL
Qty: 90 CAPSULE | Refills: 0 | Status: SHIPPED | OUTPATIENT
Start: 2022-02-25 | End: 2022-05-31

## 2022-02-25 NOTE — TELEPHONE ENCOUNTER
Medication:   Requested Prescriptions     Pending Prescriptions Disp Refills    triamterene-hydroCHLOROthiazide (DYAZIDE) 37.5-25 MG per capsule [Pharmacy Med Name: Triamterene-HCTZ Oral Capsule 37.5-25 MG] 90 capsule 0     Sig: TAKE 1 CAPSULE BY MOUTH EVERY DAY        Last Filled:      Patient Phone Number: [phone] (home)     Last appt: 2/8/2022   Next appt: 3/22/2022    Last OARRS:   RX Monitoring 10/9/2017   Attestation The Prescription Monitoring Report for this patient was reviewed today. Periodic Controlled Substance Monitoring No signs of potential drug abuse or diversion identified.

## 2022-03-07 ENCOUNTER — OFFICE VISIT (OUTPATIENT)
Dept: GYNECOLOGY | Age: 37
End: 2022-03-07
Payer: COMMERCIAL

## 2022-03-07 VITALS
BODY MASS INDEX: 30.74 KG/M2 | HEIGHT: 65 IN | HEART RATE: 70 BPM | SYSTOLIC BLOOD PRESSURE: 124 MMHG | WEIGHT: 184.5 LBS | DIASTOLIC BLOOD PRESSURE: 76 MMHG | RESPIRATION RATE: 17 BRPM

## 2022-03-07 DIAGNOSIS — Z01.419 WELL WOMAN EXAM WITH ROUTINE GYNECOLOGICAL EXAM: Primary | ICD-10-CM

## 2022-03-07 DIAGNOSIS — N85.2 ENLARGED UTERUS: ICD-10-CM

## 2022-03-07 PROCEDURE — G8484 FLU IMMUNIZE NO ADMIN: HCPCS | Performed by: OBSTETRICS & GYNECOLOGY

## 2022-03-07 PROCEDURE — 99385 PREV VISIT NEW AGE 18-39: CPT | Performed by: OBSTETRICS & GYNECOLOGY

## 2022-03-07 ASSESSMENT — ENCOUNTER SYMPTOMS
EYES NEGATIVE: 1
RESPIRATORY NEGATIVE: 1
GASTROINTESTINAL NEGATIVE: 1

## 2022-03-08 NOTE — PROGRESS NOTES
Subjective:      Patient ID: Eugenio Rodriguez is a 31 y.o. female. Patient is here for annual.       Review of Systems   Constitutional: Negative. HENT: Negative. Eyes: Negative. Respiratory: Negative. Cardiovascular: Negative. Gastrointestinal: Negative. Genitourinary: Negative. Musculoskeletal: Negative. Skin: Negative. Neurological: Negative. Psychiatric/Behavioral: Negative. Date of Birth 1985  Past Medical History:   Diagnosis Date    Asthma     Chicken pox     Degenerative disc disease, lumbar     Guillain Barré syndrome ([facility] [address] 2016    Hyperlipidemia     Kidney disease     Meniere's disease     Seizure ([facility] [address]     none since     Trimalleolar fracture of ankle, closed, right, initial encounter 2018    Viral hepatitis      Past Surgical History:   Procedure Laterality Date    ANKLE FRACTURE SURGERY Right 2018    orif trimalleolar -rods/pins    TONSILLECTOMY AND ADENOIDECTOMY      WISDOM TOOTH EXTRACTION       OB History    Para Term  AB Living   3 0 0 0 3 0   SAB IAB Ectopic Molar Multiple Live Births   3 0 0 0 0 0      # Outcome Date GA Lbr Victor Manuel/2nd Weight Sex Delivery Anes PTL Lv   3 SAB            2 SAB            1 SAB              Social History     Socioeconomic History    Marital status:      Spouse name: Not on file    Number of children: Not on file    Years of education: Not on file    Highest education level: Not on file   Occupational History    Not on file   Tobacco Use    Smoking status: Never Smoker    Smokeless tobacco: Never Used   Vaping Use    Vaping Use: Never used   Substance and Sexual Activity    Alcohol use: Yes     Alcohol/week: 0.0 standard drinks     Comment: rare celebratory    Drug use:  Yes    Sexual activity: Yes     Partners: Male     Birth control/protection: Pill   Other Topics Concern    Not on file   Social History Narrative    Not on file     Social Determinants of Health Financial Resource Strain:     Difficulty of Paying Living Expenses: Not on file   Food Insecurity:     Worried About Running Out of Food in the Last Year: Not on file    Alecia of Food in the Last Year: Not on file   Transportation Needs:     Lack of Transportation (Medical): Not on file    Lack of Transportation (Non-Medical):  Not on file   Physical Activity:     Days of Exercise per Week: Not on file    Minutes of Exercise per Session: Not on file   Stress:     Feeling of Stress : Not on file   Social Connections:     Frequency of Communication with Friends and Family: Not on file    Frequency of Social Gatherings with Friends and Family: Not on file    Attends Evangelical Services: Not on file    Active Member of [address] [place] [address] or Organizations: Not on file    Attends Club or Organization Meetings: Not on file    Marital Status: Not on file   Intimate Partner Violence:     Fear of Current or Ex-Partner: Not on file    Emotionally Abused: Not on file    Physically Abused: Not on file    Sexually Abused: Not on file   Housing Stability:     Unable to Pay for Housing in the Last Year: Not on file    Number of [place] in the Last Year: Not on file    Unstable Housing in the Last Year: Not on file     Allergies   Allergen Reactions    Azithromycin Diarrhea    Cortisone Anaphylaxis     fulll blackout for 5 minutes, w/ smelling salts to come to consciousness    Food Anaphylaxis     Red onions    Gabapentin Other (See Comments)     Suicidal thoughts    Keppra [Levetiracetam] Other (See Comments)     SEIZURES    Penicillins Anaphylaxis    Promethazine Other (See Comments)     Patient states siezures  SEIZURES    Sulfa Antibiotics Rash    Adhesive Tape Rash    Lamictal [Lamotrigine] Rash    Oxcarbazepine Rash     Outpatient Medications Marked as Taking for the 3/7/22 encounter (Office Visit) with Bon Morales MD   Medication Sig Dispense Refill    triamterene-hydroCHLOROthiazide (DYAZIDE) 37.5-25 MG per capsule TAKE 1 CAPSULE BY MOUTH EVERY DAY 90 capsule 0    traZODone (DESYREL) 50 MG tablet Take 1-2 tablets 20-30 minutes before bed nightly as needed 90 tablet 1    meloxicam (MOBIC) 15 MG tablet Take 1 tablet by mouth daily 90 tablet 1    sertraline (ZOLOFT) 100 MG tablet Take 1 tablet by mouth daily 90 tablet 3    norgestimate-ethinyl estradiol (ORTHO-CYCLEN) 0.25-35 MG-MCG per tablet TAKE 1 TABLET BY MOUTH EVERY DAY 84 tablet 0    clobetasol (TEMOVATE) 0.05 % cream Apply topically 2 times daily. 30 g 1    vitamin D3 (CHOLECALCIFEROL) 25 MCG (1000 UT) TABS tablet Take 1 tablet by mouth daily 90 tablet 1    amLODIPine (NORVASC) 5 MG tablet TAKE 1 TABLET BY MOUTH EVERY DAY  30 tablet 11    albuterol sulfate  (90 Base) MCG/ACT inhaler INHALE 2 PUFFS BY MOUTH EVERY SIX HOURS AS NEEDED FOR WHEEZING 8.5 g 0     Family History   Problem Relation Age of Onset    High Cholesterol Father     Diabetes Paternal Grandmother     High Cholesterol Paternal Grandmother     Diabetes Paternal Grandfather     High Cholesterol Paternal Grandfather     Cancer Paternal Grandfather         colon    Arthritis Mother     Depression Mother     Depression Sister     Schizophrenia Sister     Heart Defect Brother     Mult Sclerosis Paternal Aunt      /76 (Site: Right Upper Arm, Position: Sitting, Cuff Size: Medium Adult)   Pulse 70   Resp 17   Ht 5' 5\" (1.651 m)   Wt 184 lb 8 oz (83.7 kg)   LMP 02/07/2022   Breastfeeding No   BMI 30.70 kg/m²       Objective:   Physical Exam  Constitutional:       Appearance: Normal appearance. She is well-developed and normal weight. HENT:      Head: Normocephalic. Nose: Nose normal.      Mouth/Throat:      Mouth: Mucous membranes are moist.      Pharynx: Oropharynx is clear. Eyes:      Pupils: Pupils are equal, round, and reactive to light. Neck:      Thyroid: No thyromegaly.    Cardiovascular:      Rate and Rhythm: Normal rate and regular rhythm. Pulses: Normal pulses. Heart sounds: Normal heart sounds. No murmur heard. No friction rub. No gallop. Pulmonary:      Effort: Pulmonary effort is normal. No respiratory distress. Breath sounds: Normal breath sounds. No stridor. No wheezing, rhonchi or rales. Chest:      Chest wall: No tenderness. Breasts:      Right: Normal. No swelling, bleeding, inverted nipple, mass, nipple discharge, skin change or tenderness. Left: Normal. No swelling, bleeding, inverted nipple, mass, nipple discharge, skin change or tenderness. Abdominal:      General: Bowel sounds are normal. There is no distension. Palpations: Abdomen is soft. There is no mass. Tenderness: There is no abdominal tenderness. There is no guarding or rebound. Hernia: No hernia is present. There is no hernia in the left inguinal area. Genitourinary:     General: Normal vulva. Exam position: Lithotomy position. Pubic Area: No rash. Labia:         Right: No rash, tenderness, lesion or injury. Left: No rash, tenderness, lesion or injury. Urethra: No prolapse, urethral pain, urethral swelling or urethral lesion. Vagina: No signs of injury and foreign body. No vaginal discharge, erythema, tenderness, bleeding, lesions or prolapsed vaginal walls. Cervix: No cervical motion tenderness, discharge, friability, lesion, erythema, cervical bleeding or eversion. Uterus: Enlarged. Not deviated, not fixed and not tender. Adnexa:         Right: No mass, tenderness or fullness. Left: No mass, tenderness or fullness. Rectum: No anal fissure or external hemorrhoid. Comments: Normal urethral meatus, normal urethra, nl bladder    Slightly enlarged uterus. Musculoskeletal:         General: No tenderness. Normal range of motion. Cervical back: Normal range of motion and neck supple. No rigidity.    Lymphadenopathy:      Cervical: No cervical adenopathy. Lower Body: No right inguinal adenopathy. No left inguinal adenopathy. Skin:     General: Skin is warm and dry. Coloration: Skin is not pale. Findings: No erythema or rash. Neurological:      General: No focal deficit present. Mental Status: She is alert and oriented to person, place, and time. Mental status is at baseline. Deep Tendon Reflexes: Reflexes are normal and symmetric. Psychiatric:         Mood and Affect: Mood normal.         Behavior: Behavior normal.         Thought Content: Thought content normal.         Judgment: Judgment normal.         Assessment:      1. Annual  2. Slightly enlarged uterus      Plan:      1. Pap, calcium, exercise  2. Pelvic US-could just be from position of uterus but check for fibroids.          Ignacio Fuentes MD

## 2022-03-18 LAB
HPV COMMENT: NORMAL
HPV TYPE 16: NOT DETECTED
HPV TYPE 18: NOT DETECTED
HPVOH (OTHER TYPES): NOT DETECTED

## 2022-03-22 ENCOUNTER — OFFICE VISIT (OUTPATIENT)
Dept: [facility] CLINIC | Age: 37
End: 2022-03-22
Payer: COMMERCIAL

## 2022-03-22 VITALS
HEART RATE: 87 BPM | DIASTOLIC BLOOD PRESSURE: 94 MMHG | BODY MASS INDEX: 31.15 KG/M2 | WEIGHT: 187.2 LBS | SYSTOLIC BLOOD PRESSURE: 156 MMHG | TEMPERATURE: 98 F

## 2022-03-22 DIAGNOSIS — F41.1 GAD (GENERALIZED ANXIETY DISORDER): ICD-10-CM

## 2022-03-22 DIAGNOSIS — I10 ESSENTIAL HYPERTENSION: ICD-10-CM

## 2022-03-22 DIAGNOSIS — F51.02 ADJUSTMENT INSOMNIA: ICD-10-CM

## 2022-03-22 DIAGNOSIS — E66.09 CLASS 1 OBESITY DUE TO EXCESS CALORIES WITH SERIOUS COMORBIDITY AND BODY MASS INDEX (BMI) OF 31.0 TO 31.9 IN ADULT: ICD-10-CM

## 2022-03-22 DIAGNOSIS — I10 ESSENTIAL HYPERTENSION: Primary | ICD-10-CM

## 2022-03-22 DIAGNOSIS — Z86.19 HISTORY OF VIRAL HEPATITIS: ICD-10-CM

## 2022-03-22 DIAGNOSIS — R10.11 RUQ PAIN: ICD-10-CM

## 2022-03-22 LAB
A/G RATIO: 1.8 (ref 1.1–2.2)
ALBUMIN SERPL-MCNC: 4.8 G/DL (ref 3.4–5)
ALP BLD-CCNC: 94 U/L (ref 40–129)
ALT SERPL-CCNC: 10 U/L (ref 10–40)
ANION GAP SERPL CALCULATED.3IONS-SCNC: 17 MMOL/L (ref 3–16)
AST SERPL-CCNC: 13 U/L (ref 15–37)
BASOPHILS ABSOLUTE: 0.1 K/UL (ref 0–0.2)
BASOPHILS RELATIVE PERCENT: 0.7 %
BILIRUB SERPL-MCNC: <0.2 MG/DL (ref 0–1)
BUN BLDV-MCNC: 8 MG/DL (ref 7–20)
CALCIUM SERPL-MCNC: 10.2 MG/DL (ref 8.3–10.6)
CHLORIDE BLD-SCNC: 97 MMOL/L (ref 99–110)
CO2: 24 MMOL/L (ref 21–32)
CREAT SERPL-MCNC: 0.5 MG/DL (ref 0.6–1.1)
EOSINOPHILS ABSOLUTE: 0.3 K/UL (ref 0–0.6)
EOSINOPHILS RELATIVE PERCENT: 3.1 %
GFR AFRICAN AMERICAN: >60
GFR NON-AFRICAN AMERICAN: >60
GLUCOSE BLD-MCNC: 111 MG/DL (ref 70–99)
HAV IGM SER IA-ACNC: NORMAL
HCT VFR BLD CALC: 44.8 % (ref 36–48)
HEMOGLOBIN: 15.1 G/DL (ref 12–16)
HEPATITIS B CORE IGM ANTIBODY: NORMAL
HEPATITIS B SURFACE ANTIGEN INTERPRETATION: NORMAL
HEPATITIS C ANTIBODY INTERPRETATION: NORMAL
LYMPHOCYTES ABSOLUTE: 3.1 K/UL (ref 1–5.1)
LYMPHOCYTES RELATIVE PERCENT: 29.6 %
MCH RBC QN AUTO: 28.9 PG (ref 26–34)
MCHC RBC AUTO-ENTMCNC: 33.6 G/DL (ref 31–36)
MCV RBC AUTO: 86 FL (ref 80–100)
MONOCYTES ABSOLUTE: 0.5 K/UL (ref 0–1.3)
MONOCYTES RELATIVE PERCENT: 4.4 %
NEUTROPHILS ABSOLUTE: 6.5 K/UL (ref 1.7–7.7)
NEUTROPHILS RELATIVE PERCENT: 62.2 %
PDW BLD-RTO: 13.6 % (ref 12.4–15.4)
PLATELET # BLD: 466 K/UL (ref 135–450)
PMV BLD AUTO: 8 FL (ref 5–10.5)
POTASSIUM SERPL-SCNC: 4.3 MMOL/L (ref 3.5–5.1)
RBC # BLD: 5.21 M/UL (ref 4–5.2)
SODIUM BLD-SCNC: 138 MMOL/L (ref 136–145)
TOTAL PROTEIN: 7.5 G/DL (ref 6.4–8.2)
TSH REFLEX: 2.16 UIU/ML (ref 0.27–4.2)
WBC # BLD: 10.4 K/UL (ref 4–11)

## 2022-03-22 PROCEDURE — G8427 DOCREV CUR MEDS BY ELIG CLIN: HCPCS | Performed by: FAMILY MEDICINE

## 2022-03-22 PROCEDURE — 1036F TOBACCO NON-USER: CPT | Performed by: FAMILY MEDICINE

## 2022-03-22 PROCEDURE — G8417 CALC BMI ABV UP PARAM F/U: HCPCS | Performed by: FAMILY MEDICINE

## 2022-03-22 PROCEDURE — 96127 BRIEF EMOTIONAL/BEHAV ASSMT: CPT | Performed by: FAMILY MEDICINE

## 2022-03-22 PROCEDURE — G8484 FLU IMMUNIZE NO ADMIN: HCPCS | Performed by: FAMILY MEDICINE

## 2022-03-22 PROCEDURE — 99214 OFFICE O/P EST MOD 30 MIN: CPT | Performed by: FAMILY MEDICINE

## 2022-03-22 ASSESSMENT — ANXIETY QUESTIONNAIRES
5. BEING SO RESTLESS THAT IT IS HARD TO SIT STILL: 1-SEVERAL DAYS
1. FEELING NERVOUS, ANXIOUS, OR ON EDGE: 2-OVER HALF THE DAYS
6. BECOMING EASILY ANNOYED OR IRRITABLE: 3-NEARLY EVERY DAY
2. NOT BEING ABLE TO STOP OR CONTROL WORRYING: 2-OVER HALF THE DAYS
4. TROUBLE RELAXING: 2-OVER HALF THE DAYS
GAD7 TOTAL SCORE: 11
3. WORRYING TOO MUCH ABOUT DIFFERENT THINGS: 1-SEVERAL DAYS
7. FEELING AFRAID AS IF SOMETHING AWFUL MIGHT HAPPEN: 0-NOT AT ALL

## 2022-03-22 ASSESSMENT — ENCOUNTER SYMPTOMS
EYE PAIN: 0
DIARRHEA: 0
CONSTIPATION: 0
COUGH: 0
VOMITING: 0
SHORTNESS OF BREATH: 0
ABDOMINAL PAIN: 1
NAUSEA: 0

## 2022-03-22 ASSESSMENT — PATIENT HEALTH QUESTIONNAIRE - PHQ9
SUM OF ALL RESPONSES TO PHQ QUESTIONS 1-9: 0
1. LITTLE INTEREST OR PLEASURE IN DOING THINGS: 0
SUM OF ALL RESPONSES TO PHQ QUESTIONS 1-9: 0
SUM OF ALL RESPONSES TO PHQ QUESTIONS 1-9: 0
2. FEELING DOWN, DEPRESSED OR HOPELESS: 0
SUM OF ALL RESPONSES TO PHQ9 QUESTIONS 1 & 2: 0
SUM OF ALL RESPONSES TO PHQ QUESTIONS 1-9: 0

## 2022-03-22 NOTE — PROGRESS NOTES
DAKOTA 7 SCORE 3/22/2022   DAKOTA-7 Total Score 11     Interpretation of DAKOTA-7 score: 5-9 = mild anxiety, 10-14 = moderate anxiety, 15+ = severe anxiety. Recommend referral to behavioral health for scores 10 or greater.

## 2022-03-22 NOTE — PROGRESS NOTES
Chief Complaint   Patient presents with    Hypertension    Insomnia    Anxiety         ASSESSMENT/PLAN:  1. Essential hypertension  Elevated in triage but in pain  Continue med mgmt and follow up in 2 months to monitor  Update labs  - CBC with Auto Differential; Future  - Comprehensive Metabolic Panel; Future    2. DAKOTA (generalized anxiety disorder)  Update DAKOTA and interpreted. Rule out thyroid abnormality  Follow up in 2 months to continue to monitor  Work on getting more sleep as well as sleep hygiene   Continue zoloft 100 mg daily  - TSH with Reflex; Future  - [place] BEHAV ASSMT W/SCORE & DOCD/STAND INSTRUMENT    3. Class 1 obesity due to excess calories with serious comorbidity and body mass index (BMI) of 31.0 to 14.6 in adult  Complicating all aspects of patients care  Discussed dietary choices   Follow up labs  - TSH with Reflex; Future  - Hemoglobin A1C; Future    4. History of viral hepatitis  Update labs  - Comprehensive Metabolic Panel; Future  - Hepatitis Panel, Acute; Future  - HIV Screen; Future    5. RUQ pain  Get US and labs to rule out structural or lab abnormality  Follow up and treat accordingly  - US GALLBLADDER RUQ; Future    6. Adjustment insomnia  Discussed trying trazodone on a weekend first to give her piece of mind. Also discussed MOA and half life of medication. HPI:  Jesse Escalona is a 30 y.o. (: 1985) here today for chronic condition mgmt. Currently taking zoloft 100 mg and feeling ok  DAKOTA 7 SCORE 3/22/2022   DAKOTA-7 Total Score 11   Interpretation of DAKOTA-7 score: 5-9 = mild anxiety, 10-14 = moderate anxiety, 15+ = severe anxiety. Recommend referral to behavioral health for scores 10 or greater. PHQ-9 Total Score: 0 (3/22/2022  8:52 AM)    Migraines have been stable. Has not had a chance to try imitrex yet. Insomnia is still an issue and she has not tried trazodone yet. She will soon.  Just worried it will make her groggy in the AM. Discussed trying it on a weekend first to give her piece of mind. Also discussed MOA and half life of medication. Her main concern is her wt gain and RUQ pain. Has had this before when she was diagnosed with viral hepatitis. She is tired and has a pain when she is hugry or when she eats. BM are light, smelly and float. HTN  Rx: dyazide and norvasc  Compliant: yes  Does not check BP at home. Lab Results   Component Value Date    CREATININE 0.6 03/03/2021     BP Readings from Last 3 Encounters:   03/22/22 (!) 156/94   03/07/22 124/76   02/08/22 135/82   Usually controlled but having abd pain. Review of Systems   Constitutional: Positive for fatigue. Negative for appetite change, chills and fever. HENT: Negative for congestion. Eyes: Negative for pain and visual disturbance. Respiratory: Negative for cough and shortness of breath. Cardiovascular: Negative for chest pain and palpitations. Gastrointestinal: Positive for abdominal pain. Negative for constipation, diarrhea, nausea and vomiting. Genitourinary: Negative for difficulty urinating. Musculoskeletal: Negative for arthralgias. Skin: Negative for rash and wound. Neurological: Negative for dizziness, weakness, light-headedness and headaches. Hematological: Does not bruise/bleed easily. Psychiatric/Behavioral: Positive for behavioral problems and sleep disturbance. The patient is nervous/anxious.         Past Medical History:   Diagnosis Date    Asthma     Chicken pox     Degenerative disc disease, lumbar     Guillain Barré syndrome ([facility] [address] 8/4/2016    Hyperlipidemia     Kidney disease     Meniere's disease     Seizure ([facility] [address]     none since 2013    Trimalleolar fracture of ankle, closed, right, initial encounter 2/27/2018    Viral hepatitis        Family History   Problem Relation Age of Onset    High Cholesterol Father     Diabetes Paternal Grandmother     High Cholesterol Paternal Grandmother     Diabetes Paternal [employer] [employer] High Cholesterol Paternal Grandfather     Cancer Paternal Grandfather         colon    Arthritis Mother     Depression Mother     Depression Sister     Schizophrenia Sister     Heart Defect Brother     Mult Sclerosis Paternal Aunt        Social History     Tobacco Use    Smoking status: Never Smoker    Smokeless tobacco: Never Used   Vaping Use    Vaping Use: Never used   Substance Use Topics    Alcohol use: Yes     Alcohol/week: 0.0 standard drinks     Comment: rare celebratory    Drug use: Yes       New Prescriptions    No medications on file       Meds Prior to visit:  Current Outpatient Medications on File Prior to Visit   Medication Sig Dispense Refill    triamterene-hydroCHLOROthiazide (DYAZIDE) 37.5-25 MG per capsule TAKE 1 CAPSULE BY MOUTH EVERY DAY 90 capsule 0    traZODone (DESYREL) 50 MG tablet Take 1-2 tablets 20-30 minutes before bed nightly as needed 90 tablet 1    SUMAtriptan (IMITREX) 50 MG tablet Take 1 tablet by mouth once as needed for Migraine 27 tablet 1    meloxicam (MOBIC) 15 MG tablet Take 1 tablet by mouth daily 90 tablet 1    sertraline (ZOLOFT) 100 MG tablet Take 1 tablet by mouth daily 90 tablet 3    norgestimate-ethinyl estradiol (ORTHO-CYCLEN) 0.25-35 MG-MCG per tablet TAKE 1 TABLET BY MOUTH EVERY DAY 84 tablet 0    clobetasol (TEMOVATE) 0.05 % cream Apply topically 2 times daily. 30 g 1    vitamin D3 (CHOLECALCIFEROL) 25 MCG (1000 UT) TABS tablet Take 1 tablet by mouth daily 90 tablet 1    amLODIPine (NORVASC) 5 MG tablet TAKE 1 TABLET BY MOUTH EVERY DAY  30 tablet 11    albuterol sulfate  (90 Base) MCG/ACT inhaler INHALE 2 PUFFS BY MOUTH EVERY SIX HOURS AS NEEDED FOR WHEEZING 8.5 g 0     No current facility-administered medications on file prior to visit.      Allergies   Allergen Reactions    Azithromycin Diarrhea    Cortisone Anaphylaxis     fulll blackout for 5 minutes, w/ smelling salts to come to consciousness    Food Anaphylaxis     Red onions    Gabapentin Other (See Comments)     Suicidal thoughts    Keppra [Levetiracetam] Other (See Comments)     SEIZURES    Penicillins Anaphylaxis    Promethazine Other (See Comments)     Patient states siezures  SEIZURES    Sulfa Antibiotics Rash    Adhesive Tape Rash    Lamictal [Lamotrigine] Rash    Oxcarbazepine Rash       OBJECTIVE:  BP (!) 156/94   Pulse 87   Temp 98 °F (36.7 °C) (Temporal)   Wt 187 lb 3.2 oz (84.9 kg)   LMP 03/08/2022   BMI 31.15 kg/m²   BP Readings from Last 2 Encounters:   03/22/22 (!) 156/94   03/07/22 124/76     Wt Readings from Last 3 Encounters:   03/22/22 187 lb 3.2 oz (84.9 kg)   03/07/22 184 lb 8 oz (83.7 kg)   02/08/22 182 lb 6.4 oz (82.7 kg)       Physical Exam  Vitals reviewed. Constitutional:       General: She is not in acute distress. Appearance: Normal appearance. She is obese. She is not ill-appearing. HENT:      Head: Normocephalic and atraumatic. Right Ear: External ear normal.      Left Ear: External ear normal.      Nose: Nose normal. No congestion. Mouth/Throat:      Mouth: Mucous membranes are moist.      Pharynx: Oropharynx is clear. No oropharyngeal exudate. Eyes:      Extraocular Movements: Extraocular movements intact. Conjunctiva/sclera: Conjunctivae normal.      Pupils: Pupils are equal, round, and reactive to light. Cardiovascular:      Rate and Rhythm: Normal rate and regular rhythm. Pulses: Normal pulses. Heart sounds: Normal heart sounds. Pulmonary:      Effort: Pulmonary effort is normal. No respiratory distress. Breath sounds: Normal breath sounds. No stridor. No wheezing, rhonchi or rales. Abdominal:      General: Bowel sounds are normal. There is no distension. Palpations: Abdomen is soft. There is no mass. Tenderness: There is abdominal tenderness. There is guarding. There is no rebound. Hernia: No hernia is present. Musculoskeletal:         General: Normal range of motion.       Cervical back: Normal range of motion and neck supple. Right lower leg: No edema. Left lower leg: No edema. Skin:     General: Skin is warm. Capillary Refill: Capillary refill takes less than 2 seconds. Findings: No erythema or rash. Neurological:      General: No focal deficit present. Mental Status: She is alert and oriented to person, place, and time. Mental status is at baseline. Motor: No weakness. Coordination: Coordination normal.      Gait: Gait normal.      Deep Tendon Reflexes: Reflexes normal.   Psychiatric:         Mood and Affect: Mood normal.         Behavior: Behavior normal.         Thought Content:  Thought content normal.         Judgment: Judgment normal.         Lab Results   Component Value Date    WBC 10.4 07/01/2020    HGB 14.3 07/01/2020    HCT 43.3 07/01/2020    MCV 85.1 07/01/2020     (H) 07/01/2020     Lab Results   Component Value Date     03/03/2021    K 3.7 03/03/2021    CL 97 (L) 03/03/2021    CO2 25 03/03/2021    BUN 8 03/03/2021    CREATININE 0.6 03/03/2021    GLUCOSE 99 03/03/2021    CALCIUM 10.1 03/03/2021    PROT 7.5 03/03/2021    LABALBU 4.6 03/03/2021    BILITOT <0.2 03/03/2021    ALKPHOS 85 03/03/2021    AST 13 (L) 03/03/2021    ALT 10 03/03/2021    LABGLOM >60 03/03/2021    GFRAA >60 03/03/2021    AGRATIO 1.6 03/03/2021    GLOB 2.9 03/03/2021     Lab Results   Component Value Date    CHOL 204 (H) 07/01/2020    CHOL 228 (H) 10/31/2013    CHOL 215 (H) 03/30/2013     Lab Results   Component Value Date    TRIG 292 (H) 07/01/2020    TRIG 178 (H) 10/31/2013    TRIG 154 (H) 03/30/2013     Lab Results   Component Value Date    HDL 42 07/01/2020    HDL 56 10/31/2013    HDL 59 03/30/2013     Lab Results   Component Value Date    LDLCALC 104 (H) 07/01/2020    LDLCALC 136 (H) 10/31/2013    LDLCALC 125 (H) 03/30/2013     Lab Results   Component Value Date    LABVLDL 58 07/01/2020    LABVLDL 36 10/31/2013    LABVLDL 31 03/30/2013     Lab Results Component Value Date    LABA1C 5.4 07/01/2020         Discussed use, benefit, and side effects of prescribed medications. Barriers to medication compliance addressed. All patient questions answered. Pt voiced understanding. RTC Return in about 4 weeks (around 4/19/2022).     Future Appointments   Date Time Provider Abelardo Cardona   4/19/2022  9:00 AM MD Marko David MD  3/22/2022  9:29 AM

## 2022-03-23 LAB
ESTIMATED AVERAGE GLUCOSE: 111.2 MG/DL
HBA1C MFR BLD: 5.5 %
HIV AG/AB: NORMAL
HIV ANTIGEN: NORMAL
HIV-1 ANTIBODY: NORMAL
HIV-2 AB: NORMAL

## 2022-03-24 ENCOUNTER — HOSPITAL ENCOUNTER (OUTPATIENT)
Dept: ULTRASOUND IMAGING | Age: 37
Discharge: HOME | End: 2022-03-24
Payer: COMMERCIAL

## 2022-03-24 DIAGNOSIS — R10.11 RUQ PAIN: ICD-10-CM

## 2022-03-24 DIAGNOSIS — N85.2 ENLARGED UTERUS: ICD-10-CM

## 2022-03-24 PROCEDURE — 76830 TRANSVAGINAL US NON-OB: CPT

## 2022-03-24 PROCEDURE — 76856 US EXAM PELVIC COMPLETE: CPT

## 2022-03-24 PROCEDURE — 76705 ECHO EXAM OF ABDOMEN: CPT

## 2022-04-19 ENCOUNTER — TELEMEDICINE (OUTPATIENT)
Dept: [facility] CLINIC | Age: 37
End: 2022-04-19
Payer: COMMERCIAL

## 2022-04-19 DIAGNOSIS — F33.1 MAJOR DEPRESSIVE DISORDER, RECURRENT, MODERATE (HCC): ICD-10-CM

## 2022-04-19 DIAGNOSIS — F41.1 GAD (GENERALIZED ANXIETY DISORDER): ICD-10-CM

## 2022-04-19 DIAGNOSIS — R10.11 RUQ PAIN: Primary | ICD-10-CM

## 2022-04-19 DIAGNOSIS — E66.09 CLASS 1 OBESITY DUE TO EXCESS CALORIES WITH SERIOUS COMORBIDITY AND BODY MASS INDEX (BMI) OF 31.0 TO 31.9 IN ADULT: ICD-10-CM

## 2022-04-19 PROCEDURE — 1036F TOBACCO NON-USER: CPT | Performed by: FAMILY MEDICINE

## 2022-04-19 PROCEDURE — 99214 OFFICE O/P EST MOD 30 MIN: CPT | Performed by: FAMILY MEDICINE

## 2022-04-19 PROCEDURE — G8417 CALC BMI ABV UP PARAM F/U: HCPCS | Performed by: FAMILY MEDICINE

## 2022-04-19 PROCEDURE — 96127 BRIEF EMOTIONAL/BEHAV ASSMT: CPT | Performed by: FAMILY MEDICINE

## 2022-04-19 PROCEDURE — G8427 DOCREV CUR MEDS BY ELIG CLIN: HCPCS | Performed by: FAMILY MEDICINE

## 2022-04-19 ASSESSMENT — PATIENT HEALTH QUESTIONNAIRE - PHQ9
1. LITTLE INTEREST OR PLEASURE IN DOING THINGS: 0
6. FEELING BAD ABOUT YOURSELF - OR THAT YOU ARE A FAILURE OR HAVE LET YOURSELF OR YOUR FAMILY DOWN: 1
SUM OF ALL RESPONSES TO PHQ QUESTIONS 1-9: 4
10. IF YOU CHECKED OFF ANY PROBLEMS, HOW DIFFICULT HAVE THESE PROBLEMS MADE IT FOR YOU TO DO YOUR WORK, TAKE CARE OF THINGS AT HOME, OR GET ALONG WITH OTHER PEOPLE: 1
SUM OF ALL RESPONSES TO PHQ QUESTIONS 1-9: 4
4. FEELING TIRED OR HAVING LITTLE ENERGY: 1
5. POOR APPETITE OR OVEREATING: 1
8. MOVING OR SPEAKING SO SLOWLY THAT OTHER PEOPLE COULD HAVE NOTICED. OR THE OPPOSITE, BEING SO FIGETY OR RESTLESS THAT YOU HAVE BEEN MOVING AROUND A LOT MORE THAN USUAL: 0
7. TROUBLE CONCENTRATING ON THINGS, SUCH AS READING THE NEWSPAPER OR WATCHING TELEVISION: 0
3. TROUBLE FALLING OR STAYING ASLEEP: 1
9. THOUGHTS THAT YOU WOULD BE BETTER OFF DEAD, OR OF HURTING YOURSELF: 0
SUM OF ALL RESPONSES TO PHQ QUESTIONS 1-9: 4
SUM OF ALL RESPONSES TO PHQ QUESTIONS 1-9: 4
SUM OF ALL RESPONSES TO PHQ9 QUESTIONS 1 & 2: 0
2. FEELING DOWN, DEPRESSED OR HOPELESS: 0

## 2022-04-19 ASSESSMENT — ANXIETY QUESTIONNAIRES
5. BEING SO RESTLESS THAT IT IS HARD TO SIT STILL: 0
GAD7 TOTAL SCORE: 4
2. NOT BEING ABLE TO STOP OR CONTROL WORRYING: 0
IF YOU CHECKED OFF ANY PROBLEMS ON THIS QUESTIONNAIRE, HOW DIFFICULT HAVE THESE PROBLEMS MADE IT FOR YOU TO DO YOUR WORK, TAKE CARE OF THINGS AT HOME, OR GET ALONG WITH OTHER PEOPLE: SOMEWHAT DIFFICULT
7. FEELING AFRAID AS IF SOMETHING AWFUL MIGHT HAPPEN: 0
1. FEELING NERVOUS, ANXIOUS, OR ON EDGE: 1
6. BECOMING EASILY ANNOYED OR IRRITABLE: 1
3. WORRYING TOO MUCH ABOUT DIFFERENT THINGS: 1
4. TROUBLE RELAXING: 1

## 2022-04-19 NOTE — PROGRESS NOTES
Rebeca Norman is a [address] y.o. female evaluated via telephone on 4/19/2022 for     Documentation:  I communicated with the patient and/or health care decision maker about RUQ abdominal pain, anxiety and depression, weight mgmt. DAKOTA 7 SCORE 4/19/2022 3/22/2022   DAKOTA-7 Total Score 4 -   DAKOTA-7 Total Score - 11   Interpretation of DAKOTA-7 score: 5-9 = mild anxiety, 10-14 = moderate anxiety, 15+ = severe anxiety. Recommend referral to behavioral health for scores 10 or greater. PHQ-9 Total Score: 4 (4/19/2022  9:38 AM)  Thoughts that you would be better off dead, or of hurting yourself in some way: 0 (4/19/2022  9:38 AM)    Details of this discussion including any medical advice provided:   1. RUQ pain  Has resolved and is no longer bothering her. She has changed her diet; less red meat and dairy and less processed foods. Feels better after dietary changes    2. Class 1 obesity due to excess calories with serious comorbidity and body mass index (BMI) of 31.0 to 31.9 in adult  Has not weighted herself yet  She has only recently started her dietary mods and doesn't feel like she lost weight yet. Will follow up in 2-3 months to continue to monitor. Continue lifestyle changes for now  Complicating all aspects of patient's care, adebayo HTN. 3. DAKOTA (generalized anxiety disorder)  Updated DAKOTA and interpreted. Dealing with her son's new diagnosis and this is stressful. Still having trouble sleeping on account of stressors but she states it is manageable   Follow up in 2-3 months to continue to monitor and titrate med accordingly  Continue Zoloft 100 mg daily  - [place] BEHAV ASSMT W/SCORE & DOCD/STAND INSTRUMENT    4. Major depressive disorder, recurrent, moderate (HCC)  Updated PHQ and interpreted. Dealing with her son's new diagnosis and this is stressful.    Still having trouble sleeping on account of stressors but she states it is manageable   Follow up in 2-3 months to continue to monitor and titrate med accordingly  Continue Zoloft 100 mg daily  - [place] BEHAV ASSMT W/SCORE & DOCD/STAND INSTRUMENT    Total Time: minutes: 21-30 minutes    Danika Body was evaluated through a synchronous (real-time) audio encounter. Patient identification was verified at the start of the visit. She (or guardian if applicable) is aware that this is a billable service, which includes applicable co-pays. This visit was conducted with the patient's (and/or legal guardian's) verbal consent. She has not had a related appointment within my department in the past 7 days or scheduled within the next 24 hours. The patient was located in a state where the provider was licensed to provide care.     Note: not billable if this call serves to triage the patient into an appointment for the relevant concern    Future Appointments   Date Time Provider Abelardo Dulce   9/8/2022  9:00 MD Daren Harden MD

## 2022-05-05 DIAGNOSIS — Z78.9 USES BIRTH CONTROL: ICD-10-CM

## 2022-05-05 RX ORDER — NORGESTIMATE AND ETHINYL ESTRADIOL 0.25-0.035
KIT ORAL
Qty: 84 TABLET | Refills: 0 | Status: CANCELLED | OUTPATIENT
Start: 2022-05-05

## 2022-05-05 RX ORDER — NORGESTIMATE AND ETHINYL ESTRADIOL 0.25-0.035
KIT ORAL
Qty: 84 TABLET | Refills: 3 | Status: SHIPPED | OUTPATIENT
Start: 2022-05-05

## 2022-05-05 NOTE — TELEPHONE ENCOUNTER
Medication:   Requested Prescriptions     Pending Prescriptions Disp Refills    norgestimate-ethinyl estradiol (ORTHO-CYCLEN) 0.25-35 MG-MCG per tablet 84 tablet 0     Sig: TAKE 1 TABLET BY MOUTH EVERY DAY        Last Filled:  01/11/22    Patient Phone Number: [phone] (home)     Last appt: 4/19/2022 Return in about 3 months (around 7/19/2022). Next appt: Visit date not found    Last OARRS:   RX Monitoring 10/9/2017   Attestation The Prescription Monitoring Report for this patient was reviewed today. Periodic Controlled Substance Monitoring No signs of potential drug abuse or diversion identified.

## 2022-05-31 DIAGNOSIS — I10 ESSENTIAL HYPERTENSION: ICD-10-CM

## 2022-05-31 RX ORDER — MELATONIN
Qty: 90 TABLET | Refills: 0 | Status: SHIPPED | OUTPATIENT
Start: 2022-05-31 | End: 2022-08-22

## 2022-05-31 RX ORDER — TRIAMTERENE AND HYDROCHLOROTHIAZIDE 37.5; 25 MG/1; MG/1
CAPSULE ORAL
Qty: 90 CAPSULE | Refills: 0 | Status: SHIPPED | OUTPATIENT
Start: 2022-05-31 | End: 2022-09-07

## 2022-05-31 NOTE — TELEPHONE ENCOUNTER
Medication:   Requested Prescriptions     Pending Prescriptions Disp Refills    vitamin D3 (CHOLECALCIFEROL) 25 MCG (1000 UT) TABS tablet [Pharmacy Med Name: Vitamin D3 Oral Tablet 25 MCG (1000 UT)] 90 tablet 0     Sig: TAKE 1 TABLET BY MOUTH EVERY DAY    triamterene-hydroCHLOROthiazide (DYAZIDE) 37.5-25 MG per capsule [Pharmacy Med Name: Triamterene-HCTZ Oral Capsule 37.5-25 MG] 90 capsule 0     Sig: TAKE 1 CAPSULE BY MOUTH EVERY DAY        Last Filled:      Patient Phone Number: [phone] (home)     Last appt: 4/19/2022   Next appt: Visit date not found    Last OARRS:   RX Monitoring 10/9/2017   Attestation The Prescription Monitoring Report for this patient was reviewed today. Periodic Controlled Substance Monitoring No signs of potential drug abuse or diversion identified.

## 2022-08-22 RX ORDER — MELATONIN
Qty: 90 TABLET | Refills: 0 | Status: SHIPPED | OUTPATIENT
Start: 2022-08-22

## 2022-08-22 NOTE — TELEPHONE ENCOUNTER
Medication:   Requested Prescriptions     Pending Prescriptions Disp Refills    vitamin D3 (CHOLECALCIFEROL) 25 MCG (1000 UT) TABS tablet [Pharmacy Med Name: Vitamin D3 Oral Tablet 25 MCG (1000 UT)] 90 tablet 0     Sig: TAKE 1 TABLET BY MOUTH EVERY DAY        Last Filled:  5/31/22    Patient Phone Number: [phone] (home)     Last appt: 4/19/2022   Next appt: Visit date not found    Last OARRS:   RX Monitoring 10/9/2017   Attestation The Prescription Monitoring Report for this patient was reviewed today. Periodic Controlled Substance Monitoring No signs of potential drug abuse or diversion identified.

## 2022-09-07 DIAGNOSIS — I10 ESSENTIAL HYPERTENSION: ICD-10-CM

## 2022-09-07 RX ORDER — TRIAMTERENE AND HYDROCHLOROTHIAZIDE 37.5; 25 MG/1; MG/1
CAPSULE ORAL
Qty: 90 CAPSULE | Refills: 0 | Status: SHIPPED | OUTPATIENT
Start: 2022-09-07

## 2022-09-07 NOTE — TELEPHONE ENCOUNTER
Medication:   Requested Prescriptions     Pending Prescriptions Disp Refills    triamterene-hydroCHLOROthiazide (DYAZIDE) 37.5-25 MG per capsule [Pharmacy Med Name: Triamterene-HCTZ Oral Capsule 37.5-25 MG] 90 capsule 0     Sig: TAKE 1 CAPSULE BY MOUTH EVERY DAY        Last Filled:  05/31/22    Patient Phone Number: [phone] (home)     Last appt: 4/19/2022   Next appt: Visit date not found    Last OARRS:   RX Monitoring 10/9/2017   Attestation The Prescription Monitoring Report for this patient was reviewed today. Periodic Controlled Substance Monitoring No signs of potential drug abuse or diversion identified.

## 2022-09-08 ENCOUNTER — OFFICE VISIT (OUTPATIENT)
Dept: GYNECOLOGY | Age: 37
End: 2022-09-08
Payer: COMMERCIAL

## 2022-09-08 VITALS
WEIGHT: 198 LBS | HEIGHT: 65 IN | HEART RATE: 76 BPM | BODY MASS INDEX: 32.99 KG/M2 | OXYGEN SATURATION: 99 % | DIASTOLIC BLOOD PRESSURE: 77 MMHG | RESPIRATION RATE: 17 BRPM | SYSTOLIC BLOOD PRESSURE: 128 MMHG

## 2022-09-08 DIAGNOSIS — R87.610 ASCUS OF CERVIX WITH NEGATIVE HIGH RISK HPV: ICD-10-CM

## 2022-09-08 PROCEDURE — 99213 OFFICE O/P EST LOW 20 MIN: CPT | Performed by: OBSTETRICS & GYNECOLOGY

## 2022-09-08 PROCEDURE — 1036F TOBACCO NON-USER: CPT | Performed by: OBSTETRICS & GYNECOLOGY

## 2022-09-08 PROCEDURE — G8427 DOCREV CUR MEDS BY ELIG CLIN: HCPCS | Performed by: OBSTETRICS & GYNECOLOGY

## 2022-09-08 PROCEDURE — G8417 CALC BMI ABV UP PARAM F/U: HCPCS | Performed by: OBSTETRICS & GYNECOLOGY

## 2022-09-10 ASSESSMENT — ENCOUNTER SYMPTOMS
RESPIRATORY NEGATIVE: 1
GASTROINTESTINAL NEGATIVE: 1
ALLERGIC/IMMUNOLOGIC NEGATIVE: 1
EYES NEGATIVE: 1

## 2022-09-10 NOTE — PROGRESS NOTES
[address] [place] [address]  Dept: [phone]  Dept Fax: [phone]  Loc: [phone]     2022    Stan Shaikh (:  1985) is a 30 y.o. female, here for evaluation of the following medical concerns:    Patient is here for atypical pap smear and negative HPV. Review of Systems   Constitutional: Negative. HENT: Negative. Eyes: Negative. Respiratory: Negative. Cardiovascular: Negative. Gastrointestinal: Negative. Endocrine: Negative. Genitourinary: Negative. Musculoskeletal: Negative. Skin: Negative. Allergic/Immunologic: Negative. Neurological: Negative. Hematological: Negative. Psychiatric/Behavioral: Negative.        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[facility] [address] 2016    Hyperlipidemia     Kidney disease     Meniere's disease     Seizure ([facility] [address]     none since     Trimalleolar fracture of ankle, closed, right, initial encounter 2018    Viral hepatitis      Past Surgical History:   Procedure Laterality Date    ANKLE FRACTURE SURGERY Right 2018    orif trimalleolar -rods/pins    TONSILLECTOMY AND ADENOIDECTOMY      WISDOM TOOTH EXTRACTION       OB History    Para Term  AB Living   3 0 0 0 3 0   SAB IAB Ectopic Molar Multiple Live Births   3 0 0 0 0 0      # Outcome Date GA Lbr Victor Manuel/2nd Weight Sex Delivery Anes PTL Lv   3 SAB            2 SAB            1 SAB              Social History     Socioeconomic History    Marital status:      Spouse name: Not on file    Number of children: Not on file    Years of education: Not on file    Highest education level: Not on file   Occupational History    Not on file   Tobacco Use    Smoking status: Never    Smokeless tobacco: Never   Vaping Use    Vaping Use: Never used   Substance and Sexual Activity    Alcohol use: Yes     Alcohol/week: 0.0 standard drinks     Comment: rare celebratory    Drug use: Yes    Sexual activity: Yes     Partners: Male     Birth control/protection: Pill   Other Topics Concern    Not on file   Social History Narrative    Not on file     Social Determinants of Health     Financial Resource Strain: Not on file   Food Insecurity: Not on file   Transportation Needs: Not on file   Physical Activity: Not on file   Stress: Not on file   Social Connections: Not on file   Intimate Partner Violence: Not on file   Housing Stability: Not on file     Allergies   Allergen Reactions    Azithromycin Diarrhea    Cortisone Anaphylaxis     fulll blackout for 5 minutes, w/ smelling salts to come to consciousness    Food Anaphylaxis     Red onions    Gabapentin Other (See Comments)     Suicidal thoughts    Keppra [Levetiracetam] Other (See Comments)     SEIZURES    Penicillins Anaphylaxis    Promethazine Other (See Comments)     Patient states siezures  SEIZURES    Sulfa Antibiotics Rash    Adhesive Tape Rash    Lamictal [Lamotrigine] Rash    Oxcarbazepine Rash     No outpatient medications have been marked as taking for the 9/8/22 encounter (Office Visit) with Tim Vang MD.     Family History   Problem Relation Age of Onset    High Cholesterol Father     Diabetes Paternal Grandmother     High Cholesterol Paternal Grandmother     Diabetes Paternal Grandfather     High Cholesterol Paternal Grandfather     Cancer Paternal Grandfather         colon    Arthritis Mother     Depression Mother     Depression Sister     Schizophrenia Sister     Heart Defect Brother     Mult Sclerosis Paternal Aunt      /77 (Site: Right Upper Arm, Position: Sitting, Cuff Size: Large Adult)   Pulse 76   Resp 17   Ht 5' 5\" (1.651 m)   Wt 198 lb (89.8 kg)   LMP 08/25/2022   SpO2 99%   BMI 32.95 kg/m²       Prior to Visit Medications    Medication Sig Taking?  Authorizing Provider triamterene-hydroCHLOROthiazide (DYAZIDE) 37.5-25 MG per capsule TAKE 1 CAPSULE BY MOUTH EVERY DAY  Dione Cortez MD   vitamin D3 (CHOLECALCIFEROL) 25 MCG (1000 UT) TABS tablet TAKE 1 TABLET BY MOUTH EVERY DAY  Dione Cortez MD   norgestimate-ethinyl estradiol (ORTHO-CYCLEN) 0.25-35 MG-MCG per tablet TAKE 1 TABLET BY MOUTH EVERY DAY  Rebecca Rivera MD   traZODone (DESYREL) 50 MG tablet Take 1-2 tablets 20-30 minutes before bed nightly as needed  Dione Cortez MD   SUMAtriptan (IMITREX) 50 MG tablet Take 1 tablet by mouth once as needed for Migraine  Dione Cortez MD   meloxicam (MOBIC) 15 MG tablet Take 1 tablet by mouth daily  Dione Cortez MD   sertraline (ZOLOFT) 100 MG tablet Take 1 tablet by mouth daily  Dione Cortez MD   clobetasol (TEMOVATE) 0.05 % cream Apply topically 2 times daily.   Dione Cortez MD   amLODIPine (NORVASC) 5 MG tablet TAKE 1 TABLET BY MOUTH EVERY DAY   Dione Cortez MD   albuterol sulfate  (90 Base) MCG/ACT inhaler INHALE 2 PUFFS BY MOUTH EVERY SIX HOURS AS NEEDED FOR WHEEZING  Dione Cortez MD        Allergies   Allergen Reactions    Azithromycin Diarrhea    Cortisone Anaphylaxis     fulll blackout for 5 minutes, w/ smelling salts to come to consciousness    Food Anaphylaxis     Red onions    Gabapentin Other (See Comments)     Suicidal thoughts    Keppra [Levetiracetam] Other (See Comments)     SEIZURES    Penicillins Anaphylaxis    Promethazine Other (See Comments)     Patient states siezures  SEIZURES    Sulfa Antibiotics Rash    Adhesive Tape Rash    Lamictal [Lamotrigine] Rash    Oxcarbazepine Rash       Past Medical History:   Diagnosis Date    Asthma     Chicken pox     Degenerative disc disease, lumbar     Guillain Barré syndrome ([facility] [address] 8/4/2016    Hyperlipidemia     Kidney disease     Meniere's disease     Seizure ([facility] [address]     none since 2013    Trimalleolar fracture of ankle, closed, right, initial encounter 2/27/2018 of this encounter: 198 lb (89.8 kg). Physical Exam  Constitutional:       General: She is not in acute distress. Appearance: She is well-developed. She is not diaphoretic. HENT:      Head: Normocephalic. Eyes:      Extraocular Movements: Extraocular movements intact. Abdominal:      General: There is no distension. Palpations: Abdomen is soft. There is no mass. Tenderness: There is no abdominal tenderness. There is no guarding or rebound. Genitourinary:     General: Normal vulva. Exam position: Lithotomy position. Labia:         Right: No rash, tenderness, lesion or injury. Left: No rash, tenderness, lesion or injury. Urethra: No prolapse, urethral pain, urethral swelling or urethral lesion. Vagina: No signs of injury and foreign body. No vaginal discharge, erythema, tenderness, bleeding, lesions or prolapsed vaginal walls. Cervix: No cervical motion tenderness, discharge, friability, lesion, erythema, cervical bleeding or eversion. Uterus: Not deviated, not enlarged, not fixed, not tender and no uterine prolapse. Adnexa:         Right: No mass, tenderness or fullness. Left: No mass, tenderness or fullness. Rectum: No external hemorrhoid. Comments: Normal urethral meatus, nl urethra, nl bladder. Musculoskeletal:         General: No tenderness. Normal range of motion. Cervical back: Normal range of motion. Skin:     General: Skin is warm and dry. Coloration: Skin is not pale. Findings: No erythema or rash. Neurological:      Mental Status: She is alert and oriented to person, place, and time. Psychiatric:         Behavior: Behavior normal.         Thought Content: Thought content normal.         Judgment: Judgment normal.       ASSESSMENT/PLAN:  1. ASCUS of cervix with negative high risk HPV  - PAP SMEAR      No follow-ups on file.

## 2022-09-21 DIAGNOSIS — M25.511 ACUTE PAIN OF RIGHT SHOULDER: ICD-10-CM

## 2022-09-21 DIAGNOSIS — I10 ESSENTIAL HYPERTENSION: ICD-10-CM

## 2022-09-21 RX ORDER — MELOXICAM 15 MG/1
TABLET ORAL
Qty: 90 TABLET | Refills: 0 | Status: SHIPPED | OUTPATIENT
Start: 2022-09-21

## 2022-09-21 NOTE — TELEPHONE ENCOUNTER
Medication:   Requested Prescriptions     Pending Prescriptions Disp Refills    meloxicam (MOBIC) 15 MG tablet [Pharmacy Med Name: Meloxicam Oral Tablet 15 MG] 90 tablet 0     Sig: TAKE 1 TABLET BY MOUTH EVERY DAY        Last Filled:  01/11/22    Patient Phone Number: [phone] (home)     Last appt: 4/19/2022 Return in about 3 months (around 7/19/2022). Next appt: Visit date not found    Last OARRS:   RX Monitoring 10/9/2017   Attestation The Prescription Monitoring Report for this patient was reviewed today. Periodic Controlled Substance Monitoring No signs of potential drug abuse or diversion identified.

## 2022-09-22 DIAGNOSIS — I10 ESSENTIAL HYPERTENSION: ICD-10-CM

## 2022-09-22 RX ORDER — AMLODIPINE BESYLATE 5 MG/1
TABLET ORAL
Qty: 30 TABLET | Refills: 11 | OUTPATIENT
Start: 2022-09-22

## 2022-09-22 NOTE — TELEPHONE ENCOUNTER
Medication:   Requested Prescriptions     Pending Prescriptions Disp Refills    amLODIPine (NORVASC) 5 MG tablet 30 tablet 11        Last Filled:      Patient Phone Number: [phone] (home)     Last appt: 4/19/2022   Next appt: Visit date not found    Last OARRS:   RX Monitoring 10/9/2017   Attestation The Prescription Monitoring Report for this patient was reviewed today. Periodic Controlled Substance Monitoring No signs of potential drug abuse or diversion identified.

## 2022-09-22 NOTE — TELEPHONE ENCOUNTER
Medication:   Requested Prescriptions     Pending Prescriptions Disp Refills    amLODIPine (NORVASC) 5 MG tablet 30 tablet 11        Last Filled:      Patient Phone Number: [phone] (home)     Last appt: 4/19/2022   Next appt: Visit date not found    Last OARRS:   RX Monitoring 10/9/2017   Attestation The Prescription Monitoring Report for this patient was reviewed today. Periodic Controlled Substance Monitoring No signs of potential drug abuse or diversion identified.

## 2022-09-27 ENCOUNTER — PATIENT MESSAGE (OUTPATIENT)
Dept: [facility] CLINIC | Age: 37
End: 2022-09-27

## 2022-09-27 DIAGNOSIS — I10 ESSENTIAL HYPERTENSION: ICD-10-CM

## 2022-09-27 RX ORDER — AMLODIPINE BESYLATE 5 MG/1
TABLET ORAL
Qty: 30 TABLET | Refills: 0 | Status: SHIPPED | OUTPATIENT
Start: 2022-09-27 | End: 2022-10-21

## 2022-09-27 NOTE — TELEPHONE ENCOUNTER
Medication:   Requested Prescriptions     Pending Prescriptions Disp Refills    amLODIPine (NORVASC) 5 MG tablet 30 tablet 0     Sig: TAKE 1 TABLET BY MOUTH EVERY DAY        Last Filled:  09/21/21    Patient Phone Number: [phone] (home)     Last appt: 4/19/2022   Next appt: 10/4/2022    Last OARRS:   RX Monitoring 10/9/2017   Attestation The Prescription Monitoring Report for this patient was reviewed today. Periodic Controlled Substance Monitoring No signs of potential drug abuse or diversion identified.

## 2022-09-27 NOTE — TELEPHONE ENCOUNTER
From: Steve Acuna  To: Dr. Charlee Damon: 9/27/2022 8:43 AM EDT  Subject: Amlodipine    I tried to refill my blood pressure medication the other day and was told I needed to make an appt. I now have one scheduled for the 4th, but am out of the amlodipine. Would you be able to send that in, please? Thank you!

## 2022-10-14 NOTE — OP NOTE
[address] [address]                                 OPERATIVE REPORT    PATIENT NAME: Luma Kumar                     :        1985  MED REC NO:   [id number]                          ROOM:  ACCOUNT NO:   [de-identified]                          ADMIT DATE: 2018  PROVIDER:     Wing Quiana Dpm    DATE OF PROCEDURE:  2018    PREOPERATIVE DIAGNOSIS:  Bimalleolar ankle fracture, right ankle. POSTOPERATIVE DIAGNOSIS:  Bimalleolar ankle fracture, right ankle. OPERATION PERFORMED:  Bimalleolar ankle fracture open reduction and  internal fixation, right ankle. ANESTHESIA:  General.    OPERATIVE INDICATIONS:  This patient presented to my outpatient clinic this  past Tuesday complaining of severe pain to her right ankle. The weekend  prior she had tripped off the edge of a curve and twisted her right ankle. Subsequent x-rays demonstrated a displaced bimalleolar ankle fracture. Closed reduction was performed in the ER which placed the ankle joint back  into anatomic alignment. The decision for internal fixation was then made  in the office. OPERATIVE PROCEDURE:  The patient was brought to the operating room and  placed in supine position with an IV intact for sedation. General  anesthesia was provided by the Anesthesia Department of [facility]. The decision to forgo a sciatic nerve block was made by  anesthesia due to her past issues with Guillain-Olla syndrome. The  patient's right medial ankle was anesthetized using 10 mL of 0.5% Marcaine  plain. The patient's right lower leg was then prepped and draped in the  usual aseptic manner. The lower leg was exsanguinated and a pneumatic  tourniquet at the thigh was inflated for hemostasis. Attention was then directed to the medial aspect of her ankle were  superficial abrasion were noted, but no signs of infection.   [facility] [address] [facility] [address] [facility]  [address] [place] [id number]  Dept: [phone]  Dept Fax: [phone]     10/14/2022      Med Laws   Jocelin Javierence, DO linear  incision was carried overlying the medial malleolus. This was deepened  down to the level of the fracture being careful to avoid all neurovascular  structures. They were either Bovied or retracted. The periosteal incision  was made delivering the fracture plainly into view. The fracture was then  manipulated back into anatomic alignment. Direct visualization of the  fracture demonstrated anatomic alignment at the ankle joint. Two K-wires  were then used to temporarily fixate the fracture. Next, the fracture was  viewed under mini C-arm and anatomic alignment of the ankle joint was  noted. The fracture was then fixated with two 4.0 x 15 mm screws. The  purchase of the screw into the bone was felt to be excellent. The wound  was then copiously irrigated with normal saline. The periosteal tissue was  repaired using 2-0 Vicryl and the skin was repaired using 4-0 nylon. Attention was then directed to the lateral aspect of the ankle, where again  no signs of infection or of lesions were noted. The fracture was then  identified using mini C-arm. A small incision was placed directly over the  fracture site of distal fibula. This was deepened down to the level of the  fracture being careful to avoid of all neurovascular structures. They were  either Bovied or retracted. The fracture was identified after periosteal  incision. The foot was then gently distracted distally to pull out the  fracture to length. This was then temporarily fixated with a bone clamp  and a point-to-point clamp. Next, a K-wire was directed from the tip of  this fibula up the canal of the fibula. The canal was then prepared via  manufacture technique to accept a 3.0 x 130 mm Arthrex FibuLock  intramedullary nail. The position was then checked on a C-arm and  excellent restoration of the fibula length was noted. At this point, a  bone forceps was applied to the distal aspect of the fibula and a lateral  force was applied.

## 2022-10-21 DIAGNOSIS — I10 ESSENTIAL HYPERTENSION: ICD-10-CM

## 2022-10-21 RX ORDER — AMLODIPINE BESYLATE 5 MG/1
TABLET ORAL
Qty: 30 TABLET | Refills: 0 | Status: SHIPPED | OUTPATIENT
Start: 2022-10-21

## 2022-10-21 NOTE — TELEPHONE ENCOUNTER
Medication:   Requested Prescriptions     Pending Prescriptions Disp Refills    amLODIPine (NORVASC) 5 MG tablet [Pharmacy Med Name: amLODIPine Besylate Oral Tablet 5 MG] 30 tablet 0     Sig: TAKE 1 TABLET BY MOUTH EVERY DAY       Last Filled:      Patient Phone Number: [phone] (home)     Last appt: 4/19/2022   Next appt: Visit date not found  No follow-ups on file.   Last PSA: No results found for: PSA

## 2022-11-22 DIAGNOSIS — I10 ESSENTIAL HYPERTENSION: ICD-10-CM

## 2022-11-22 RX ORDER — AMLODIPINE BESYLATE 5 MG/1
TABLET ORAL
Qty: 30 TABLET | Refills: 0 | Status: SHIPPED | OUTPATIENT
Start: 2022-11-22

## 2022-11-22 RX ORDER — MELATONIN
Qty: 90 TABLET | Refills: 0 | Status: SHIPPED | OUTPATIENT
Start: 2022-11-22

## 2022-11-22 NOTE — TELEPHONE ENCOUNTER
Medication:   Requested Prescriptions     Pending Prescriptions Disp Refills    amLODIPine (NORVASC) 5 MG tablet [Pharmacy Med Name: amLODIPine Besylate Oral Tablet 5 MG] 30 tablet 0     Sig: TAKE 1 TABLET BY MOUTH EVERY DAY    vitamin D3 (CHOLECALCIFEROL) 25 MCG (1000 UT) TABS tablet [Pharmacy Med Name: Vitamin D3 Oral Tablet 25 MCG (1000 UT)] 90 tablet 0     Sig: TAKE 1 TABLET BY MOUTH EVERY DAY       Last Filled:      Patient Phone Number: [phone] (home)     Last appt: 4/19/2022   Next appt: Visit date not found      No follow-ups on file.   Last PSA: No results found for: PSA

## 2022-12-12 ENCOUNTER — APPOINTMENT (OUTPATIENT)
Dept: CT IMAGING | Age: 37
DRG: 263 | End: 2022-12-12
Payer: COMMERCIAL

## 2022-12-12 ENCOUNTER — HOSPITAL ENCOUNTER (INPATIENT)
Age: 37
LOS: 2 days | Discharge: HOME | DRG: 263 | End: 2022-12-14
Attending: EMERGENCY MEDICINE | Admitting: SURGERY
Payer: COMMERCIAL

## 2022-12-12 DIAGNOSIS — K81.0 ACUTE CHOLECYSTITIS: ICD-10-CM

## 2022-12-12 DIAGNOSIS — R11.2 NAUSEA AND VOMITING, UNSPECIFIED VOMITING TYPE: ICD-10-CM

## 2022-12-12 DIAGNOSIS — R10.11 RIGHT UPPER QUADRANT ABDOMINAL PAIN: Primary | ICD-10-CM

## 2022-12-12 LAB
A/G RATIO: 1.5 (ref 1.1–2.2)
ALBUMIN SERPL-MCNC: 4.4 G/DL (ref 3.4–5)
ALP BLD-CCNC: 77 U/L (ref 40–129)
ALT SERPL-CCNC: 11 U/L (ref 10–40)
AMORPHOUS: ABNORMAL /HPF
ANION GAP SERPL CALCULATED.3IONS-SCNC: 16 MMOL/L (ref 3–16)
AST SERPL-CCNC: 14 U/L (ref 15–37)
BACTERIA: ABNORMAL /HPF
BASOPHILS ABSOLUTE: 0.1 K/UL (ref 0–0.2)
BASOPHILS RELATIVE PERCENT: 0.3 %
BILIRUB SERPL-MCNC: <0.2 MG/DL (ref 0–1)
BILIRUBIN URINE: NEGATIVE
BLOOD, URINE: ABNORMAL
BUN BLDV-MCNC: 13 MG/DL (ref 7–20)
CALCIUM SERPL-MCNC: 8.6 MG/DL (ref 8.3–10.6)
CHLORIDE BLD-SCNC: 100 MMOL/L (ref 99–110)
CLARITY: ABNORMAL
CO2: 18 MMOL/L (ref 21–32)
COLOR: YELLOW
CREAT SERPL-MCNC: <0.5 MG/DL (ref 0.6–1.1)
EOSINOPHILS ABSOLUTE: 0.1 K/UL (ref 0–0.6)
EOSINOPHILS RELATIVE PERCENT: 0.8 %
GFR SERPL CREATININE-BSD FRML MDRD: >60 ML/MIN/{1.73_M2}
GLUCOSE BLD-MCNC: 105 MG/DL (ref 70–99)
GLUCOSE URINE: NEGATIVE MG/DL
HCG QUALITATIVE: NEGATIVE
HCT VFR BLD CALC: 44.8 % (ref 36–48)
HEMOGLOBIN: 15.5 G/DL (ref 12–16)
KETONES, URINE: 15 MG/DL
LEUKOCYTE ESTERASE, URINE: NEGATIVE
LIPASE: 33 U/L (ref 13–60)
LYMPHOCYTES ABSOLUTE: 0.6 K/UL (ref 1–5.1)
LYMPHOCYTES RELATIVE PERCENT: 3.6 %
MCH RBC QN AUTO: 29.1 PG (ref 26–34)
MCHC RBC AUTO-ENTMCNC: 34.6 G/DL (ref 31–36)
MCV RBC AUTO: 84.1 FL (ref 80–100)
MICROSCOPIC EXAMINATION: YES
MONOCYTES ABSOLUTE: 0.3 K/UL (ref 0–1.3)
MONOCYTES RELATIVE PERCENT: 1.6 %
NEUTROPHILS ABSOLUTE: 15.3 K/UL (ref 1.7–7.7)
NEUTROPHILS RELATIVE PERCENT: 93.7 %
NITRITE, URINE: NEGATIVE
PDW BLD-RTO: 13.4 % (ref 12.4–15.4)
PH UA: 6.5 (ref 5–8)
PLATELET # BLD: 415 K/UL (ref 135–450)
PMV BLD AUTO: 7.6 FL (ref 5–10.5)
POTASSIUM REFLEX MAGNESIUM: 3.6 MMOL/L (ref 3.5–5.1)
PROTEIN UA: 100 MG/DL
RBC # BLD: 5.32 M/UL (ref 4–5.2)
RBC UA: ABNORMAL /HPF (ref 0–4)
RENAL EPITHELIAL, UA: ABNORMAL /HPF (ref 0–1)
SODIUM BLD-SCNC: 134 MMOL/L (ref 136–145)
SPECIFIC GRAVITY UA: 1.02 (ref 1–1.03)
TOTAL PROTEIN: 7.4 G/DL (ref 6.4–8.2)
URINE REFLEX TO CULTURE: ABNORMAL
URINE TYPE: ABNORMAL
UROBILINOGEN, URINE: 0.2 E.U./DL
WBC # BLD: 16.3 K/UL (ref 4–11)
WBC UA: ABNORMAL /HPF (ref 0–5)

## 2022-12-12 PROCEDURE — 96374 THER/PROPH/DIAG INJ IV PUSH: CPT

## 2022-12-12 PROCEDURE — 87324 CLOSTRIDIUM AG IA: CPT

## 2022-12-12 PROCEDURE — 6360000002 HC RX W HCPCS: Performed by: STUDENT IN AN ORGANIZED HEALTH CARE EDUCATION/TRAINING PROGRAM

## 2022-12-12 PROCEDURE — 1200000000 HC SEMI PRIVATE

## 2022-12-12 PROCEDURE — 36415 COLL VENOUS BLD VENIPUNCTURE: CPT

## 2022-12-12 PROCEDURE — 96375 TX/PRO/DX INJ NEW DRUG ADDON: CPT

## 2022-12-12 PROCEDURE — 2580000003 HC RX 258: Performed by: STUDENT IN AN ORGANIZED HEALTH CARE EDUCATION/TRAINING PROGRAM

## 2022-12-12 PROCEDURE — 81001 URINALYSIS AUTO W/SCOPE: CPT

## 2022-12-12 PROCEDURE — 96361 HYDRATE IV INFUSION ADD-ON: CPT

## 2022-12-12 PROCEDURE — 99223 1ST HOSP IP/OBS HIGH 75: CPT | Performed by: SURGERY

## 2022-12-12 PROCEDURE — 99285 EMERGENCY DEPT VISIT HI MDM: CPT

## 2022-12-12 PROCEDURE — 80053 COMPREHEN METABOLIC PANEL: CPT

## 2022-12-12 PROCEDURE — 74177 CT ABD & PELVIS W/CONTRAST: CPT

## 2022-12-12 PROCEDURE — 84703 CHORIONIC GONADOTROPIN ASSAY: CPT

## 2022-12-12 PROCEDURE — 6360000004 HC RX CONTRAST MEDICATION: Performed by: STUDENT IN AN ORGANIZED HEALTH CARE EDUCATION/TRAINING PROGRAM

## 2022-12-12 PROCEDURE — 87449 NOS EACH ORGANISM AG IA: CPT

## 2022-12-12 PROCEDURE — 85025 COMPLETE CBC W/AUTO DIFF WBC: CPT

## 2022-12-12 PROCEDURE — G0378 HOSPITAL OBSERVATION PER HR: HCPCS

## 2022-12-12 PROCEDURE — 6360000002 HC RX W HCPCS

## 2022-12-12 PROCEDURE — 83690 ASSAY OF LIPASE: CPT

## 2022-12-12 RX ORDER — METRONIDAZOLE 500 MG/100ML
500 INJECTION, SOLUTION INTRAVENOUS EVERY 8 HOURS
Status: DISCONTINUED | OUTPATIENT
Start: 2022-12-12 | End: 2022-12-13

## 2022-12-12 RX ORDER — 0.9 % SODIUM CHLORIDE 0.9 %
500 INTRAVENOUS SOLUTION INTRAVENOUS ONCE
Status: COMPLETED | OUTPATIENT
Start: 2022-12-12 | End: 2022-12-12

## 2022-12-12 RX ORDER — DROPERIDOL 2.5 MG/ML
1.25 INJECTION, SOLUTION INTRAMUSCULAR; INTRAVENOUS ONCE
Status: COMPLETED | OUTPATIENT
Start: 2022-12-12 | End: 2022-12-12

## 2022-12-12 RX ORDER — ONDANSETRON 2 MG/ML
4 INJECTION INTRAMUSCULAR; INTRAVENOUS ONCE
Status: COMPLETED | OUTPATIENT
Start: 2022-12-12 | End: 2022-12-12

## 2022-12-12 RX ORDER — SODIUM CHLORIDE 0.9 % (FLUSH) 0.9 %
5-40 SYRINGE (ML) INJECTION PRN
Status: DISCONTINUED | OUTPATIENT
Start: 2022-12-12 | End: 2022-12-14 | Stop reason: HOSPADM

## 2022-12-12 RX ORDER — ACETAMINOPHEN 325 MG/1
650 TABLET ORAL EVERY 4 HOURS PRN
Status: DISCONTINUED | OUTPATIENT
Start: 2022-12-12 | End: 2022-12-14 | Stop reason: HOSPADM

## 2022-12-12 RX ORDER — SODIUM CHLORIDE 0.9 % (FLUSH) 0.9 %
5-40 SYRINGE (ML) INJECTION EVERY 12 HOURS SCHEDULED
Status: DISCONTINUED | OUTPATIENT
Start: 2022-12-12 | End: 2022-12-14 | Stop reason: HOSPADM

## 2022-12-12 RX ORDER — OXYCODONE HYDROCHLORIDE 5 MG/1
10 TABLET ORAL EVERY 4 HOURS PRN
Status: DISCONTINUED | OUTPATIENT
Start: 2022-12-12 | End: 2022-12-14 | Stop reason: HOSPADM

## 2022-12-12 RX ORDER — ENOXAPARIN SODIUM 100 MG/ML
40 INJECTION SUBCUTANEOUS DAILY
Status: DISCONTINUED | OUTPATIENT
Start: 2022-12-13 | End: 2022-12-14 | Stop reason: HOSPADM

## 2022-12-12 RX ORDER — ONDANSETRON 4 MG/1
4 TABLET, ORALLY DISINTEGRATING ORAL EVERY 8 HOURS PRN
Status: DISCONTINUED | OUTPATIENT
Start: 2022-12-12 | End: 2022-12-14 | Stop reason: HOSPADM

## 2022-12-12 RX ORDER — DROPERIDOL 2.5 MG/ML
1.25 INJECTION, SOLUTION INTRAMUSCULAR; INTRAVENOUS EVERY 6 HOURS PRN
Status: DISCONTINUED | OUTPATIENT
Start: 2022-12-12 | End: 2022-12-12

## 2022-12-12 RX ORDER — ONDANSETRON 2 MG/ML
4 INJECTION INTRAMUSCULAR; INTRAVENOUS EVERY 6 HOURS PRN
Status: DISCONTINUED | OUTPATIENT
Start: 2022-12-12 | End: 2022-12-14 | Stop reason: HOSPADM

## 2022-12-12 RX ORDER — OXYCODONE HYDROCHLORIDE 5 MG/1
5 TABLET ORAL EVERY 4 HOURS PRN
Status: DISCONTINUED | OUTPATIENT
Start: 2022-12-12 | End: 2022-12-14 | Stop reason: HOSPADM

## 2022-12-12 RX ORDER — SODIUM CHLORIDE 9 MG/ML
INJECTION, SOLUTION INTRAVENOUS PRN
Status: DISCONTINUED | OUTPATIENT
Start: 2022-12-12 | End: 2022-12-14 | Stop reason: HOSPADM

## 2022-12-12 RX ORDER — KETOROLAC TROMETHAMINE 30 MG/ML
30 INJECTION, SOLUTION INTRAMUSCULAR; INTRAVENOUS ONCE
Status: DISCONTINUED | OUTPATIENT
Start: 2022-12-12 | End: 2022-12-12

## 2022-12-12 RX ORDER — SODIUM CHLORIDE, SODIUM LACTATE, POTASSIUM CHLORIDE, CALCIUM CHLORIDE 600; 310; 30; 20 MG/100ML; MG/100ML; MG/100ML; MG/100ML
INJECTION, SOLUTION INTRAVENOUS CONTINUOUS
Status: DISCONTINUED | OUTPATIENT
Start: 2022-12-12 | End: 2022-12-14

## 2022-12-12 RX ADMIN — SODIUM CHLORIDE, PRESERVATIVE FREE 10 ML: 5 INJECTION INTRAVENOUS at 22:49

## 2022-12-12 RX ADMIN — DROPERIDOL 1.25 MG: 2.5 INJECTION, SOLUTION INTRAMUSCULAR; INTRAVENOUS at 18:57

## 2022-12-12 RX ADMIN — HYDROMORPHONE HYDROCHLORIDE 1 MG: 1 INJECTION, SOLUTION INTRAMUSCULAR; INTRAVENOUS; SUBCUTANEOUS at 20:09

## 2022-12-12 RX ADMIN — SODIUM CHLORIDE 500 ML: 9 INJECTION, SOLUTION INTRAVENOUS at 15:36

## 2022-12-12 RX ADMIN — SODIUM CHLORIDE, POTASSIUM CHLORIDE, SODIUM LACTATE AND CALCIUM CHLORIDE: 600; 310; 30; 20 INJECTION, SOLUTION INTRAVENOUS at 22:58

## 2022-12-12 RX ADMIN — IOPAMIDOL 75 ML: 755 INJECTION, SOLUTION INTRAVENOUS at 17:17

## 2022-12-12 RX ADMIN — ONDANSETRON 4 MG: 2 INJECTION INTRAMUSCULAR; INTRAVENOUS at 15:30

## 2022-12-12 RX ADMIN — CEFEPIME 2000 MG: 2 INJECTION, POWDER, FOR SOLUTION INTRAVENOUS at 22:59

## 2022-12-12 RX ADMIN — HYDROMORPHONE HYDROCHLORIDE 0.25 MG: 1 INJECTION, SOLUTION INTRAMUSCULAR; INTRAVENOUS; SUBCUTANEOUS at 15:29

## 2022-12-12 RX ADMIN — ONDANSETRON 4 MG: 2 INJECTION INTRAMUSCULAR; INTRAVENOUS at 20:10

## 2022-12-12 ASSESSMENT — ENCOUNTER SYMPTOMS
DIARRHEA: 1
SHORTNESS OF BREATH: 0
COUGH: 0
CONSTIPATION: 0
VOMITING: 0
ABDOMINAL PAIN: 1
NAUSEA: 1
BLOOD IN STOOL: 0

## 2022-12-12 ASSESSMENT — PAIN SCALES - GENERAL
PAINLEVEL_OUTOF10: 9
PAINLEVEL_OUTOF10: 4
PAINLEVEL_OUTOF10: 7
PAINLEVEL_OUTOF10: 4
PAINLEVEL_OUTOF10: 5
PAINLEVEL_OUTOF10: 4
PAINLEVEL_OUTOF10: 5
PAINLEVEL_OUTOF10: 8

## 2022-12-12 ASSESSMENT — PAIN DESCRIPTION - ORIENTATION
ORIENTATION: RIGHT
ORIENTATION: RIGHT

## 2022-12-12 ASSESSMENT — PAIN DESCRIPTION - LOCATION
LOCATION: ABDOMEN

## 2022-12-12 ASSESSMENT — PAIN - FUNCTIONAL ASSESSMENT
PAIN_FUNCTIONAL_ASSESSMENT: 0-10
PAIN_FUNCTIONAL_ASSESSMENT: ACTIVITIES ARE NOT PREVENTED

## 2022-12-12 ASSESSMENT — PAIN DESCRIPTION - FREQUENCY: FREQUENCY: INTERMITTENT

## 2022-12-12 ASSESSMENT — PAIN DESCRIPTION - PAIN TYPE
TYPE: ACUTE PAIN
TYPE: ACUTE PAIN

## 2022-12-12 ASSESSMENT — PAIN DESCRIPTION - ONSET: ONSET: ON-GOING

## 2022-12-12 NOTE — ED PROVIDER NOTES
ED Attending Attestation Note     Date of evaluation: 12/12/2022    This patient was seen by the resident. I have seen and examined the patient, agree with the workup, evaluation, management and diagnosis. The care plan has been discussed. My assessment reveals patient is awake and alert, nontoxic in appearance. She has had an outpatient ultrasound demonstrating cholelithiasis earlier this year. Symptoms are similar to episodes that she is attributed to flares previously; however, today is more severe. Vomiting has been nonbloody nonbilious. She does have mild discomfort that she describes as epigastric bloating.      Neftali Devine MD  12/12/22 1118

## 2022-12-12 NOTE — ED PROVIDER NOTES
[address] [facility] RESIDENT NOTE       Date of evaluation: 12/12/2022    Chief Complaint     Abdominal Pain (Upper rt abd pain that \"shoots out\" started 0800 this morning, n/v/d)    History of Present Illness     Briseyda Smalls is a 30 y.o. female who presents with abdominal pain of one day duration. She reports the pain is RUQ and radiating to epigastic area. It is 7/10, constant, crampy and intermittently sharp. Pain associated with nausea, vomiting, loose stools and a low grade temp of 100F. She has taken ibuprofen without improvement in symptoms. She denies any chills, CP, SOB, palpitations or urinary symptoms. She has had RUQ pain before which has been colicky and self remitting, but never this severe. She had an US done in March which showed gallstones. Review of Systems     Review of Systems   Constitutional:  Positive for appetite change and fever (low grade). Negative for chills. Respiratory:  Negative for cough and shortness of breath. Cardiovascular:  Negative for chest pain and palpitations. Gastrointestinal:  Positive for abdominal pain, diarrhea and nausea. Negative for blood in stool, constipation and vomiting. Genitourinary:  Negative for dysuria and urgency. Neurological:  Negative for dizziness, weakness and headaches. Past Medical, Surgical, Family, and Social History     She has a past medical history of Asthma, Chicken pox, Degenerative disc disease, lumbar, Guillain Barré syndrome ([address] Hyperlipidemia, Kidney disease, Meniere's disease, Seizure ([address] Trimalleolar fracture of ankle, closed, right, initial encounter, and Viral hepatitis. She has a past surgical history that includes Tonsillectomy and adenoidectomy (1994); [place] tooth extraction; and Ankle fracture surgery (Right, 03/02/2018).   Her family history includes Arthritis in her mother; Cancer in her paternal grandfather; Depression in her mother and sister; Diabetes in her paternal grandfather and paternal grandmother; Heart Defect in her brother; High Cholesterol in her father, paternal grandfather, and paternal grandmother; Mult Sclerosis in her paternal aunt; Schizophrenia in her sister. She reports that she has never smoked. She has never used smokeless tobacco. She reports current alcohol use. She reports current drug use. Medications     Previous Medications    ALBUTEROL SULFATE  (90 BASE) MCG/ACT INHALER    INHALE 2 PUFFS BY MOUTH EVERY SIX HOURS AS NEEDED FOR WHEEZING    AMLODIPINE (NORVASC) 5 MG TABLET    TAKE 1 TABLET BY MOUTH EVERY DAY    CLOBETASOL (TEMOVATE) 0.05 % CREAM    Apply topically 2 times daily. MELOXICAM (MOBIC) 15 MG TABLET    TAKE 1 TABLET BY MOUTH EVERY DAY    NORGESTIMATE-ETHINYL ESTRADIOL (ORTHO-CYCLEN) 0.25-35 MG-MCG PER TABLET    TAKE 1 TABLET BY MOUTH EVERY DAY    SERTRALINE (ZOLOFT) 100 MG TABLET    Take 1 tablet by mouth daily    SUMATRIPTAN (IMITREX) 50 MG TABLET    Take 1 tablet by mouth once as needed for Migraine    TRAZODONE (DESYREL) 50 MG TABLET    Take 1-2 tablets 20-30 minutes before bed nightly as needed    TRIAMTERENE-HYDROCHLOROTHIAZIDE (DYAZIDE) 37.5-25 MG PER CAPSULE    TAKE 1 CAPSULE BY MOUTH EVERY DAY    VITAMIN D3 (CHOLECALCIFEROL) 25 MCG (1000 UT) TABS TABLET    TAKE 1 TABLET BY MOUTH EVERY DAY       Allergies     She is allergic to cortisone, lamictal [lamotrigine], onion, oxcarbazepine, penicillins, keppra [levetiracetam], promethazine, sulfa antibiotics, adhesive tape, azithromycin, and gabapentin. Physical Exam     INITIAL VITALS: BP: (!) 147/82, Temp: 98.1 °F (36.7 °C), Heart Rate: (!) 116, Resp: 16, SpO2: 97 %   Physical Exam  Constitutional:       Comments: In discomfort 2/2 pain   Cardiovascular:      Rate and Rhythm: Regular rhythm. Tachycardia present. Heart sounds: No murmur heard. No gallop. Pulmonary:      Effort: No respiratory distress. Breath sounds: No wheezing or rales. Abdominal:      General: Bowel sounds are normal. There is no distension. Palpations: Abdomen is soft. Tenderness: There is abdominal tenderness in the right upper quadrant and epigastric area. There is no right CVA tenderness or left CVA tenderness. Neurological:      General: No focal deficit present. Mental Status: She is alert and oriented to person, place, and time.        Diagnostic Results       RADIOLOGY:  CT ABDOMEN PELVIS W IV CONTRAST Additional Contrast? None    (Results Pending)       LABS:   Results for orders placed or performed during the hospital encounter of 12/12/22   CBC with Diff   Result Value Ref Range    WBC 16.3 (H) 4.0 - 11.0 K/uL    RBC 5.32 (H) 4.00 - 5.20 M/uL    Hemoglobin 15.5 12.0 - 16.0 g/dL    Hematocrit 44.8 36.0 - 48.0 %    MCV 84.1 80.0 - 100.0 fL    MCH 29.1 26.0 - 34.0 pg    MCHC 34.6 31.0 - 36.0 g/dL    RDW 13.4 12.4 - 15.4 %    Platelets 581 773 - 270 K/uL    MPV 7.6 5.0 - 10.5 fL    Neutrophils % 93.7 %    Lymphocytes % 3.6 %    Monocytes % 1.6 %    Eosinophils % 0.8 %    Basophils % 0.3 %    Neutrophils Absolute 15.3 (H) 1.7 - 7.7 K/uL    Lymphocytes Absolute 0.6 (L) 1.0 - 5.1 K/uL    Monocytes Absolute 0.3 0.0 - 1.3 K/uL    Eosinophils Absolute 0.1 0.0 - 0.6 K/uL    Basophils Absolute 0.1 0.0 - 0.2 K/uL   CMP w/ Reflex to MG   Result Value Ref Range    Sodium 134 (L) 136 - 145 mmol/L    Potassium reflex Magnesium 3.6 3.5 - 5.1 mmol/L    Chloride 100 99 - 110 mmol/L    CO2 18 (L) 21 - 32 mmol/L    Anion Gap 16 3 - 16    Glucose 105 (H) 70 - 99 mg/dL    BUN 13 7 - 20 mg/dL    Creatinine <0.5 (L) 0.6 - 1.1 mg/dL    Est, Glom Filt Rate >60 >60    Calcium 8.6 8.3 - 10.6 mg/dL    Total Protein 7.4 6.4 - 8.2 g/dL    Albumin 4.4 3.4 - 5.0 g/dL    Albumin/Globulin Ratio 1.5 1.1 - 2.2    Total Bilirubin <0.2 0.0 - 1.0 mg/dL    Alkaline Phosphatase 77 40 - 129 U/L    ALT 11 10 - 40 U/L    AST 14 (L) 15 - 37 U/L   Lipase   Result Value Ref Range    Lipase 33.0 13.0 - 60.0 U/L   Urinalysis with Reflex to Culture    Specimen: Urine   Result Value Ref Range    Color, UA Yellow Straw/Yellow    Clarity, UA CLOUDY (A) Clear    Glucose, Ur Negative Negative mg/dL    Bilirubin Urine Negative Negative    Ketones, Urine 15 (A) Negative mg/dL    Specific Gravity, UA 1.025 1.005 - 1.030    Blood, Urine TRACE-INTACT (A) Negative    pH, UA 6.5 5.0 - 8.0    Protein,  (A) Negative mg/dL    Urobilinogen, Urine 0.2 <2.0 E.U./dL    Nitrite, Urine Negative Negative    Leukocyte Esterase, Urine Negative Negative    Microscopic Examination YES     Urine Type Voided     Urine Reflex to Culture Not Indicated    Microscopic Urinalysis   Result Value Ref Range    WBC, UA 0-2 0 - 5 /HPF    RBC, UA None seen 0 - 4 /HPF    Renal Epithelial, UA 21-50 (A) 0 - 1 /HPF    Bacteria, UA 3+ (A) None Seen /HPF    Amorphous, UA 1+ /HPF   HCG Qualitative, Serum   Result Value Ref Range    hCG Qual Negative Detects HCG level >10 MIU/mL       ED BEDSIDE ULTRASOUND:  No results found. RECENT VITALS:  BP: (!) 147/82, Temp: 98.1 °F (36.7 °C),Heart Rate: (!) 106, Resp: 16, SpO2: 97 %       ED Course     Nursing Notes, Past Medical Hx, Past Surgical Hx, Social Hx, Allergies, and FamilyHx were reviewed. ED Course as of 12/12/22 1652   Mon Dec 12, 2022   1602 HCG Qualitative, Serum [SR]   1631 HCG Qualitative, Serum [SR]      ED Course User Index  [SR] Sharma Gilford, MD       The patient was giventhe following medications:  Orders Placed This Encounter   Medications    ondansetron (ZOFRAN) injection 4 mg    DISCONTD: ketorolac (TORADOL) injection 30 mg    DISCONTD: HYDROmorphone (DILAUDID) injection 0.25 mg    HYDROmorphone (DILAUDID) injection 0.25 mg    0.9 % sodium chloride bolus       CONSULTS:  None    MEDICAL DECISION MAKING / ASSESSMENT / Rigoebrto Daily is a 30 y.o. female who presents with RUQ and epigastric pain. CBC, CMP, lipase, UA and beta HCG were ordered. CBC with leukocytosis.  CMP with hyponatremia, but without electrolyte abnormalities or BEHZAD. UA negative for UTI. Lipase wnl. HCG is negative. Patient was given a 0.5L bolus. Zofran and dilaudid were given for symptom management. At this point, I am going off service and have signed out to Dr. Jemima Pritchett who will be assuming care. Workup pending imaging. Disposition depends on imaging results. This patient was also evaluated by the attending physician. All care plans were discussed and agreed upon. Clinical Impression     No diagnosis found. Disposition     PATIENT REFERRED TO:  No follow-up provider specified.     DISCHARGE MEDICATIONS:  New Prescriptions    No medications on file       [facility] Sandra Abdi MD  Resident  12/12/22 3271

## 2022-12-12 NOTE — DISCHARGE INSTRUCTIONS
Discharge Instructions:    Diet:   You may resume a low fat diet. Wound Care:   Skin glue was used to cover your incision(s). Please note that this glue is tinted purple; therefore, a slight purple hue around your incisions is normal. The skin glue will fall off on its own in about 10 days. You may shower, but do not scrub the incision sites directly or soak (tub, pool, etc.). Activity:   No heavy lifting greater than a milk jug (~8lbs) until follow up, at which time you will receive further instruction. Pain management:   Unless informed of any restrictions by your primary care physician, please use your preferred over-the-counter pain reliever as your primary pain medication. If you have pain that persists despite over-the-counter pain medications, you have been provided with a prescription for an opioid/narcotic pain reliever (Oxycodone). No driving or operating machinery while taking opioid/narcotic medications. If you are not taking the opioid pain medication, then you can drive when you feel as though you can sit comfortably behind the steering wheel and can slam on the brake or turn the wheel sharply without it hurting. We recommend that you practice this while sitting the car with it parked in your driveway before trying to drive on the road. Reasons to Return:   Some soreness and redness is common after surgery, especially for the first 24-48 hours. If, however, you experience for increasing redness, worsening pain, new and/or increasing drainage from wound, fever above 101.5 degrees Farenheit, bleeding that does not stop soon after discovery, or any other concerns about your incision or post op course, please either call the office or call/return to the Emergency Department for further evaluation. Follow up with Dr J Luis Randall (surgery) in 1-2 weeks as previously scheduled. Please call the office to confirm your appointment.  [phone]    We recommend that you call your primary care physician within the first 3-5 days following discharge to inform them that you were recently hospitalized and potentially schedule a visit at their discretion.

## 2022-12-12 NOTE — ED NOTES
Patient complains of RQ abdominal pain after eating breakfast this morning.  +nausea and vomiting. PIV placed using aseptic technique per hospital policy. Blood collected and sent to lab.       Charles Sy RN  12/12/22 3107

## 2022-12-13 ENCOUNTER — ANESTHESIA (OUTPATIENT)
Dept: OPERATING ROOM | Age: 37
End: 2022-12-13
Payer: COMMERCIAL

## 2022-12-13 ENCOUNTER — APPOINTMENT (OUTPATIENT)
Dept: ULTRASOUND IMAGING | Age: 37
DRG: 263 | End: 2022-12-13
Payer: COMMERCIAL

## 2022-12-13 ENCOUNTER — ANESTHESIA EVENT (OUTPATIENT)
Dept: OPERATING ROOM | Age: 37
End: 2022-12-13
Payer: COMMERCIAL

## 2022-12-13 LAB
ABO/RH: NORMAL
ALBUMIN SERPL-MCNC: 3.8 G/DL (ref 3.4–5)
ALP BLD-CCNC: 61 U/L (ref 40–129)
ALT SERPL-CCNC: 9 U/L (ref 10–40)
ANION GAP SERPL CALCULATED.3IONS-SCNC: 8 MMOL/L (ref 3–16)
ANTIBODY SCREEN: NORMAL
AST SERPL-CCNC: 10 U/L (ref 15–37)
BASOPHILS ABSOLUTE: 0 K/UL (ref 0–0.2)
BASOPHILS RELATIVE PERCENT: 0.1 %
BILIRUB SERPL-MCNC: <0.2 MG/DL (ref 0–1)
BILIRUBIN DIRECT: <0.2 MG/DL (ref 0–0.3)
BILIRUBIN, INDIRECT: ABNORMAL MG/DL (ref 0–1)
BUN BLDV-MCNC: 7 MG/DL (ref 7–20)
C DIFF TOXIN/ANTIGEN: NORMAL
CALCIUM SERPL-MCNC: 7.9 MG/DL (ref 8.3–10.6)
CHLORIDE BLD-SCNC: 101 MMOL/L (ref 99–110)
CO2: 25 MMOL/L (ref 21–32)
CREAT SERPL-MCNC: <0.5 MG/DL (ref 0.6–1.1)
EOSINOPHILS ABSOLUTE: 0 K/UL (ref 0–0.6)
EOSINOPHILS RELATIVE PERCENT: 0.4 %
GFR SERPL CREATININE-BSD FRML MDRD: >60 ML/MIN/{1.73_M2}
GLUCOSE BLD-MCNC: 103 MG/DL (ref 70–99)
HCT VFR BLD CALC: 40.5 % (ref 36–48)
HEMOGLOBIN: 13.8 G/DL (ref 12–16)
LYMPHOCYTES ABSOLUTE: 1.1 K/UL (ref 1–5.1)
LYMPHOCYTES RELATIVE PERCENT: 16.1 %
MAGNESIUM: 1.9 MG/DL (ref 1.8–2.4)
MCH RBC QN AUTO: 29.3 PG (ref 26–34)
MCHC RBC AUTO-ENTMCNC: 34 G/DL (ref 31–36)
MCV RBC AUTO: 86.1 FL (ref 80–100)
MONOCYTES ABSOLUTE: 0.3 K/UL (ref 0–1.3)
MONOCYTES RELATIVE PERCENT: 4.9 %
NEUTROPHILS ABSOLUTE: 5.5 K/UL (ref 1.7–7.7)
NEUTROPHILS RELATIVE PERCENT: 78.5 %
PDW BLD-RTO: 13.2 % (ref 12.4–15.4)
PHOSPHORUS: 1.9 MG/DL (ref 2.5–4.9)
PLATELET # BLD: 354 K/UL (ref 135–450)
PMV BLD AUTO: 7.4 FL (ref 5–10.5)
POTASSIUM SERPL-SCNC: 3.3 MMOL/L (ref 3.5–5.1)
RBC # BLD: 4.71 M/UL (ref 4–5.2)
SODIUM BLD-SCNC: 134 MMOL/L (ref 136–145)
TOTAL PROTEIN: 6.2 G/DL (ref 6.4–8.2)
WBC # BLD: 7 K/UL (ref 4–11)

## 2022-12-13 PROCEDURE — 36415 COLL VENOUS BLD VENIPUNCTURE: CPT

## 2022-12-13 PROCEDURE — S2900 ROBOTIC SURGICAL SYSTEM: HCPCS | Performed by: SURGERY

## 2022-12-13 PROCEDURE — 80076 HEPATIC FUNCTION PANEL: CPT

## 2022-12-13 PROCEDURE — 86900 BLOOD TYPING SEROLOGIC ABO: CPT

## 2022-12-13 PROCEDURE — 93005 ELECTROCARDIOGRAM TRACING: CPT | Performed by: STUDENT IN AN ORGANIZED HEALTH CARE EDUCATION/TRAINING PROGRAM

## 2022-12-13 PROCEDURE — 6370000000 HC RX 637 (ALT 250 FOR IP): Performed by: STUDENT IN AN ORGANIZED HEALTH CARE EDUCATION/TRAINING PROGRAM

## 2022-12-13 PROCEDURE — 2580000003 HC RX 258: Performed by: STUDENT IN AN ORGANIZED HEALTH CARE EDUCATION/TRAINING PROGRAM

## 2022-12-13 PROCEDURE — 3600000009 HC SURGERY ROBOT BASE: Performed by: SURGERY

## 2022-12-13 PROCEDURE — 2500000003 HC RX 250 WO HCPCS: Performed by: NURSE PRACTITIONER

## 2022-12-13 PROCEDURE — 2580000003 HC RX 258: Performed by: SURGERY

## 2022-12-13 PROCEDURE — 6360000002 HC RX W HCPCS: Performed by: FAMILY MEDICINE

## 2022-12-13 PROCEDURE — 3600000019 HC SURGERY ROBOT ADDTL 15MIN: Performed by: SURGERY

## 2022-12-13 PROCEDURE — 1200000000 HC SEMI PRIVATE

## 2022-12-13 PROCEDURE — 80069 RENAL FUNCTION PANEL: CPT

## 2022-12-13 PROCEDURE — 86901 BLOOD TYPING SEROLOGIC RH(D): CPT

## 2022-12-13 PROCEDURE — 96365 THER/PROPH/DIAG IV INF INIT: CPT

## 2022-12-13 PROCEDURE — 8E0W4CZ ROBOTIC ASSISTED PROCEDURE OF TRUNK REGION, PERCUTANEOUS ENDOSCOPIC APPROACH: ICD-10-PCS | Performed by: SURGERY

## 2022-12-13 PROCEDURE — 96367 TX/PROPH/DG ADDL SEQ IV INF: CPT

## 2022-12-13 PROCEDURE — 2500000003 HC RX 250 WO HCPCS: Performed by: SURGERY

## 2022-12-13 PROCEDURE — 2709999900 HC NON-CHARGEABLE SUPPLY: Performed by: SURGERY

## 2022-12-13 PROCEDURE — 2580000003 HC RX 258: Performed by: NURSE PRACTITIONER

## 2022-12-13 PROCEDURE — 47562 LAPAROSCOPIC CHOLECYSTECTOMY: CPT | Performed by: SURGERY

## 2022-12-13 PROCEDURE — G0378 HOSPITAL OBSERVATION PER HR: HCPCS

## 2022-12-13 PROCEDURE — 6360000002 HC RX W HCPCS: Performed by: NURSE PRACTITIONER

## 2022-12-13 PROCEDURE — 7100000000 HC PACU RECOVERY - FIRST 15 MIN: Performed by: SURGERY

## 2022-12-13 PROCEDURE — 2500000003 HC RX 250 WO HCPCS: Performed by: NURSE ANESTHETIST, CERTIFIED REGISTERED

## 2022-12-13 PROCEDURE — 96366 THER/PROPH/DIAG IV INF ADDON: CPT

## 2022-12-13 PROCEDURE — 88304 TISSUE EXAM BY PATHOLOGIST: CPT

## 2022-12-13 PROCEDURE — 3700000000 HC ANESTHESIA ATTENDED CARE: Performed by: SURGERY

## 2022-12-13 PROCEDURE — 3700000001 HC ADD 15 MINUTES (ANESTHESIA): Performed by: SURGERY

## 2022-12-13 PROCEDURE — 0FT44ZZ RESECTION OF GALLBLADDER, PERCUTANEOUS ENDOSCOPIC APPROACH: ICD-10-PCS | Performed by: SURGERY

## 2022-12-13 PROCEDURE — 7100000001 HC PACU RECOVERY - ADDTL 15 MIN: Performed by: SURGERY

## 2022-12-13 PROCEDURE — A4217 STERILE WATER/SALINE, 500 ML: HCPCS | Performed by: SURGERY

## 2022-12-13 PROCEDURE — 85025 COMPLETE CBC W/AUTO DIFF WBC: CPT

## 2022-12-13 PROCEDURE — 99233 SBSQ HOSP IP/OBS HIGH 50: CPT | Performed by: SURGERY

## 2022-12-13 PROCEDURE — 83735 ASSAY OF MAGNESIUM: CPT

## 2022-12-13 PROCEDURE — 86850 RBC ANTIBODY SCREEN: CPT

## 2022-12-13 PROCEDURE — 6360000002 HC RX W HCPCS: Performed by: NURSE ANESTHETIST, CERTIFIED REGISTERED

## 2022-12-13 PROCEDURE — 96372 THER/PROPH/DIAG INJ SC/IM: CPT

## 2022-12-13 PROCEDURE — 76705 ECHO EXAM OF ABDOMEN: CPT

## 2022-12-13 PROCEDURE — 94799 UNLISTED PULMONARY SVC/PX: CPT

## 2022-12-13 PROCEDURE — 2580000003 HC RX 258: Performed by: NURSE ANESTHETIST, CERTIFIED REGISTERED

## 2022-12-13 PROCEDURE — 94150 VITAL CAPACITY TEST: CPT

## 2022-12-13 PROCEDURE — 2500000003 HC RX 250 WO HCPCS: Performed by: STUDENT IN AN ORGANIZED HEALTH CARE EDUCATION/TRAINING PROGRAM

## 2022-12-13 PROCEDURE — 6360000002 HC RX W HCPCS: Performed by: STUDENT IN AN ORGANIZED HEALTH CARE EDUCATION/TRAINING PROGRAM

## 2022-12-13 RX ORDER — GLYCOPYRROLATE 0.2 MG/ML
INJECTION INTRAMUSCULAR; INTRAVENOUS PRN
Status: DISCONTINUED | OUTPATIENT
Start: 2022-12-13 | End: 2022-12-13 | Stop reason: SDUPTHER

## 2022-12-13 RX ORDER — PROPOFOL 10 MG/ML
INJECTION, EMULSION INTRAVENOUS PRN
Status: DISCONTINUED | OUTPATIENT
Start: 2022-12-13 | End: 2022-12-13 | Stop reason: SDUPTHER

## 2022-12-13 RX ORDER — BUPIVACAINE HYDROCHLORIDE 2.5 MG/ML
INJECTION, SOLUTION EPIDURAL; INFILTRATION; INTRACAUDAL PRN
Status: DISCONTINUED | OUTPATIENT
Start: 2022-12-13 | End: 2022-12-13 | Stop reason: HOSPADM

## 2022-12-13 RX ORDER — OXYCODONE HYDROCHLORIDE 5 MG/1
10 TABLET ORAL PRN
Status: DISCONTINUED | OUTPATIENT
Start: 2022-12-13 | End: 2022-12-13 | Stop reason: HOSPADM

## 2022-12-13 RX ORDER — FAMOTIDINE 10 MG/ML
INJECTION, SOLUTION INTRAVENOUS PRN
Status: DISCONTINUED | OUTPATIENT
Start: 2022-12-13 | End: 2022-12-13 | Stop reason: SDUPTHER

## 2022-12-13 RX ORDER — MAGNESIUM HYDROXIDE 1200 MG/15ML
LIQUID ORAL CONTINUOUS PRN
Status: DISCONTINUED | OUTPATIENT
Start: 2022-12-13 | End: 2022-12-13 | Stop reason: HOSPADM

## 2022-12-13 RX ORDER — SODIUM CHLORIDE 9 MG/ML
INJECTION, SOLUTION INTRAVENOUS CONTINUOUS
Status: DISCONTINUED | OUTPATIENT
Start: 2022-12-13 | End: 2022-12-13

## 2022-12-13 RX ORDER — SODIUM CHLORIDE 0.9 % (FLUSH) 0.9 %
5-40 SYRINGE (ML) INJECTION PRN
Status: DISCONTINUED | OUTPATIENT
Start: 2022-12-13 | End: 2022-12-13 | Stop reason: HOSPADM

## 2022-12-13 RX ORDER — MIDAZOLAM HYDROCHLORIDE 1 MG/ML
INJECTION INTRAMUSCULAR; INTRAVENOUS PRN
Status: DISCONTINUED | OUTPATIENT
Start: 2022-12-13 | End: 2022-12-13 | Stop reason: SDUPTHER

## 2022-12-13 RX ORDER — LIDOCAINE HYDROCHLORIDE 20 MG/ML
INJECTION, SOLUTION INTRAVENOUS PRN
Status: DISCONTINUED | OUTPATIENT
Start: 2022-12-13 | End: 2022-12-13 | Stop reason: SDUPTHER

## 2022-12-13 RX ORDER — DOCUSATE SODIUM 100 MG/1
100 CAPSULE, LIQUID FILLED ORAL 2 TIMES DAILY
Qty: 28 CAPSULE | Refills: 0 | Status: SHIPPED | OUTPATIENT
Start: 2022-12-13 | End: 2022-12-27

## 2022-12-13 RX ORDER — LABETALOL HYDROCHLORIDE 5 MG/ML
10 INJECTION, SOLUTION INTRAVENOUS
Status: DISCONTINUED | OUTPATIENT
Start: 2022-12-13 | End: 2022-12-13 | Stop reason: HOSPADM

## 2022-12-13 RX ORDER — SODIUM CHLORIDE 9 MG/ML
25 INJECTION, SOLUTION INTRAVENOUS PRN
Status: DISCONTINUED | OUTPATIENT
Start: 2022-12-13 | End: 2022-12-13 | Stop reason: HOSPADM

## 2022-12-13 RX ORDER — DEXAMETHASONE SODIUM PHOSPHATE 4 MG/ML
INJECTION, SOLUTION INTRA-ARTICULAR; INTRALESIONAL; INTRAMUSCULAR; INTRAVENOUS; SOFT TISSUE PRN
Status: DISCONTINUED | OUTPATIENT
Start: 2022-12-13 | End: 2022-12-13 | Stop reason: SDUPTHER

## 2022-12-13 RX ORDER — ROCURONIUM BROMIDE 10 MG/ML
INJECTION, SOLUTION INTRAVENOUS PRN
Status: DISCONTINUED | OUTPATIENT
Start: 2022-12-13 | End: 2022-12-13 | Stop reason: SDUPTHER

## 2022-12-13 RX ORDER — SODIUM CHLORIDE, SODIUM LACTATE, POTASSIUM CHLORIDE, AND CALCIUM CHLORIDE .6; .31; .03; .02 G/100ML; G/100ML; G/100ML; G/100ML
IRRIGANT IRRIGATION PRN
Status: DISCONTINUED | OUTPATIENT
Start: 2022-12-13 | End: 2022-12-13 | Stop reason: HOSPADM

## 2022-12-13 RX ORDER — FENTANYL CITRATE 50 UG/ML
INJECTION, SOLUTION INTRAMUSCULAR; INTRAVENOUS PRN
Status: DISCONTINUED | OUTPATIENT
Start: 2022-12-13 | End: 2022-12-13 | Stop reason: SDUPTHER

## 2022-12-13 RX ORDER — DIPHENHYDRAMINE HYDROCHLORIDE 50 MG/ML
12.5 INJECTION INTRAMUSCULAR; INTRAVENOUS
Status: COMPLETED | OUTPATIENT
Start: 2022-12-13 | End: 2022-12-13

## 2022-12-13 RX ORDER — LORAZEPAM 2 MG/ML
0.5 INJECTION INTRAMUSCULAR
Status: DISCONTINUED | OUTPATIENT
Start: 2022-12-13 | End: 2022-12-13 | Stop reason: HOSPADM

## 2022-12-13 RX ORDER — ONDANSETRON 2 MG/ML
4 INJECTION INTRAMUSCULAR; INTRAVENOUS
Status: DISCONTINUED | OUTPATIENT
Start: 2022-12-13 | End: 2022-12-13 | Stop reason: HOSPADM

## 2022-12-13 RX ORDER — OXYCODONE HYDROCHLORIDE 5 MG/1
5 TABLET ORAL PRN
Status: DISCONTINUED | OUTPATIENT
Start: 2022-12-13 | End: 2022-12-13 | Stop reason: HOSPADM

## 2022-12-13 RX ORDER — FENTANYL CITRATE 50 UG/ML
25 INJECTION, SOLUTION INTRAMUSCULAR; INTRAVENOUS EVERY 5 MIN PRN
Status: DISCONTINUED | OUTPATIENT
Start: 2022-12-13 | End: 2022-12-13 | Stop reason: HOSPADM

## 2022-12-13 RX ORDER — SODIUM CHLORIDE 0.9 % (FLUSH) 0.9 %
5-40 SYRINGE (ML) INJECTION EVERY 12 HOURS SCHEDULED
Status: DISCONTINUED | OUTPATIENT
Start: 2022-12-13 | End: 2022-12-13 | Stop reason: HOSPADM

## 2022-12-13 RX ORDER — ONDANSETRON 2 MG/ML
INJECTION INTRAMUSCULAR; INTRAVENOUS PRN
Status: DISCONTINUED | OUTPATIENT
Start: 2022-12-13 | End: 2022-12-13 | Stop reason: SDUPTHER

## 2022-12-13 RX ORDER — SODIUM CHLORIDE, SODIUM LACTATE, POTASSIUM CHLORIDE, CALCIUM CHLORIDE 600; 310; 30; 20 MG/100ML; MG/100ML; MG/100ML; MG/100ML
INJECTION, SOLUTION INTRAVENOUS CONTINUOUS PRN
Status: DISCONTINUED | OUTPATIENT
Start: 2022-12-13 | End: 2022-12-13 | Stop reason: SDUPTHER

## 2022-12-13 RX ORDER — MAGNESIUM SULFATE IN WATER 40 MG/ML
2000 INJECTION, SOLUTION INTRAVENOUS ONCE
Status: COMPLETED | OUTPATIENT
Start: 2022-12-13 | End: 2022-12-13

## 2022-12-13 RX ORDER — PHENYLEPHRINE HYDROCHLORIDE 10 MG/ML
INJECTION INTRAVENOUS PRN
Status: DISCONTINUED | OUTPATIENT
Start: 2022-12-13 | End: 2022-12-13 | Stop reason: SDUPTHER

## 2022-12-13 RX ORDER — OXYCODONE HYDROCHLORIDE 5 MG/1
5 TABLET ORAL EVERY 6 HOURS PRN
Qty: 20 TABLET | Refills: 0 | Status: SHIPPED | OUTPATIENT
Start: 2022-12-13 | End: 2022-12-18

## 2022-12-13 RX ORDER — MEPERIDINE HYDROCHLORIDE 25 MG/ML
12.5 INJECTION INTRAMUSCULAR; INTRAVENOUS; SUBCUTANEOUS EVERY 5 MIN PRN
Status: DISCONTINUED | OUTPATIENT
Start: 2022-12-13 | End: 2022-12-13 | Stop reason: HOSPADM

## 2022-12-13 RX ORDER — HYDROMORPHONE HCL 110MG/55ML
PATIENT CONTROLLED ANALGESIA SYRINGE INTRAVENOUS PRN
Status: DISCONTINUED | OUTPATIENT
Start: 2022-12-13 | End: 2022-12-13 | Stop reason: SDUPTHER

## 2022-12-13 RX ADMIN — MIDAZOLAM HYDROCHLORIDE 2 MG: 2 INJECTION, SOLUTION INTRAMUSCULAR; INTRAVENOUS at 16:09

## 2022-12-13 RX ADMIN — FENTANYL CITRATE 25 MCG: 50 INJECTION, SOLUTION INTRAMUSCULAR; INTRAVENOUS at 18:46

## 2022-12-13 RX ADMIN — ROCURONIUM BROMIDE 100 MG: 10 INJECTION INTRAVENOUS at 16:15

## 2022-12-13 RX ADMIN — SUGAMMADEX 300 MG: 100 INJECTION, SOLUTION INTRAVENOUS at 17:51

## 2022-12-13 RX ADMIN — ACETAMINOPHEN 650 MG: 325 TABLET, FILM COATED ORAL at 08:45

## 2022-12-13 RX ADMIN — HYDROMORPHONE HYDROCHLORIDE 2 MG: 2 INJECTION, SOLUTION INTRAMUSCULAR; INTRAVENOUS; SUBCUTANEOUS at 16:09

## 2022-12-13 RX ADMIN — MAGNESIUM SULFATE HEPTAHYDRATE 2000 MG: 40 INJECTION, SOLUTION INTRAVENOUS at 10:22

## 2022-12-13 RX ADMIN — METRONIDAZOLE 500 MG: 500 INJECTION, SOLUTION INTRAVENOUS at 05:55

## 2022-12-13 RX ADMIN — FAMOTIDINE 20 MG: 10 INJECTION, SOLUTION INTRAVENOUS at 16:22

## 2022-12-13 RX ADMIN — GLYCOPYRROLATE 0.3 MG: 0.2 INJECTION INTRAMUSCULAR; INTRAVENOUS at 16:40

## 2022-12-13 RX ADMIN — POTASSIUM PHOSPHATE, MONOBASIC POTASSIUM PHOSPHATE, DIBASIC 30 MMOL: 224; 236 INJECTION, SOLUTION, CONCENTRATE INTRAVENOUS at 12:31

## 2022-12-13 RX ADMIN — FENTANYL CITRATE 100 MCG: 50 INJECTION, SOLUTION INTRAMUSCULAR; INTRAVENOUS at 16:09

## 2022-12-13 RX ADMIN — METRONIDAZOLE 500 MG: 500 INJECTION, SOLUTION INTRAVENOUS at 00:01

## 2022-12-13 RX ADMIN — DEXAMETHASONE SODIUM PHOSPHATE 8 MG: 4 INJECTION, SOLUTION INTRAMUSCULAR; INTRAVENOUS at 16:22

## 2022-12-13 RX ADMIN — SODIUM CHLORIDE, SODIUM LACTATE, POTASSIUM CHLORIDE, AND CALCIUM CHLORIDE: .6; .31; .03; .02 INJECTION, SOLUTION INTRAVENOUS at 17:31

## 2022-12-13 RX ADMIN — OXYCODONE 5 MG: 5 TABLET ORAL at 05:57

## 2022-12-13 RX ADMIN — DIPHENHYDRAMINE HYDROCHLORIDE 12.5 MG: 50 INJECTION, SOLUTION INTRAMUSCULAR; INTRAVENOUS at 18:49

## 2022-12-13 RX ADMIN — ENOXAPARIN SODIUM 40 MG: 100 INJECTION SUBCUTANEOUS at 08:45

## 2022-12-13 RX ADMIN — SODIUM CHLORIDE, SODIUM LACTATE, POTASSIUM CHLORIDE, AND CALCIUM CHLORIDE: .6; .31; .03; .02 INJECTION, SOLUTION INTRAVENOUS at 16:07

## 2022-12-13 RX ADMIN — ONDANSETRON 4 MG: 2 INJECTION INTRAMUSCULAR; INTRAVENOUS at 16:22

## 2022-12-13 RX ADMIN — LIDOCAINE HYDROCHLORIDE 100 MG: 20 INJECTION, SOLUTION INTRAVENOUS at 16:12

## 2022-12-13 RX ADMIN — PHENYLEPHRINE HYDROCHLORIDE 200 MCG: 10 INJECTION INTRAVENOUS at 16:40

## 2022-12-13 RX ADMIN — ALUMINUM HYDROXIDE, MAGNESIUM HYDROXIDE, AND SIMETHICONE: 200; 200; 20 SUSPENSION ORAL at 00:34

## 2022-12-13 RX ADMIN — CEFEPIME 2000 MG: 2 INJECTION, POWDER, FOR SOLUTION INTRAVENOUS at 10:11

## 2022-12-13 RX ADMIN — METRONIDAZOLE 500 MG: 500 INJECTION, SOLUTION INTRAVENOUS at 14:29

## 2022-12-13 RX ADMIN — PROPOFOL 100 MG: 10 INJECTION, EMULSION INTRAVENOUS at 16:14

## 2022-12-13 ASSESSMENT — PAIN SCALES - GENERAL
PAINLEVEL_OUTOF10: 4
PAINLEVEL_OUTOF10: 5
PAINLEVEL_OUTOF10: 0
PAINLEVEL_OUTOF10: 5
PAINLEVEL_OUTOF10: 4
PAINLEVEL_OUTOF10: 1
PAINLEVEL_OUTOF10: 4
PAINLEVEL_OUTOF10: 2
PAINLEVEL_OUTOF10: 6

## 2022-12-13 ASSESSMENT — PAIN DESCRIPTION - LOCATION
LOCATION: ABDOMEN

## 2022-12-13 ASSESSMENT — PAIN DESCRIPTION - PAIN TYPE
TYPE: ACUTE PAIN
TYPE: ACUTE PAIN

## 2022-12-13 ASSESSMENT — PAIN DESCRIPTION - ORIENTATION
ORIENTATION: RIGHT;UPPER
ORIENTATION: RIGHT
ORIENTATION: RIGHT;LOWER
ORIENTATION: RIGHT

## 2022-12-13 ASSESSMENT — PAIN DESCRIPTION - ONSET: ONSET: ON-GOING

## 2022-12-13 ASSESSMENT — PAIN DESCRIPTION - DESCRIPTORS
DESCRIPTORS: ACHING
DESCRIPTORS: ACHING;BURNING

## 2022-12-13 ASSESSMENT — PAIN - FUNCTIONAL ASSESSMENT
PAIN_FUNCTIONAL_ASSESSMENT: ACTIVITIES ARE NOT PREVENTED
PAIN_FUNCTIONAL_ASSESSMENT: PREVENTS OR INTERFERES SOME ACTIVE ACTIVITIES AND ADLS

## 2022-12-13 ASSESSMENT — PAIN DESCRIPTION - FREQUENCY
FREQUENCY: CONTINUOUS
FREQUENCY: INTERMITTENT

## 2022-12-13 NOTE — PROGRESS NOTES
Pain is well managed with oxycodone and tylenol. Pt's biggest complaint is frequent diarrhea. Surgery team does not want to administer anti-diarrheal. NPO for procedure later today.

## 2022-12-13 NOTE — PROGRESS NOTES
Surgery Daily Progress Note  Cecilia Acuna  CC:  RUQ abdominal and epigastric pain    Subjective : No overnight events, pain controlled on medications. VSS. Objective    Infusions:   sodium chloride      lactated ringers Stopped (12/12/22 2258)        I/O:No intake/output data recorded. Wt Readings from Last 1 Encounters:   12/12/22 201 lb 15.1 oz (91.6 kg)       LABS:    Recent Labs     12/12/22  1532   WBC 16.3*   HGB 15.5   HCT 44.8   MCV 84.1         Recent Labs     12/12/22  1532   *   K 3.6      CO2 18*   BUN 13   CREATININE <0.5*      Recent Labs     12/12/22  1532   AST 14*   ALT 11   BILITOT <0.2   ALKPHOS 77        Recent Labs     12/12/22  1532   LIPASE 33.0        Recent Labs     12/12/22  1532   PROT 7.4      No results for input(s): CKTOTAL, CKMB, CKMBINDEX, TROPONINI in the last 72 hours. Exam:BP (!) 146/92   Pulse 96   Temp 98.1 °F (36.7 °C) (Oral)   Resp 18   Ht 5' 5\" (1.651 m)   Wt 201 lb 15.1 oz (91.6 kg)   LMP 12/12/2022   SpO2 98%   BMI 33.60 kg/m²     General appearance: alert, appears stated age and cooperative  Neck: trachea midline  Heart: regular rate and rhythm,   Lungs: unlabored  Skin: warm and dry, no rashes  Extremities: no edema, no cyanosis  Abdomen: soft, non distended, moderately-tender RUQ abdominal pain and epigastric pain. ASSESSMENT/PLAN: This is a 30 y.o. female with s/o RUQ and epigastric abdominal pain, nausea, vomiting, and diarrhea. CT obtained in the ED showed a distended gallbladder without evidence of stones. Labs on admit notable for a WBC of 16.3. Her LFTs are within normal limits.     - Follow up RUQ US  - to OR today for Laparoscopic vs robotic cholecystectomy with Dr Diaz Life   - Diet: NPO for procedure  - Antibiotics: cefepime   - DVT prophylaxis: lovenox       KEV Chau - CNP 12/13/2022 6:44 AM   Available via [employer] [phone]

## 2022-12-13 NOTE — PROGRESS NOTES
PRE-OP NOTE  Department of Surgery      Procedure: Laparoscopic vs robotic cholecystectomy     Consent: Signed and on chart     Labs      Recent Labs     12/12/22  1532   WBC 16.3*   HGB 15.5   HCT 44.8   MCV 84.1           Recent Labs     12/12/22  1532   *   K 3.6      CO2 18*   BUN 13   CREATININE <0.5*        Recent Labs     12/12/22  1532   AST 14*   ALT 11   BILITOT <0.2   ALKPHOS 77        Recent Labs     12/12/22  1532   LIPASE 33.0        Recent Labs     12/12/22  1532   PROT 7.4      No results for input(s): CKTOTAL, CKMB, CKMBINDEX, TROPONINI in the last 72 hours. Orders:   1. Diet: NPO at MN,  IVF LR at 100cc/hr   2. Pre op Medications:  Scheduled cefepime/flagyl, schedule Lovenox   3. Labs to be drawn: Type and Screen, pregnancy test   4.  Anesthesia to see patient    Puja Nicholson MD  PGY1, General Surgery  12/12/22  10:42 PM  [facility]#[phone]

## 2022-12-13 NOTE — CARE COORDINATION
CM Note:  CM met with pt at bedside. Pt is from home with her  and two children and is independent pta. Pt will return home at discharge with her  to transport. Pt reports no HHC, DME or CM needs at discharge. Pt is not at high risk for readmission, has not recently been admitted, and there is no active consult for Case Management services.  will sign off on this pt at this time. If needs arise, please consult Case Management services.     Risk of Readmission Score 8%  Electronically signed by NYLA Puentes on 12/13/2022 at 11:58 AM  [phone]

## 2022-12-13 NOTE — PROGRESS NOTES
Pharmacy Note - Extended Infusion Beta-Lactam Adjustment    Cefepime ordered for treatment of cholecystitis. Per [facility] Extended Infusion Beta-Lactam Policy, dosing will be changed to 2000 mg (over 30 min) followed by 2000 mg (extended infusion ) every 12 hours. Estimated Creatinine Clearance: Estimated Creatinine Clearance: 176 mL/min (based on SCr of 0.5 mg/dL). BMI: Body mass index is 33.6 kg/m². Rationale for Adjustment: Agent demonstrates time-dependent effect on bacterial eradication. Extended-infusion dosing strategy aims to enhance microbiologic and clinical efficacy. Pharmacy will continue to monitor cultures and sensitivities (where available) and adjust dose as necessary. Please call with any questions    Eligio Babin  Pharm. D. BCPS  [phone]  [phone] ([address] [place] [address])

## 2022-12-13 NOTE — H&P
Bariatric Surgery   Resident History & Physical    Reason for Consult:     History of Present Illness:   Mendel Dowse is a 30 y.o. female presenting with RUQ and epigastric abdominal pain, nausea, vomiting, and diarrhea that began earlier today. She describes this pain as constant with waves of more intense pain. She has had intermittent RUQ abdominal pain since March, but never to this severity. This pain has been associated in the past with eating greasy foods. She has had a RUQ US done outpatient by her PCP which showed cholelithiasis. She denies any recent sick contacts. She denies any urinary symptoms. She has no history of previous abdominal surgeries. She is not taking any antiplatelet or anticoagulation medications. Past Medical History:        Diagnosis Date    Asthma     Chicken pox     Degenerative disc disease, lumbar     Guillain Barré syndrome ([facility] [address] 8/4/2016    Hyperlipidemia     Kidney disease     Meniere's disease     Seizure ([facility] [address]     none since 2013    Trimalleolar fracture of ankle, closed, right, initial encounter 2/27/2018    Viral hepatitis        Past Surgical History:        Procedure Laterality Date    ANKLE FRACTURE SURGERY Right 03/02/2018    orif trimalleolar -rods/pins    TONSILLECTOMY AND ADENOIDECTOMY  1994    [place]IS[place] TOOTH EXTRACTION         Allergies:  Cortisone, Lamictal [lamotrigine], Onion, Oxcarbazepine, Penicillins, Keppra [levetiracetam], Promethazine, Sulfa antibiotics, Adhesive tape, Azithromycin, and Gabapentin    Medications:   Home Meds  No current facility-administered medications on file prior to encounter.      Current Outpatient Medications on File Prior to Encounter   Medication Sig Dispense Refill    triamterene-hydroCHLOROthiazide (DYAZIDE) 37.5-25 MG per capsule TAKE 1 CAPSULE BY MOUTH EVERY DAY 90 capsule 0    amLODIPine (NORVASC) 5 MG tablet TAKE 1 TABLET BY MOUTH EVERY DAY 30 tablet 0    vitamin D3 (CHOLECALCIFEROL) 25 MCG (1000 UT) TABS tablet TAKE 1 TABLET BY MOUTH EVERY DAY 90 tablet 0    meloxicam (MOBIC) 15 MG tablet TAKE 1 TABLET BY MOUTH EVERY DAY 90 tablet 0    norgestimate-ethinyl estradiol (ORTHO-CYCLEN) 0.25-35 MG-MCG per tablet TAKE 1 TABLET BY MOUTH EVERY DAY 84 tablet 3    traZODone (DESYREL) 50 MG tablet Take 1-2 tablets 20-30 minutes before bed nightly as needed 90 tablet 1    SUMAtriptan (IMITREX) 50 MG tablet Take 1 tablet by mouth once as needed for Migraine 27 tablet 1    sertraline (ZOLOFT) 100 MG tablet Take 1 tablet by mouth daily 90 tablet 3    clobetasol (TEMOVATE) 0.05 % cream Apply topically 2 times daily. 30 g 1    albuterol sulfate  (90 Base) MCG/ACT inhaler INHALE 2 PUFFS BY MOUTH EVERY SIX HOURS AS NEEDED FOR WHEEZING 8.5 g 0       Current Meds  ondansetron (ZOFRAN) injection 4 mg, Once  HYDROmorphone (DILAUDID) injection 1 mg, Once        Family History:   Family History   Problem Relation Age of Onset    High Cholesterol Father     Diabetes Paternal Grandmother     High Cholesterol Paternal Grandmother     Diabetes Paternal Grandfather     High Cholesterol Paternal Grandfather     Cancer Paternal Grandfather         colon    Arthritis Mother     Depression Mother     Depression Sister     Schizophrenia Sister     Heart Defect Brother     Mult Sclerosis Paternal Aunt        Social History:   TOBACCO:   reports that she has never smoked. She has never used smokeless tobacco.  ETOH:   reports current alcohol use. DRUGS:   reports current drug use. Review of Systems:   A 14 point review of systems was conducted, significant findings as noted in HPI. All other systems negative.      Physical exam:    Vitals:    12/12/22 1411 12/12/22 1600   BP: (!) 147/82    Pulse: (!) 116 (!) 106   Resp: 16 16   Temp: 98.1 °F (36.7 °C)    TempSrc: Oral    SpO2: 97%    Weight: 195 lb (88.5 kg)    Height: 5' 5\" (1.651 m)        General appearance: alert, no acute distress, grooming appropriate  Eyes: No scleral icterus, EOM grossly intact  Neck: trachea midline, neck supple  Chest/Lungs: normal effort with no accessory muscle use on RA  Cardiovascular: RRR, well perfused  Abdomen: Obese, moderate tenderness in the RUQ and Epigastric region, no rebound, guarding, or rigidity  Skin: warm and dry, no rashes  Extremities: no edema, no cyanosis  Genitourinary: Grossly normal  Neuro: A&Ox3, no focal deficits, sensation intact    Labs:    CBC:   Recent Labs     12/12/22  1532   WBC 16.3*   HGB 15.5   HCT 44.8   MCV 84.1        BMP:   Recent Labs     12/12/22  1532   *   K 3.6      CO2 18*   BUN 13   CREATININE <0.5*     PT/INR: No results for input(s): PROTIME, INR in the last 72 hours. APTT: No results for input(s): APTT in the last 72 hours.   Liver Profile:   Lab Results   Component Value Date/Time    AST 14 12/12/2022 03:32 PM    ALT 11 12/12/2022 03:32 PM    BILIDIR 0.1 09/03/2014 05:04 PM    BILITOT <0.2 12/12/2022 03:32 PM    ALKPHOS 77 12/12/2022 03:32 PM     Lab Results   Component Value Date/Time    CHOL 204 07/01/2020 12:15 PM    HDL 42 07/01/2020 12:15 PM    HDL 56 02/08/2012 10:23 AM    TRIG 292 07/01/2020 12:15 PM     UA:   Lab Results   Component Value Date/Time    NITRITE Negative 06/30/2020 02:34 PM    COLORU Yellow 12/12/2022 03:32 PM    PHUR 6.5 12/12/2022 03:32 PM    WBCUA 0-2 12/12/2022 03:32 PM    RBCUA None seen 12/12/2022 03:32 PM    BACTERIA 3+ 12/12/2022 03:32 PM    CLARITYU CLOUDY 12/12/2022 03:32 PM    SPECGRAV 1.025 12/12/2022 03:32 PM    LEUKOCYTESUR Negative 12/12/2022 03:32 PM    UROBILINOGEN 0.2 12/12/2022 03:32 PM    BILIRUBINUR Negative 12/12/2022 03:32 PM    BILIRUBINUR Negative 06/30/2020 02:34 PM    BILIRUBINUR Negative 05/10/2012 11:09 AM    BLOODU TRACE-INTACT 12/12/2022 03:32 PM    GLUCOSEU Negative 12/12/2022 03:32 PM    GLUCOSEU Negative 05/10/2012 11:09 AM    AMORPHOUS 1+ 12/12/2022 03:32 PM       Imaging:   CT ABDOMEN PELVIS W IV CONTRAST Additional Contrast? None   Final Result      No acute findings in the abdomen or pelvis. Assessment/Plan: This is a 30 y.o. female with a single day history of RUQ and epigastric abdominal pain, nausea, vomiting, and diarrhea. CT obtained in the ED showed a distended gallbladder without evidence of stones. Her labs today are notable for a WBC of 16.3. Her LFTs are within normal limits. She is afebrile, HDS, and does not require acute surgical intervention at this time. - Admit to surgery   - IV cefepime/flagyl.  Patient has anaphylactic reaction to PCN    - IV fluids   - Okay for clears until midnight, then NPO   - RUQ US ordered for the morning   - Will pre-op and consent for tentative cholecystectomy tomorrow, pending results of RUQ US   - Patient seen with senior resident and staffed with Dr. James Shepherd MD  12/12/22  8:03 PM

## 2022-12-13 NOTE — PROGRESS NOTES
Patient admitted to 3306 from ED. A&Ox4. C/o mild abdominal pain. C/o mild nausea. No SOB. RA, sat 97%. Lungs clear. Skin intact. IVF infusing per left AC PIV as ordered. Up ad serina to bathroom. Gait steady, no alarm needed. No needs at this time. Patient resting comfortably in bed. Call light in reach. Will continue to monitor.    Electronically signed by Karol Cormier RN on 12/12/2022 at 11:17 PM

## 2022-12-13 NOTE — BRIEF OP NOTE
Brief Postoperative Note      Patient: Evens Bardales  YOB: 1985  MRN: [id number]    Date of Procedure: 12/13/2022    Pre-Op Diagnosis: Acute cholecystitis [K81.0]    Post-Op Diagnosis: Same       Procedure(s):  ROBOTIC CHOLECYSTECTOMY    Surgeon(s):   Angelica Rodriguez DO    Assistant:  Surgical Assistant: Sajan Campos  Resident: Scott Loza MD    Anesthesia: General    Estimated Blood Loss (mL): Minimal    Complications: None    Specimens:   ID Type Source Tests Collected by Time Destination   A : GALLBLADDER Tissue Tissue SURGICAL PATHOLOGY Angelica Rodriguez DO 12/13/2022 1741        Implants:  * No implants in log *      Drains: * No LDAs found *    Findings: Robotic-assisted lap cholecystectomy    Electronically signed by Scott Loza MD on 12/13/2022 at 5:56 PM

## 2022-12-13 NOTE — ED PROVIDER NOTES
[address] [facility] RESIDENT NOTE     Date of evaluation: 12/12/2022    ADDENDUM:      Care of this patient was assumed from Dr. Jannet Tsai . The patient was seen for Abdominal Pain (Upper rt abd pain that \"shoots out\" started 0800 this morning, n/v/d). The patient's initial evaluation and plan have been discussed with the prior provider who initially evaluated the patient. Nursing Notes, Past Medical Hx, Past Surgical Hx, Social Hx, Allergies, and Family Hx were all reviewed. Diagnostic Results     RADIOLOGY:  CT ABDOMEN PELVIS W IV CONTRAST Additional Contrast? None   Final Result      No acute findings in the abdomen or pelvis.       US GALLBLADDER RUQ    (Results Pending)       LABS:   Results for orders placed or performed during the hospital encounter of 12/12/22   CBC with Diff   Result Value Ref Range    WBC 16.3 (H) 4.0 - 11.0 K/uL    RBC 5.32 (H) 4.00 - 5.20 M/uL    Hemoglobin 15.5 12.0 - 16.0 g/dL    Hematocrit 44.8 36.0 - 48.0 %    MCV 84.1 80.0 - 100.0 fL    MCH 29.1 26.0 - 34.0 pg    MCHC 34.6 31.0 - 36.0 g/dL    RDW 13.4 12.4 - 15.4 %    Platelets 978 476 - 313 K/uL    MPV 7.6 5.0 - 10.5 fL    Neutrophils % 93.7 %    Lymphocytes % 3.6 %    Monocytes % 1.6 %    Eosinophils % 0.8 %    Basophils % 0.3 %    Neutrophils Absolute 15.3 (H) 1.7 - 7.7 K/uL    Lymphocytes Absolute 0.6 (L) 1.0 - 5.1 K/uL    Monocytes Absolute 0.3 0.0 - 1.3 K/uL    Eosinophils Absolute 0.1 0.0 - 0.6 K/uL    Basophils Absolute 0.1 0.0 - 0.2 K/uL   CMP w/ Reflex to MG   Result Value Ref Range    Sodium 134 (L) 136 - 145 mmol/L    Potassium reflex Magnesium 3.6 3.5 - 5.1 mmol/L    Chloride 100 99 - 110 mmol/L    CO2 18 (L) 21 - 32 mmol/L    Anion Gap 16 3 - 16    Glucose 105 (H) 70 - 99 mg/dL    BUN 13 7 - 20 mg/dL    Creatinine <0.5 (L) 0.6 - 1.1 mg/dL    Est, Glom Filt Rate >60 >60    Calcium 8.6 8.3 - 10.6 mg/dL    Total Protein 7.4 6.4 - 8.2 g/dL    Albumin 4.4 3.4 - 5.0 g/dL Albumin/Globulin Ratio 1.5 1.1 - 2.2    Total Bilirubin <0.2 0.0 - 1.0 mg/dL    Alkaline Phosphatase 77 40 - 129 U/L    ALT 11 10 - 40 U/L    AST 14 (L) 15 - 37 U/L   Lipase   Result Value Ref Range    Lipase 33.0 13.0 - 60.0 U/L   Urinalysis with Reflex to Culture    Specimen: Urine   Result Value Ref Range    Color, UA Yellow Straw/Yellow    Clarity, UA CLOUDY (A) Clear    Glucose, Ur Negative Negative mg/dL    Bilirubin Urine Negative Negative    Ketones, Urine 15 (A) Negative mg/dL    Specific Gravity, UA 1.025 1.005 - 1.030    Blood, Urine TRACE-INTACT (A) Negative    pH, UA 6.5 5.0 - 8.0    Protein,  (A) Negative mg/dL    Urobilinogen, Urine 0.2 <2.0 E.U./dL    Nitrite, Urine Negative Negative    Leukocyte Esterase, Urine Negative Negative    Microscopic Examination YES     Urine Type Voided     Urine Reflex to Culture Not Indicated    Microscopic Urinalysis   Result Value Ref Range    WBC, UA 0-2 0 - 5 /HPF    RBC, UA None seen 0 - 4 /HPF    Renal Epithelial, UA 21-50 (A) 0 - 1 /HPF    Bacteria, UA 3+ (A) None Seen /HPF    Amorphous, UA 1+ /HPF   HCG Qualitative, Serum   Result Value Ref Range    hCG Qual Negative Detects HCG level >10 MIU/mL       RECENT VITALS:  BP: (!) 149/81, Temp: 98.1 °F (36.7 °C), Heart Rate: 86, Resp: 17, SpO2: 97 %     Procedures     none    ED Course     ED Course as of 12/12/22 2146   Mon Dec 12, 2022   1602 HCG Qualitative, Serum [SR]   1631 HCG Qualitative, Serum [SR]      ED Course User Index  [SR] Veronique Medina MD       The patient was given the following medications:  Orders Placed This Encounter   Medications    ondansetron (ZOFRAN) injection 4 mg    DISCONTD: ketorolac (TORADOL) injection 30 mg    DISCONTD: HYDROmorphone (DILAUDID) injection 0.25 mg    HYDROmorphone (DILAUDID) injection 0.25 mg    0.9 % sodium chloride bolus    iopamidol (ISOVUE-370) 76 % injection 75 mL    DISCONTD: droperidol (INAPSINE) injection 1.25 mg    droperidol (INAPSINE) injection 1.25 mg    ondansetron (ZOFRAN) injection 4 mg    HYDROmorphone (DILAUDID) injection 1 mg    sodium chloride flush 0.9 % injection 5-40 mL    sodium chloride flush 0.9 % injection 5-40 mL    0.9 % sodium chloride infusion    OR Linked Order Group     ondansetron (ZOFRAN-ODT) disintegrating tablet 4 mg     ondansetron (ZOFRAN) injection 4 mg    enoxaparin (LOVENOX) injection 40 mg     Order Specific Question:   Indication of Use     Answer:   Prophylaxis-DVT/PE    OR Linked Order Group     HYDROmorphone (DILAUDID) injection 0.5 mg     HYDROmorphone (DILAUDID) injection 1 mg    OR Linked Order Group     oxyCODONE (ROXICODONE) immediate release tablet 5 mg     oxyCODONE (ROXICODONE) immediate release tablet 10 mg    acetaminophen (TYLENOL) tablet 650 mg    lactated ringers infusion       CONSULTS:  IP CONSULT TO Claude Kelley / NORIS / Emmett Party is a 30 y.o. female who presents with RUQ abd pain. Since I assumed care for this patient, she had received a CT Abd Pelvis w IV contrast that revealed gallbladder distension without apparent inflammation or ductal dilation or stone visible on CT, no other acute abnormalities. The patient continued to endorse nausea and significant RUQ pain. General surgery paged, plan to admit patient for further workup and possible AM procedure. This patient was also evaluated by the attending physician. All care plans were discussed and agreed upon. Clinical Impression     1. Right upper quadrant abdominal pain    2. Nausea and vomiting, unspecified vomiting type        Disposition     PATIENT REFERRED TO:  No follow-up provider specified.       DISCHARGE MEDICATIONS:  New Prescriptions    No medications on file       DISPOSITION Admitted 12/12/2022 09:29:45 PM    Thai Rogers MD   Internal Medicine PGY-1       Thai Rogers MD  Resident  12/12/22 1773

## 2022-12-13 NOTE — ANESTHESIA PRE PROCEDURE
Department of Anesthesiology  Preprocedure Note       Name:  Kerry Cortes   Age:  30 y.o.  :  1985                                          MRN:  [id number]         Date:  2022      Surgeon: Michael Atkinson): Skye Johnson DO    Procedure: Procedure(s):  CHOLECYSTECTOMY LAPAROSCOPIC ROBOTIC    Medications prior to admission:   Prior to Admission medications    Medication Sig Start Date End Date Taking? Authorizing Provider   triamterene-hydroCHLOROthiazide (DYAZIDE) 37.5-25 MG per capsule TAKE 1 CAPSULE BY MOUTH EVERY DAY 22   Brigido Sawyer MD   amLODIPine (NORVASC) 5 MG tablet TAKE 1 TABLET BY MOUTH EVERY DAY  Patient taking differently: Take 5 mg by mouth at bedtime TAKE 1 TABLET BY MOUTH EVERY DAY 22   Wesley Abbasi DO   vitamin D3 (CHOLECALCIFEROL) 25 MCG (1000 UT) TABS tablet TAKE 1 TABLET BY MOUTH EVERY DAY  Patient taking differently: Take 1,000 Units by mouth at bedtime 22   Wesley Abbasi DO   meloxicam (MOBIC) 15 MG tablet TAKE 1 TABLET BY MOUTH EVERY DAY  Patient taking differently: Take 15 mg by mouth at bedtime 22   Brigido Sawyer MD   norgestimate-ethinyl estradiol (ORTHO-CYCLEN) 0.25-35 MG-MCG per tablet TAKE 1 TABLET BY MOUTH EVERY DAY 22   Court Burrows MD   traZODone (DESYREL) 50 MG tablet Take 1-2 tablets 20-30 minutes before bed nightly as needed  Patient not taking: Reported on 2022   Brigido Sawyer MD   SUMAtriptan (IMITREX) 50 MG tablet Take 1 tablet by mouth once as needed for Migraine 22  Brigido Sawyer MD   sertraline (ZOLOFT) 100 MG tablet Take 1 tablet by mouth daily 22   Brigido Sawyer MD   clobetasol (TEMOVATE) 0.05 % cream Apply topically 2 times daily. Patient taking differently: 2 times daily as needed Apply topically 2 times daily.  22   Brigido Sawyer MD   albuterol sulfate  (90 Base) MCG/ACT inhaler INHALE 2 PUFFS BY MOUTH EVERY SIX HOURS AS NEEDED FOR WHEEZING 21 Melissa Brennan MD       Current medications:    Current Facility-Administered Medications   Medication Dose Route Frequency Provider Last Rate Last Admin    sodium chloride flush 0.9 % injection 5-40 mL  5-40 mL IntraVENous 2 times per day Talon Ybarra MD   10 mL at 12/12/22 2249    sodium chloride flush 0.9 % injection 5-40 mL  5-40 mL IntraVENous PRN Talon Ybarra MD        0.9 % sodium chloride infusion   IntraVENous PRN Talon Ybarra MD        ondansetron (ZOFRAN-ODT) disintegrating tablet 4 mg  4 mg Oral Q8H PRN Talon Ybarra MD        Or    ondansetron [facility]) injection 4 mg  4 mg IntraVENous Q6H PRN Talon Ybarra MD        enoxaparin (LOVENOX) injection 40 mg  40 mg SubCUTAneous Daily Talon Ybarra MD   40 mg at 12/13/22 0845    HYDROmorphone (DILAUDID) injection 0.5 mg  0.5 mg IntraVENous Q3H PRN Talon Ybarra MD        Or    HYDROmorphone (DILAUDID) injection 1 mg  1 mg IntraVENous Q3H PRN Talon Ybarra MD        oxyCODONE (ROXICODONE) immediate release tablet 5 mg  5 mg Oral Q4H PRN Talon Ybarra MD   5 mg at 12/13/22 8743    Or    oxyCODONE (ROXICODONE) immediate release tablet 10 mg  10 mg Oral Q4H PRN Talon Ybarra MD        acetaminophen (TYLENOL) tablet 650 mg  650 mg Oral Q4H PRN Talon Ybarra MD   650 mg at 12/13/22 0845    lactated ringers infusion   IntraVENous Continuous Talon Ybarra MD   Stopped at 12/12/22 2258    metronidazole (FLAGYL) 500 mg in 0.9% NaCl 100 mL IVPB premix  500 mg IntraVENous Oseas Jewell MD   Stopped at 12/13/22 1548    cefepime (MAXIPIME) 2000 mg IVPB minibag  2,000 mg IntraVENous Q12H Talon Ybarra MD   Stopped at 12/13/22 1411     Facility-Administered Medications Ordered in Other Encounters   Medication Dose Route Frequency Provider Last Rate Last Admin    lactated ringers infusion   IntraVENous Continuous PRN Kristene Carolina   New Bag at 12/13/22 1607    midazolam (VERSED) injection   IntraVENous PRN Kristene Carolina   2 mg at 12/13/22 1609    HYDROmorphone (DILAUDID) injection   IntraVENous PRN Kristene [place]   2 mg at 12/13/22 1609    fentaNYL (SUBLIMAZE) injection   IntraVENous PRN [place] Yevgeniy   100 mcg at 12/13/22 1609    lidocaine (cardiac) (XYLOCAINE) injection   IntraVENous PRN [place] Evaristo Yevgeniy   100 mg at 12/13/22 1612    propofol injection   IntraVENous PRN Mar[place] C Yevgeniy   100 mg at 12/13/22 1614    rocuronium (ZEMURON) injection   IntraVENous PRN [place] Evaristo Yevgeniy   100 mg at 12/13/22 1615    dexamethasone (DECADRON) injection   IntraVENous PRN Rachel Evaristo Yevgeniy   8 mg at 12/13/22 1622    ondansetron (ZOFRAN) injection   IntraVENous PRN [place] Evaristo Yevgeniy   4 mg at 12/13/22 1622    famotidine (PEPCID) injection   IntraVENous PRN Kristene Carolina   20 mg at 12/13/22 1622    glycopyrrolate (ROBINUL) injection   IntraVENous PRN Rachel Evaristo Yevgeniy   0.3 mg at 12/13/22 1640    phenylephrine (VAZCULEP) injection   IntraVENous PRN Rachel Evaristo Yevgeniy   200 mcg at 12/13/22 1640       Allergies:     Allergies   Allergen Reactions    Cortisone Anaphylaxis     fulll blackout for 5 minutes, w/ smelling salts to come to consciousness    Lamictal [Lamotrigine] Rash    Onion Anaphylaxis and Other (See Comments)     Red onion    Oxcarbazepine Rash    Penicillins Anaphylaxis    Keppra [Levetiracetam] Seizure    Promethazine Seizure    Sulfa Antibiotics Rash    Adhesive Tape Rash    Azithromycin Diarrhea    Gabapentin Anxiety, Other (See Comments) and Hallucinations     Suicidal thoughts       Problem List:    Patient Active Problem List   Diagnosis Code    Hyperlipemia E78.5    Allergic rhinitis due to pollen J30.1    Eczema L30.9    Meniere disease H81.09    Vitamin D deficiency E55.9    Degenerative disc disease, lumbar M51.36    Discogenic low back pain M51.36    LBBB (left bundle branch block) I44.7    Ataxia of both legs R27.0    Major depressive disorder, recurrent, in partial remission ([facility]) F33.41    Anxiety disorder F41.9    Trimalleolar fracture of ankle, closed, right, sequela S82.851S    Plantar fasciitis, right M72.2    Ankle arthritis M19.079    Painful orthopaedic hardware ([facility]) T84.84XA    Mild persistent asthma, uncomplicated N25.70    Acute cholecystitis K81.0       Past Medical History:        Diagnosis Date    Asthma     Chicken pox     Degenerative disc disease, lumbar     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    Hyperlipidemia     Kidney disease     Meniere's disease     Seizure ([facility] [address]     none since 2013    Trimalleolar fracture of ankle, closed, right, initial encounter 2/27/2018    Viral hepatitis        Past Surgical History:        Procedure Laterality Date    ANKLE FRACTURE SURGERY Right 03/02/2018    orif trimalleolar -rods/pins    TONSILLECTOMY AND ADENOIDECTOMY  1994    WISDOM TOOTH EXTRACTION         Social History:    Social History     Tobacco Use    Smoking status: Never    Smokeless tobacco: Never   Substance Use Topics    Alcohol use:  Yes     Alcohol/week: 0.0 standard drinks     Comment: rare celebratory                                Counseling given: Not Answered      Vital Signs (Current):   Vitals:    12/12/22 2254 12/13/22 0554 12/13/22 0834 12/13/22 1232   BP: (!) 148/87 (!) 146/92 128/84 129/76   Pulse: 98 96 88 80   Resp: 17 18 16 15   Temp: 97.9 °F (36.6 °C) 98.1 °F (36.7 °C) 98.4 °F (36.9 °C) 97.9 °F (36.6 °C)   TempSrc: Oral Oral Oral Oral   SpO2: 98% 98% 98% 98%   Weight: 201 lb 15.1 oz (91.6 kg)      Height: 5' 5\" (1.651 m)                                                 BP Readings from Last 3 Encounters:   12/13/22 129/76   09/08/22 128/77   03/22/22 (!) 156/94       NPO Status:                                                                                 BMI:   Wt Readings from Last 3 Encounters:   12/12/22 201 lb 15.1 oz (91.6 kg)   09/08/22 198 lb (89.8 kg)   03/22/22 187 lb 3.2 oz (84.9 kg)     Body mass index is 33.6 kg/m². CBC:   Lab Results   Component Value Date/Time    WBC 7.0 12/13/2022 06:19 AM    RBC 4.71 12/13/2022 06:19 AM    HGB 13.8 12/13/2022 06:19 AM    HCT 40.5 12/13/2022 06:19 AM    MCV 86.1 12/13/2022 06:19 AM    RDW 13.2 12/13/2022 06:19 AM     12/13/2022 06:19 AM       CMP:   Lab Results   Component Value Date/Time     12/13/2022 06:19 AM    K 3.3 12/13/2022 06:19 AM    K 3.6 12/12/2022 03:32 PM     12/13/2022 06:19 AM    CO2 25 12/13/2022 06:19 AM    BUN 7 12/13/2022 06:19 AM    CREATININE <0.5 12/13/2022 06:19 AM    GFRAA >60 03/22/2022 09:21 AM    GFRAA >60 03/30/2013 06:52 PM    AGRATIO 1.5 12/12/2022 03:32 PM    LABGLOM >60 12/13/2022 06:19 AM    GLUCOSE 103 12/13/2022 06:19 AM    GLUCOSE 80 07/08/2011 03:04 PM    PROT 6.2 12/13/2022 06:19 AM    PROT 7.4 05/04/2012 12:15 PM    CALCIUM 7.9 12/13/2022 06:19 AM    BILITOT <0.2 12/13/2022 06:19 AM    ALKPHOS 61 12/13/2022 06:19 AM    AST 10 12/13/2022 06:19 AM    ALT 9 12/13/2022 06:19 AM       POC Tests: No results for input(s): POCGLU, POCNA, POCK, POCCL, POCBUN, POCHEMO, POCHCT in the last 72 hours.     Coags: No results found for: PROTIME, INR, APTT    HCG (If Applicable):   Lab Results   Component Value Date    PREGTESTUR Negative 04/11/2021        ABGs: No results found for: PHART, PO2ART, OLD9CEC, CCQ8KSG, BEART, K5VJMROJ     Type & Screen (If Applicable):  No results found for: LABABO, LABRH    Drug/Infectious Status (If Applicable):  No results found for: HIV, HEPCAB    COVID-19 Screening (If Applicable): No results found for: COVID19        Anesthesia Evaluation    Airway: Mallampati: II  TM distance: >3 FB   Neck ROM: full  Mouth opening: > = 3 FB   Dental:          Pulmonary:   (+) asthma:                            Cardiovascular:            Rhythm: regular  Rate: normal                    Neuro/Psych:   (+) neuromuscular disease:, psychiatric history:            GI/Hepatic/Renal:   (+) hepatitis:, liver disease:,           Endo/Other:                     Abdominal:             Vascular: Other Findings:           Anesthesia Plan      general     ASA 2       Induction: intravenous. Anesthetic plan and risks discussed with patient. Plan discussed with CRNA.                     Jana Cardoza MD   12/13/2022

## 2022-12-13 NOTE — ANESTHESIA POSTPROCEDURE EVALUATION
Department of Anesthesiology  Postprocedure Note    Patient: Martine Stephen  MRN: [id number]  YOB: 1985  Date of evaluation: 12/13/2022      Procedure Summary     Date: 12/13/22 Room / Location: [address] [place] [address]    Anesthesia Start: 2605 Anesthesia Stop: 1805    Procedure: ROBOTIC CHOLECYSTECTOMY (Abdomen) Diagnosis:       Acute cholecystitis      (Acute cholecystitis [K81.0])    Surgeons: Ludivina Deleon DO Responsible Provider: Earnstine Saint, MD    Anesthesia Type: general ASA Status: 2          Anesthesia Type: No value filed.     El Phase I:      El Phase II:        Anesthesia Post Evaluation    Patient location during evaluation: PACU  Level of consciousness: awake  Complications: no  Multimodal analgesia pain management approach

## 2022-12-13 NOTE — PROGRESS NOTES
4 Eyes Skin Assessment     NAME:  Zhane Acuna  YOB: 1985  MEDICAL RECORD NUMBER:  [id number]    The patient is being assessed for  Admission    I agree that One RN have performed a thorough Head to Toe Skin Assessment on the patient. ALL assessment sites listed below have been assessed. Areas assessed by both nurses:    Head, Face, Ears, Shoulders, Back, Chest, Arms, Elbows, Hands, Sacrum. Buttock, Coccyx, Ischium, and Legs. Feet and Heels        Does the Patient have a Wound?  No noted wound(s)       Renard Prevention initiated by RN: No   Wound Care Orders initiated by RN: No    Pressure Injury (Stage 3,4, Unstageable, DTI, NWPT, and Complex wounds) if present place referral order by RN under : No    New and Established Ostomies, if present place, referral order under : No      Nurse 1 eSignature: Electronically signed by Anastasiia Li RN on 12/12/22 at 11:15 PM EST    **SHARE this note so that the co-signing nurse is able to place an eSignature**    Nurse 2 eSignature: Electronically signed by Rosalia Holden RN on 12/13/22 at 2:27 AM EST

## 2022-12-13 NOTE — PROGRESS NOTES
From OR sedated, with oral airway in place, incisions x 4 LEELEE with dermabond CDI, with abdominal binder in place, ice pack applied, VSS. Report from Dr. Joey Ruvalcaba, ISABELL and OR RN.     S/P ROBOTIC CHOLECYSTECTOMY

## 2022-12-14 VITALS
TEMPERATURE: 98.2 F | BODY MASS INDEX: 33.65 KG/M2 | RESPIRATION RATE: 16 BRPM | HEART RATE: 86 BPM | WEIGHT: 201.94 LBS | HEIGHT: 65 IN | DIASTOLIC BLOOD PRESSURE: 86 MMHG | OXYGEN SATURATION: 98 % | SYSTOLIC BLOOD PRESSURE: 136 MMHG

## 2022-12-14 LAB
ALBUMIN SERPL-MCNC: 4 G/DL (ref 3.4–5)
ALBUMIN SERPL-MCNC: 4 G/DL (ref 3.4–5)
ALP BLD-CCNC: 66 U/L (ref 40–129)
ALT SERPL-CCNC: 17 U/L (ref 10–40)
ANION GAP SERPL CALCULATED.3IONS-SCNC: 13 MMOL/L (ref 3–16)
AST SERPL-CCNC: 22 U/L (ref 15–37)
BASOPHILS ABSOLUTE: 0 K/UL (ref 0–0.2)
BASOPHILS RELATIVE PERCENT: 0.1 %
BILIRUB SERPL-MCNC: <0.2 MG/DL (ref 0–1)
BILIRUBIN DIRECT: <0.2 MG/DL (ref 0–0.3)
BILIRUBIN, INDIRECT: ABNORMAL MG/DL (ref 0–1)
BUN BLDV-MCNC: 7 MG/DL (ref 7–20)
CALCIUM SERPL-MCNC: 8.5 MG/DL (ref 8.3–10.6)
CHLORIDE BLD-SCNC: 101 MMOL/L (ref 99–110)
CO2: 23 MMOL/L (ref 21–32)
CREAT SERPL-MCNC: 0.5 MG/DL (ref 0.6–1.1)
EOSINOPHILS ABSOLUTE: 0 K/UL (ref 0–0.6)
EOSINOPHILS RELATIVE PERCENT: 0 %
GFR SERPL CREATININE-BSD FRML MDRD: >60 ML/MIN/{1.73_M2}
GLUCOSE BLD-MCNC: 124 MG/DL (ref 70–99)
HCT VFR BLD CALC: 41 % (ref 36–48)
HEMOGLOBIN: 14.2 G/DL (ref 12–16)
LYMPHOCYTES ABSOLUTE: 0.8 K/UL (ref 1–5.1)
LYMPHOCYTES RELATIVE PERCENT: 13.3 %
MAGNESIUM: 2.3 MG/DL (ref 1.8–2.4)
MCH RBC QN AUTO: 29.4 PG (ref 26–34)
MCHC RBC AUTO-ENTMCNC: 34.5 G/DL (ref 31–36)
MCV RBC AUTO: 85.3 FL (ref 80–100)
MONOCYTES ABSOLUTE: 0.2 K/UL (ref 0–1.3)
MONOCYTES RELATIVE PERCENT: 3.9 %
NEUTROPHILS ABSOLUTE: 5.1 K/UL (ref 1.7–7.7)
NEUTROPHILS RELATIVE PERCENT: 82.7 %
PDW BLD-RTO: 13.4 % (ref 12.4–15.4)
PHOSPHORUS: 3.3 MG/DL (ref 2.5–4.9)
PLATELET # BLD: 340 K/UL (ref 135–450)
PMV BLD AUTO: 7.4 FL (ref 5–10.5)
POTASSIUM SERPL-SCNC: 4.2 MMOL/L (ref 3.5–5.1)
RBC # BLD: 4.81 M/UL (ref 4–5.2)
SODIUM BLD-SCNC: 137 MMOL/L (ref 136–145)
TOTAL PROTEIN: 6.3 G/DL (ref 6.4–8.2)
WBC # BLD: 6.1 K/UL (ref 4–11)

## 2022-12-14 PROCEDURE — 94761 N-INVAS EAR/PLS OXIMETRY MLT: CPT

## 2022-12-14 PROCEDURE — 36415 COLL VENOUS BLD VENIPUNCTURE: CPT

## 2022-12-14 PROCEDURE — 83735 ASSAY OF MAGNESIUM: CPT

## 2022-12-14 PROCEDURE — 94664 DEMO&/EVAL PT USE INHALER: CPT

## 2022-12-14 PROCEDURE — 94150 VITAL CAPACITY TEST: CPT

## 2022-12-14 PROCEDURE — 80069 RENAL FUNCTION PANEL: CPT

## 2022-12-14 PROCEDURE — 85025 COMPLETE CBC W/AUTO DIFF WBC: CPT

## 2022-12-14 PROCEDURE — G0378 HOSPITAL OBSERVATION PER HR: HCPCS

## 2022-12-14 PROCEDURE — 6370000000 HC RX 637 (ALT 250 FOR IP): Performed by: STUDENT IN AN ORGANIZED HEALTH CARE EDUCATION/TRAINING PROGRAM

## 2022-12-14 PROCEDURE — 80076 HEPATIC FUNCTION PANEL: CPT

## 2022-12-14 RX ADMIN — OXYCODONE 5 MG: 5 TABLET ORAL at 07:05

## 2022-12-14 RX ADMIN — OXYCODONE 5 MG: 5 TABLET ORAL at 03:15

## 2022-12-14 ASSESSMENT — PAIN DESCRIPTION - LOCATION
LOCATION: ABDOMEN

## 2022-12-14 ASSESSMENT — PAIN DESCRIPTION - DESCRIPTORS: DESCRIPTORS: ACHING

## 2022-12-14 ASSESSMENT — PAIN DESCRIPTION - ORIENTATION
ORIENTATION: UPPER
ORIENTATION: MID

## 2022-12-14 ASSESSMENT — PAIN DESCRIPTION - PAIN TYPE: TYPE: ACUTE PAIN

## 2022-12-14 ASSESSMENT — PAIN DESCRIPTION - ONSET: ONSET: ON-GOING

## 2022-12-14 ASSESSMENT — PAIN - FUNCTIONAL ASSESSMENT: PAIN_FUNCTIONAL_ASSESSMENT: PREVENTS OR INTERFERES SOME ACTIVE ACTIVITIES AND ADLS

## 2022-12-14 ASSESSMENT — PAIN DESCRIPTION - FREQUENCY: FREQUENCY: INTERMITTENT

## 2022-12-14 ASSESSMENT — PAIN SCALES - GENERAL
PAINLEVEL_OUTOF10: 8
PAINLEVEL_OUTOF10: 5

## 2022-12-14 NOTE — PROGRESS NOTES
Surgery Daily Progress Note  Nicholas Acuna  CC:  RUQ abdominal and epigastric pain    Subjective : No overnight events, pain controlled on medications. VSS. Tolerated CLD post op and was advanced to regular low fat diet without n/v. Voiding, last BM yesterday pre-op     Objective    Infusions:   sodium chloride          I/O:I/O last 3 completed shifts: In: 1710.1 [P.O.:260; I.V.:1200; IV Piggyback:250.1]  Out: 910 [Urine:900; Blood:10]           Wt Readings from Last 1 Encounters:   12/12/22 201 lb 15.1 oz (91.6 kg)       LABS:    Recent Labs     12/13/22  0619 12/14/22  0508   WBC 7.0 6.1   HGB 13.8 14.2   HCT 40.5 41.0   MCV 86.1 85.3    340        Recent Labs     12/13/22  0619 12/14/22  0508   * 137   K 3.3* 4.2    101   CO2 25 23   PHOS 1.9* 3.3   BUN 7 7   CREATININE <0.5* 0.5*        Recent Labs     12/13/22  0619 12/14/22  0508   AST 10* 22   ALT 9* 17   BILIDIR <0.2 <0.2   BILITOT <0.2 <0.2   ALKPHOS 61 66          Recent Labs     12/12/22  1532   LIPASE 33.0        Recent Labs     12/13/22  0619 12/14/22  0508   PROT 6.2* 6.3*        No results for input(s): CKTOTAL, CKMB, CKMBINDEX, TROPONINI in the last 72 hours. Exam:/68   Pulse 93   Temp 97.6 °F (36.4 °C) (Oral)   Resp 16   Ht 5' 5\" (1.651 m)   Wt 201 lb 15.1 oz (91.6 kg)   LMP 12/12/2022   SpO2 97%   BMI 33.60 kg/m²     General appearance: alert, appears stated age and cooperative  Neck: trachea midline  Heart: regular rate and rhythm,   Lungs: unlabored on RA  Skin: warm and dry, no rashes  Extremities: no edema, no cyanosis  Abdomen: soft, non distended, appropriately tender, incisions well approximated with skin glue     ASSESSMENT/PLAN: This is a 30 y.o. female with s/o RUQ and epigastric abdominal pain, nausea, vomiting, and diarrhea. CT obtained in the ED showed a distended gallbladder without evidence of stones. Labs on admit notable for a WBC of 16. 3.s/p robotic cholecystectomy 12/13, POD#1     - Diet: Continue regular low fat   - Continue ambulation, OOB, IS, cough and deep breathe  - DVT prophylaxis: lovenox   - Dispo: home today     KEV Ramos CNP 12/14/2022 6:35 AM   Available via [employer] [phone]

## 2022-12-14 NOTE — OP NOTE
Robotic Cholecystectomy with Firefly Procedure Note     Indications: This patient presents with symptomatic gallbladder disease and will undergo robotic cholecystecomy. Pre-operative Diagnosis:   1.) Acute Calculous Cholecystitis     Post-operative Diagnosis: Same as pre-op     Procedures:  1- Robotic Cholecystectomy with Firefly      Surgeon: Nick Rodrigues     Assistant: Jemima Hewitt     Anesthesia: General endotracheal anesthesia     Procedure Details    The patient was seen again in the Holding Room. The risks, benefits, complications, treatment options, and expected outcomes were discussed with the patient. The possibilities of reaction to medication, pulmonary aspiration, perforation of viscus, bleeding, recurrent infection, finding a normal gallbladder, the need for additional procedures, failure to diagnose a condition, the possible need to convert to an open procedure, and creating a complication requiring transfusion or operation were discussed with the patient. The patient concurred with the proposed plan, giving informed consent. The site of surgery properly noted/marked. The patient was taken to Operating Room, properly identified and the procedure verified as Robotic assisted Laparoscopic Cholecystectomy. A Time Out was held and the above information confirmed. Prior to the induction of general anesthesia, antibiotic prophylaxis was administered. SCD's in place. General endotracheal anesthesia was then administered and tolerated well. After the induction, the abdomen was prepped in the usual sterile fashion. The patient was positioned in the supine position with the arms comfortably positioned, along with some reverse trendelenberg. Incision was made at Zuniga's point in the left midclavicular line, Veress needle technique was used to achieve pneumoperitoneum which was then created with CO2 and tolerated well without any adverse changes in the patient's vital signs.  Additional trocars were introduced under direct vision. All skin incisions were infiltrated with a local anesthetic agent before making the incision and placing the trocars, adhesions taken down with both blunt and sharp dissection, and robot was docked. The gallbladder was identified, the fundus grasped and retracted cephalad. Adhesions were lysed bluntly and with the electrocautery where indicated, taking care not to injure any adjacent organs or viscus. The infundibulum was grasped and retracted laterally, exposing the peritoneum overlying the triangle of Calot. This was then divided and exposed in a blunt fashion. The cystic duct was clearly identified and bluntly dissected circumferentially. The junctions of the gallbladder, cystic duct and common bile duct were clearly identified prior to the division of any linear structure and Critical View of safety was obtained. The 3 tenets of the critical view of safety that were achieved were as follows:     1.) The hepatocystic triangle was cleared of all fat and fibrous tissue  2.) The lower one-third of the gallbladder was  from the liver to expose the cystic plate  3.) Two and only two structures were seen to be going into the gallbladder      The cystic duct was then doubly ligated with surgical clips on the patient side and singly clipped on the gallbladder side and divided. The cystic artery was identified, dissected free, ligated with clips and divided as well. The gallbladder was dissected from the liver bed in retrograde fashion with the electrocautery. The gallbladder was removed using the endo-catch bag. The liver bed was irrigated and inspected and hemostatic. Copious irrigation was utilized and was repeatedly aspirated until clear. Fascial closure device used to close the fascia at the extraction site. Pneumoperitoneum was completely reduced after viewing removal of the trocars under direct vision.  The wound was thoroughly irrigated the skin was then closed with 4.0 vicryl in subcuticular fashion, and dermabond was applied. Instrument, sponge, and needle counts were correct at closure and at the conclusion of the case. Findings:  Gallbladder with inflammation     Estimated Blood Loss: 15 cc      Drains: none      Total IV Fluids: 1000 ml      Specimens: Gallbladder      Complications:  None; patient tolerated the procedure well. Disposition: PACU - hemodynamically stable. Condition: stable     Attending Attestation: I was present and scrubbed for the entire procedure.

## 2022-12-14 NOTE — DISCHARGE SUMMARY
Physician Discharge Summary     Patient ID:  Porsha Esparza  [id number]  39 y.o.  1985    Admit date: 12/12/2022    Discharge date: 12/14/2022    Admitting Physician: Hilario Jasmine DO     Discharge Physician: Hilario Jasmine DO     Admission Diagnoses: Acute cholecystitis [K81.0]  Right upper quadrant abdominal pain [R10.11]  Nausea and vomiting, unspecified vomiting type [R11.2]    Discharge Diagnoses: Acute Calculous Cholecystitis    Admission Condition: fair    Discharged Condition: good    Indication for Admission: Surgery:  ROBOTIC CHOLECYSTECTOMY    Hospital Course: 30 y.o. with history of seizure, GBS, HLD, viral hepatitis who was admitted with RUQ and epigastric abdominal pain. RUQ US done outpatient by her PCP which showed cholelithiasis. 16K WBC. CT obtained in the ED showed a distended gallbladder without evidence of stones. Repeat RUQ US showed Cholelithiasis. The patient underwent robotic cholecystectomy with Dr Nicholas Smith on 12/13/2022. Surgery was uneventful and the patient was admitted to post-operative surgical floor in stable condition for post-operative care including IV hydration, monitoring and pain and nausea management. The following morning the pain was tolerable on po pain medication, was taking adequate po, voiding freely. The patient was was discharged in stable condition. Treatments:  surgery    Disposition:  home    Patient Instructions:    Activity: activity as tolerated and no driving while on analgesics  Diet: regular diet low fat  Wound Care: as directed  Follow-up with Dr. Nicholas Smith in 1-2 weeks for post-operative follow up and wound check     Signed:  KEV Recinos - FAHAD

## 2022-12-14 NOTE — PLAN OF CARE
Problem: Pain  Goal: Verbalizes/displays adequate comfort level or baseline comfort level  12/14/2022 1104 by Jenny Goodwin RN  Outcome: Adequate for Discharge  Verbalizes/displays adequate comfort level or baseline comfort level:   Encourage patient to monitor pain and request assistance   Assess pain using appropriate pain scale     Problem: Discharge Planning  Goal: Discharge to home or other facility with appropriate resources  12/14/2022 1104 by Jenny Goodwin RN  Outcome: Adequate for Discharge  Discharge to home or other facility with appropriate resources:   Identify barriers to discharge with patient and caregiver   Arrange for needed discharge resources and transportation as appropriate

## 2022-12-14 NOTE — PROGRESS NOTES
Department of Surgery:  Post-op Note    CC: S/P robotic-assisted lap cholectstectomy    Subjective:   Pain is well-controlled at this time. Initially nauseated, but this has now resolved. Tolerating clear liquids. Yet to void since surgery. Objective:  Anesthesia type: General      I/O    Intra op    Post op     Fluids  1200 mL 400 mL     EBL 10 mL 0 mL     Urine 0 mL 0 mL     Physical Exam:  Vitals:    12/13/22 1900 12/13/22 1915 12/13/22 1930 12/13/22 2001   BP: 127/79 125/75 129/78 122/75   Pulse: 82 85 88 93   Resp: 15 17 18 16   Temp:   98.2 °F (36.8 °C) 97.9 °F (36.6 °C)   TempSrc:   Temporal Oral   SpO2: 96% 96% 96% 95%   Weight:       Height:           General appearance: alert, no acute distress, grooming appropriate  HEENT: Normocephalic, atraumatic; EOMI; moist mucous membranes  Neck: trachea midline, no JVD  Chest/Lungs: Normal inspiratory effort, symmetric chest rise, no accessory muscle use  Cardiovascular: Regular rate and rhythm; perfusing extremities  Abdomen: Soft, obese, appropriately tender to palpation, no rebound/guarding, incisions clean/dry/intact  Skin: warm and dry, no rashes  Extremities: no edema, no cyanosis  Neuro: A&Ox3, no gross sensory or motor neuro deficits    Assessment and Plan  Gerardo Jamil is a 30y.o. year old female with acute cholecystitis s/p robotic-assisted laparoscopic cholecystectomy on 12/13/22, POD #0. Pain management: Well-controlled on current pain regimen; continue prn Oxycodone & Dilaudid  Cardiovascular: Hemodynamically stable; will continue to monitor for changes in vital signs  Respiratory: extubated, encourage hourly incentive spirometry and deep breathing  FEN:  Fluids: 100cc/hr, Diet: CLD, low-fat diet in morning  : Void check by 02:00  Wound: Dermabond to incisions  Ambulation: OOB to chair, encourage ambulation  Prophylaxis: SCDs, Lovenox    Milton F. Mindy Canavan, MD  General Surgery, PGY-3  12/13/22  10:20 PM  [phone]

## 2022-12-14 NOTE — PROGRESS NOTES
Pt d/c'd 12/14/22. IV access removed with no complications. Reviewed d/c instructions, home meds, and f/u information utilizing teach-back method. Scripts for oxycodone and colace filled by outpatient pharmacy. Patient verbalized understanding. Patient assisted to lobby in wheelchair and left with all documented belongings.

## 2022-12-14 NOTE — PROGRESS NOTES
PACU Transfer Note    Vitals:    12/13/22 1930   BP: 129/78   Pulse: 88   Resp: 18   Temp: 98.2 °F (36.8 °C)   SpO2: 96%       In: 150 [I.V.:150]  Out: -     Pain assessment:    Pain Level: 4    Report given to Receiving unit SHAYNA Joshi.     12/13/2022 7:50 PM

## 2022-12-15 ENCOUNTER — CARE COORDINATION (OUTPATIENT)
Dept: CASE MANAGEMENT | Age: 37
End: 2022-12-15

## 2022-12-15 LAB
EKG ATRIAL RATE: 80 BPM
EKG DIAGNOSIS: NORMAL
EKG P AXIS: 17 DEGREES
EKG P-R INTERVAL: 152 MS
EKG Q-T INTERVAL: 400 MS
EKG QRS DURATION: 92 MS
EKG QTC CALCULATION (BAZETT): 461 MS
EKG R AXIS: -13 DEGREES
EKG T AXIS: 39 DEGREES
EKG VENTRICULAR RATE: 80 BPM

## 2022-12-15 PROCEDURE — 93010 ELECTROCARDIOGRAM REPORT: CPT | Performed by: INTERNAL MEDICINE

## 2022-12-15 NOTE — CARE COORDINATION
[facility] Care Transitions Initial Follow Up Call    Call within 2 business days of discharge:  yes     Care Transition Nurse contacted the patient by telephone to perform post hospital discharge assessment. Verified name with patient as identifier. Provided introduction to self, and explanation of the Care Transition Nurse role. Patient: Shoshana Peres Patient :  1985  MRN: [id number]  Reason for Admission:  CHOLECYSTECTOMY   Discharge Date:    RARS: 8      Last Discharge  Street       Date Complaint Diagnosis Description Type Department Provider    22 Abdominal Pain Right upper quadrant abdominal pain . .. ED to Hosp-Admission (Discharged) (ADMITTED) 97[address] Lacie Art DO; Bernie Sepulveda,. .. Challenges to be reviewed by the provider   Additional needs identified to be addressed with provider:  no    AMB CC Provider Discharge Needs:  none         Method of communication with provider : none      SUMMARY  CTC spoke to the Pt who reports the following:    -C/O productive cough with clear to white sputum. -C/O pain to surgical sites she rates 7/10. Pt states she last took Roxicodone 10 minutes prior to receiving the CTN's call.    -Pt confirms that surgical sites are free of s/s of infection.  -Hasn't had a BM since surgery. Reports she had diarrhea for days before the surgery. CTN encouraged pt to stay hydrated. -F/U with surgeon, Dr Sam Masterson, .  -Declined assistance with scheduling with PCP and states she will call. Pt denies fever, chills, nausea, vomiting,  congestion, SOB / DB, and chest pain. Pt declined to review all meds with CTC. Pt will take all meds as prescribed and schedule / keep doctor's appt. CTC provided education on s/s that require medical attention and when to seek medical attention. CTC advised Pt of use urgent care or physician's 24 hr access line if assistance is needed after hours or on the weekend.   Patient denies any needs or concerns and is agreeable with additional calls. Was this a readmission? No      Care Transition Nurse reviewed discharge instructions, medical action plan and red flags with patient who verbalized understanding. Patient given an opportunity to ask questions and does not have any further questions or concerns at this time. Were discharge instructions available to patient? Yes    Reviewed appropriate site of care based on symptoms and resources available to patient including:     PCP  Specialist  After hours contact number-  Benefits related nurse triage line  Urgent care clinics  [facility] 24/7  Provider home visits  When to call [address] [place] [address] for reactivation or family member permission [phone]  Condition related references. The patient agrees to contact the PCP office for questions related to their healthcare. The patient agrees to contact the PCP office for questions related to their healthcare. Advance Care Planning: Does patient have an Advance Directive: patient declined education    Pt verbalizes understanding of administration of home medications. Medications reviewed, 1111F entered: no    Was patient discharged with a pulse oximeter? No    Non-face-to-face services provided:  Education of patient/family/caregiver/guardian to support self-management-  Assessment and support for treatment adherence and medication management-    Offered patient enrollment in the Remote Patient Monitoring (RPM) program for in-home monitoring: Patient is not eligible for RPM program    Care Transitions 24 Hour Call    Care Transitions Interventions            Future Appointments   Date Time Provider Abelardo Cardona   12/28/2022  4:30 PM Shantelle Marie DO [facility]   9/12/2023  3:00 PM Benjamin Burden MD [facility] GYN [facility]         CTN provided contact information for future needs. Plan for f/u call in 5-7 days based on severity of symptoms and risk factors.     Plan for next call: symptom management  self-management  follow up appointment  medication management      Randal Cr Dears, BSN, RN  Care Transition Coordinator  Contact Number:  [phone]

## 2022-12-22 ENCOUNTER — CARE COORDINATION (OUTPATIENT)
Dept: CASE MANAGEMENT | Age: 37
End: 2022-12-22

## 2022-12-22 NOTE — CARE COORDINATION
[facility] Care Transitions Follow Up Call    Care Transition Nurse contacted the patient by telephone to perform post hospital discharge assessment. Verified name with patient as identifier. Provided introduction to self, and explanation of the Care Transition Nurse role. Patient: Jaun Erwin Patient :  1985  MRN: [id number]  Reason for Admission:  cholecystectomy   Discharge Date:     RARS: 8    Challenges to be reviewed by the provider   Additional needs identified to be addressed with provider:  no    AMB CC Provider Discharge Needs:  none         Method of communication with provider : none      SUMMARY  CTN spoke to the Pt who reports the following:    -No fever, chills, nausea, vomiting, cough, congestion, SOB / DB, and chest pain. -Denies issues with fluid intake, appetite, urination, and LBM was today. -Denies s/s of infection at surgical sites. -Reports pain at surgical site she rates 5/10. Taking OTC Tylenol but does not find it effective in treating her pain. Pt out of Roxicodone prescribed at d/c. Pt agreeable with contacting surgeon to discuss getting a refill or something to help with c/o pain. -F/U with surgeon .    -Declined HFU with PCP. Pt confirms they have all meds and taking as prescribed. From CDC: Are you at higher risk for severe illness? Wash your hands often. Avoid close contact (6 feet, which is about two arm lengths) w/people who are sick. Put distance between yourself and other people if COVID-19 is spreading in your community. Clean and disinfect frequently touched surfaces. Avoid all cruise travel and non-essential air travel. Call your healthcare professional if you have concerns about COVID-19 and your underlying condition or if you are sick. Pt will take all meds as prescribed and schedule / keep doctor's appt. [facility] provided education on s/s that require medical attention and when to seek medical attention.   [facility] advised Pt of use urgent care or physician's 24 hr access line if assistance is needed after hours or on the weekend. Patient denies any needs or concerns and is agreeable with additional calls. Addressed changes since last contact:  medications- Roxicodone / stopped     Discussed follow-up appointments. If no appointment was previously scheduled, appointment scheduling offered: Yes     Is follow up appointment scheduled within 7 days of discharge? No    Care Transitions 24 Hour Call    Do you have any ongoing symptoms?: Yes  Patient-reported symptoms: Pain  Interventions for patient-reported symptoms: Other  Were you discharged with any Home Care or Post Acute Services: No  Care Transitions Interventions         Non-[facility] follow up appointment(s):    Care Transition Nurse reviewed discharge instructions, medical action plan and red flags with patient and discussed any barriers to care and/or understanding of plan of care after discharge. Discussed appropriate site of care based on symptoms and resources available to patient including:    Specialist  After hours contact number  Benefits related nurse triage line  Urgent care clinics  [employer] 24/7  Provider home visits  When to call [address] [place] [address] for reactivation or family member permission [phone]. The patient agrees to contact the PCP office for questions related to their healthcare.     Advance Care Planning: Does patient have an Advance Directive: patient declined education    Patients top risk factors for readmission:   functional physical ability  lack of knowledge about disease  medical condition  medication management    Interventions to address risk factors:   Education of patient/family/caregiver/guardian to support self-management  Assessment and support for treatment adherence and medication management     Offered patient enrollment in the Remote Patient Monitoring (RPM) program for in-home monitoring: Patient is not eligible for RPM program    Care Transitions Subsequent and Final Call    Subsequent and Final Calls  Do you have any ongoing symptoms?: Yes  Patient-reported symptoms: Pain  Interventions for patient-reported symptoms: Other  Have your medications changed?: No  Do you have any questions related to your medications?: No  Do you currently have any active services?: No  Do you have any needs or concerns that I can assist you with?: No  Care Transitions Interventions  Other Interventions:              Care Transition Nurse provided contact information for future needs. Plan for f/u call in 7-10 days based on severity of symptoms and risk factors. Plan for next call:   symptom management  self-management  follow up appointment  medication management  Pain mgmt  Future Appointments   Date Time Provider Abelardo Cardona   12/28/2022  4:30 PM Ludivina Deleon DO [facility]   9/12/2023  3:00 PM Lily Zarate MD [facility] GYN [facility]       Hair Sommer.  KEVIN ThomasN, RN  Care Transition Coordinator  Contact Number:  [phone]

## 2022-12-28 ENCOUNTER — OFFICE VISIT (OUTPATIENT)
Dept: BARIATRICS/WEIGHT MGMT | Age: 37
End: 2022-12-28

## 2022-12-28 VITALS
SYSTOLIC BLOOD PRESSURE: 150 MMHG | DIASTOLIC BLOOD PRESSURE: 93 MMHG | TEMPERATURE: 98.2 F | HEART RATE: 80 BPM | WEIGHT: 196.2 LBS | HEIGHT: 65 IN | BODY MASS INDEX: 32.69 KG/M2

## 2022-12-28 DIAGNOSIS — K81.0 ACUTE CHOLECYSTITIS: Primary | ICD-10-CM

## 2022-12-28 PROCEDURE — 99024 POSTOP FOLLOW-UP VISIT: CPT | Performed by: SURGERY

## 2022-12-28 RX ORDER — METHOCARBAMOL 500 MG/1
500 TABLET, FILM COATED ORAL 4 TIMES DAILY
Qty: 20 TABLET | Refills: 0 | Status: SHIPPED | OUTPATIENT
Start: 2022-12-28 | End: 2023-01-02

## 2022-12-29 ENCOUNTER — CARE COORDINATION (OUTPATIENT)
Dept: CASE MANAGEMENT | Age: 37
End: 2022-12-29

## 2022-12-29 NOTE — CARE COORDINATION
[facility] Transitions Initial Follow Up Call    Patient:  Cong Morales Patient :  1985  MRN:  [id number]   Reason for Admission:  Lap Patrica  Discharge Date:  22  RARS: 8      Transitions of Care Initial Call    Was this an external facility discharge? no    Discharge Facility: [address] [place] [address] to be reviewed by the provider   Additional needs identified to be addressed with provider:    som    AMB CC Provider Discharge Needs: none            CTC attempt to reach Pt regarding recent hospital discharge. CTC left voice recording with call back number requesting a call back. Follow up appointments:    Future Appointments   Date Time Provider Abelardo Cardona   2023  3:00 PM Guanaco Kaye MD [facility] GYN EMIGDIO Damon, RN  Care Transition Coordinator  Contact Number:  [phone]

## 2022-12-30 NOTE — PROGRESS NOTES
Patient doing well  No N/V/F/C  Tolerating diet  Having regular BM's    Incisions C/D/I    S/P Cholecystectomy    No lifting more than 10 pounds until 6 weeks post-op    F/U PRN    Jaclyn Blanchard

## 2023-01-01 DIAGNOSIS — I10 ESSENTIAL HYPERTENSION: ICD-10-CM

## 2023-01-01 DIAGNOSIS — F41.1 GAD (GENERALIZED ANXIETY DISORDER): ICD-10-CM

## 2023-01-01 DIAGNOSIS — F33.1 MAJOR DEPRESSIVE DISORDER, RECURRENT, MODERATE (HCC): ICD-10-CM

## 2023-01-02 DIAGNOSIS — I10 ESSENTIAL HYPERTENSION: ICD-10-CM

## 2023-01-02 RX ORDER — SERTRALINE HYDROCHLORIDE 100 MG/1
TABLET, FILM COATED ORAL
Qty: 90 TABLET | Refills: 0 | Status: SHIPPED | OUTPATIENT
Start: 2023-01-02

## 2023-01-02 RX ORDER — TRIAMTERENE AND HYDROCHLOROTHIAZIDE 37.5; 25 MG/1; MG/1
CAPSULE ORAL
Qty: 90 CAPSULE | Refills: 0 | Status: SHIPPED | OUTPATIENT
Start: 2023-01-02

## 2023-01-03 RX ORDER — AMLODIPINE BESYLATE 5 MG/1
5 TABLET ORAL NIGHTLY
Qty: 90 TABLET | Refills: 3 | Status: SHIPPED | OUTPATIENT
Start: 2023-01-03

## 2023-01-03 NOTE — TELEPHONE ENCOUNTER
Medication:   Requested Prescriptions     Pending Prescriptions Disp Refills    amLODIPine (NORVASC) 5 MG tablet [Pharmacy Med Name: amLODIPine Besylate Oral Tablet 5 MG] 30 tablet 0     Sig: TAKE 1 TABLET BY MOUTH EVERY DAY        Last Filled:    11/21/22  Patient Phone Number: [phone] (home)     Last appt: 4/19/2022   Next appt: 1/1/2023    Last OARRS:   RX Monitoring 10/9/2017   Attestation The Prescription Monitoring Report for this patient was reviewed today. Periodic Controlled Substance Monitoring No signs of potential drug abuse or diversion identified.

## 2023-01-05 ENCOUNTER — CARE COORDINATION (OUTPATIENT)
Dept: CASE MANAGEMENT | Age: 38
End: 2023-01-05

## 2023-01-05 NOTE — CARE COORDINATION
[facility] Care Transitions Follow Up Call    Care Transition Nurse contacted the patient by telephone to perform post hospital discharge assessment. Verified name with patient as identifier. Provided introduction to self, and explanation of the Care Transition Nurse role. Patient: Vania Morelos Patient :  1985  MRN: [id number]  Reason for Admission:  cholecystectomy  Discharge Date:    RARS: 8    Challenges to be reviewed by the provider   Additional needs identified to be addressed with provider:     no    AMB CC Provider Discharge Needs:  none         Method of communication with provider :   none     SUMMARY  CTN spoke to the Pt who the following:    -No additional calls needed going forward. -Reports pain at surgical site that she describes as \"manageable\". -Denies s/s of infection at surgical site.   -Denies denies fever, chills, nausea, vomiting, cough, congestion, SOB / DB, and chest pain. -Denies issues with fluid intake, appetite, urination, and LBM today.  -Saw surgeon on  and reports that appt went well.  -No med changes since last conversation with CTN. -Declined HFU with pcp. Pt confirms they have all meds and taking as prescribed. From CDC: Are you at higher risk for severe illness? Wash your hands often. Avoid close contact (6 feet, which is about two arm lengths) w/people who are sick. Put distance between yourself and other people if COVID-19 is spreading in your community. Clean and disinfect frequently touched surfaces. Avoid all cruise travel and non-essential air travel. Call your healthcare professional if you have concerns about COVID-19 and your underlying condition or if you are sick. Pt will take all meds as prescribed and schedule / keep doctor's appt. [facility] provided education on s/s that require medical attention and when to seek medical attention.   [facility] advised Pt of use urgent care or physician's 24 hr access line if assistance is needed after hours or on the weekend. Patient denies any needs or concerns and is agreeable with additional calls. Addressed changes since last contact:  medications- see note     Discussed follow-up appointments. If no appointment was previously scheduled, appointment scheduling offered: Yes     Is follow up appointment scheduled within 7 days of discharge? No    Care Transitions 24 Hour Call    Care Transitions Interventions         Non-[facility] follow up appointment(s):    Care Transition Nurse reviewed discharge instructions, medical action plan and red flags with patient and discussed any barriers to care and/or understanding of plan of care after discharge. Discussed appropriate site of care based on symptoms and resources available to patient including:    Specialist  After hours contact number  Benefits related nurse triage line  Urgent care clinics  [facility] 24/7  When to call [address] [place] [address] for reactivation or family member permission [phone]. The patient agrees to contact the PCP office for questions related to their healthcare. Interventions to address risk factors:  Education of patient/family/caregiver/guardian to support self-management  Assessment and support for treatment adherence and medication management     Offered patient enrollment in the Remote Patient Monitoring (RPM) program for in-home monitoring: Patient is not eligible for RPM program    Care Transitions Subsequent and Final Call    Subsequent and Final Calls  Care Transitions Interventions  Other Interventions:              Care Transition Nurse provided contact information for future needs. Plan for f/u call in 7-10 days based on severity of symptoms and risk factors.     Plan for next call:   symptom management  self-management  follow up appointment  medication management       Future Appointments   Date Time Provider Abelardo Cardona   9/12/2023  3:00 PM Beltran Ross MD  GYN [facility] KEVIN LrN, RN  Care Transition Coordinator  Contact Number:  [phone]

## 2023-01-31 ENCOUNTER — HOSPITAL ENCOUNTER (EMERGENCY)
Age: 38
Discharge: HOME | End: 2023-01-31
Attending: EMERGENCY MEDICINE
Payer: COMMERCIAL

## 2023-01-31 VITALS
HEIGHT: 65 IN | SYSTOLIC BLOOD PRESSURE: 180 MMHG | TEMPERATURE: 97.9 F | DIASTOLIC BLOOD PRESSURE: 101 MMHG | WEIGHT: 198 LBS | OXYGEN SATURATION: 98 % | HEART RATE: 92 BPM | BODY MASS INDEX: 32.99 KG/M2 | RESPIRATION RATE: 16 BRPM

## 2023-01-31 DIAGNOSIS — S61.012A LACERATION OF LEFT THUMB WITHOUT FOREIGN BODY WITHOUT DAMAGE TO NAIL, INITIAL ENCOUNTER: Primary | ICD-10-CM

## 2023-01-31 DIAGNOSIS — I10 HYPERTENSION, UNSPECIFIED TYPE: ICD-10-CM

## 2023-01-31 PROCEDURE — 90471 IMMUNIZATION ADMIN: CPT | Performed by: PHYSICIAN ASSISTANT

## 2023-01-31 PROCEDURE — 90715 TDAP VACCINE 7 YRS/> IM: CPT | Performed by: PHYSICIAN ASSISTANT

## 2023-01-31 PROCEDURE — 6360000002 HC RX W HCPCS: Performed by: PHYSICIAN ASSISTANT

## 2023-01-31 PROCEDURE — 12001 RPR S/N/AX/GEN/TRNK 2.5CM/<: CPT

## 2023-01-31 PROCEDURE — 99284 EMERGENCY DEPT VISIT MOD MDM: CPT

## 2023-01-31 RX ADMIN — TETANUS TOXOID, REDUCED DIPHTHERIA TOXOID AND ACELLULAR PERTUSSIS VACCINE, ADSORBED 0.5 ML: 5; 2.5; 8; 8; 2.5 SUSPENSION INTRAMUSCULAR at 19:43

## 2023-01-31 ASSESSMENT — ENCOUNTER SYMPTOMS
COUGH: 0
WHEEZING: 0
CHEST TIGHTNESS: 0
BACK PAIN: 0
ABDOMINAL DISTENTION: 0
SORE THROAT: 0
DIARRHEA: 0
COLOR CHANGE: 0
SHORTNESS OF BREATH: 0
EYE ITCHING: 0
RHINORRHEA: 0
EYE PAIN: 0
EYE REDNESS: 0
SINUS PRESSURE: 0
NAUSEA: 0
ABDOMINAL PAIN: 0
VOMITING: 0

## 2023-01-31 ASSESSMENT — PAIN DESCRIPTION - DESCRIPTORS: DESCRIPTORS: DISCOMFORT

## 2023-01-31 ASSESSMENT — PAIN DESCRIPTION - LOCATION: LOCATION: FINGER (COMMENT WHICH ONE)

## 2023-01-31 ASSESSMENT — PAIN DESCRIPTION - PAIN TYPE: TYPE: ACUTE PAIN

## 2023-01-31 ASSESSMENT — PAIN DESCRIPTION - ORIENTATION: ORIENTATION: LEFT

## 2023-01-31 ASSESSMENT — PAIN - FUNCTIONAL ASSESSMENT: PAIN_FUNCTIONAL_ASSESSMENT: 0-10

## 2023-01-31 ASSESSMENT — PAIN SCALES - GENERAL: PAINLEVEL_OUTOF10: 4

## 2023-02-01 NOTE — DISCHARGE INSTRUCTIONS
Thank you for choosing [facility] for your emergency care today. We take a lot of pride in the fact that you chose us for your care and we strived to do everything necessary to provide you with excellent medical care. We hope you agree that you received excellent care today. To continue that excellent medical care, the following information very important that you read and understand before you leave the emergency department today. It contains information about:  Your diagnosis  What was done for you in the emergency department  What you can expect from your illness or injury moving forward  The steps you will need to take after leaving the emergency department to help achieve the best possible outcome for your illness or injury. What we did in the Emergency Department Today:     You were seen in the Emergency Department today by Gunner James PA-C. You had the following lab abnormalities:  Labs Reviewed - No data to display    If no result appears for the test, then it was within normal limits. If the test is pending, the hospital will cynthia you if the results are abnormal as soon as the test is completed. You had the following diagnostic imaging:  No orders to display       You were given the following medications during your stay in the Emergency Department:  Orders Placed This Encounter   Medications    Tetanus-Diphth-Acell Pertussis (BOOSTRIX) injection 0.5 mL       You were diagnosed with the followin. Laceration of left thumb without foreign body without damage to nail, initial encounter      IMPORTANT: If your condition worsens, or your development symptoms that you find concerning and want to have checked out immediately, do not hesitate to return to the Emergency Department. You can call --1 and have trained Emergency Medical Service providers bring you to the emergency department if you can't do so safely and quickly.  They can begin the medical evaluation, on scene, wherever you are located and can begin critical medical care on the way to the emergency. Signs and symptoms that should always cause concern and be evaluated urgently or in the Emergency Department:  Going Unconscious  Dizziness, lightheadedness like you are going to faint, confusion, loss of balance or new clumsiness  New Seizures  Significant Head Injury  Eye Injuries or new vision changes  Severe Nausea and Vomiting that prevents you from eating, drinking and/or taking your medications  Significant or persistent fevers (Above 100.3 F in babies, over 80 F in adults, as an example)  Chest Pain  Shortness of Breath  Sudden, severe pain  Cuts that won't stop bleeding  Significant Ramos  Bleeding during Pregnancy in women  Testicular Pain in men    Your Plan of Care after leaving our Emergency Department     You should read the following instructions before leaving the Emergency Department. If you have any questions about this Plan of Care, please don't hesitate to ask. It is important that you understand this Plan of Care so that you can achieve the best possible outcome from your illness or injury. IMPORTANT INSTRUCTIONS:  1. Keep this bandage on for 24 hours  2. Then begin washing twice daily with soap and water  3. Cover with antibiotic ointment and a bandage, if bandage becomes wet or dirty change it  4. Return sooner for any redness around the suture site, swelling, pus or fever.   5. May take Tylenol or Motrin over-the-counter as needed for pain    You should follow-up with:  Marita Weber MD  [address] [place] [address] [facility]  [phone]    Schedule an appointment as soon as possible for a visit   As needed    [facility] Emergency Department  [address] [place] [address]  Go to   If symptoms worsen      You should call the number of the providers listed above to schedule follow-up appointments in the timeline recommended or to speak to a healthcare provider. These providers also have on-call clinicians available after hours and on weekends for urgent questions and can be reached by calling the same number. If you feel your issue is an emergency, please don't hesitate to return to the emergency department for evaluation. If you can not safely and quickly get yourself to the emergency department, call 9-1-1 and have trained Emergency Medical Service providers bring you to the emergency department. They can begin the medical evaluation, on scene, wherever you are located and can begin critical medical care on the way to the emergency department while in the ambulance. Even if not listed above, you should always call your Primary Care Provider after a visit to the Emergency Department. They will want to know what your emergency was so they can best figure out how to continue to coordinate your care moving forward. Your Primary Care Provider is responsible for coordinating and tracking your health and medical care. You should keep them informed regularly of any and all new medical conditions, changes to your medications or other information pertinent to your health. DISCHARGE MEDICATIONS:  The following medications were prescribed for the you during this visit.  Take them as directed below:     Current Discharge Medication List          These home medications were modified or discontinued during this visit (be sure you discuss these modifications with your primary care or prescribing physician as soon as possible):   Current Discharge Medication List        Current Discharge Medication List           You should continue to take the following home medications as prescribed by your physician:   Current Discharge Medication List        CONTINUE these medications which have NOT CHANGED    Details   amLODIPine (NORVASC) 5 MG tablet Take 1 tablet by mouth at bedtime  Qty: 90 tablet, Refills: 3    Associated Diagnoses: Essential hypertension      sertraline (ZOLOFT) 100 MG tablet TAKE 1 TABLET BY MOUTH EVERY DAY  Qty: 90 tablet, Refills: 0    Associated Diagnoses: Major depressive disorder, recurrent, moderate (HCC); DAKOTA (generalized anxiety disorder)      triamterene-hydroCHLOROthiazide (DYAZIDE) 37.5-25 MG per capsule TAKE 1 CAPSULE BY MOUTH EVERY DAY  Qty: 90 capsule, Refills: 0    Associated Diagnoses: Essential hypertension      vitamin D3 (CHOLECALCIFEROL) 25 MCG (1000 UT) TABS tablet TAKE 1 TABLET BY MOUTH EVERY DAY  Qty: 90 tablet, Refills: 0      meloxicam (MOBIC) 15 MG tablet TAKE 1 TABLET BY MOUTH EVERY DAY  Qty: 90 tablet, Refills: 0    Associated Diagnoses: Acute pain of right shoulder      norgestimate-ethinyl estradiol (ORTHO-CYCLEN) 0.25-35 MG-MCG per tablet TAKE 1 TABLET BY MOUTH EVERY DAY  Qty: 84 tablet, Refills: 3    Associated Diagnoses: Uses birth control      traZODone (DESYREL) 50 MG tablet Take 1-2 tablets 20-30 minutes before bed nightly as needed  Qty: 90 tablet, Refills: 1    Associated Diagnoses: Adjustment insomnia      SUMAtriptan (IMITREX) 50 MG tablet Take 1 tablet by mouth once as needed for Migraine  Qty: 27 tablet, Refills: 1    Associated Diagnoses: Migraine with aura and without status migrainosus, not intractable      clobetasol (TEMOVATE) 0.05 % cream Apply topically 2 times daily. Qty: 30 g, Refills: 1    Associated Diagnoses: Dermatitis      albuterol sulfate  (90 Base) MCG/ACT inhaler INHALE 2 PUFFS BY MOUTH EVERY SIX HOURS AS NEEDED FOR WHEEZING  Qty: 8.5 g, Refills: 0             Additional important information has been included within this packet, please make sure that you have read this information as it will better help you understand your illness/injury better, the danger signs to watch for and things you can do to improve your condition and health in the future.

## 2023-02-01 NOTE — ED NOTES
Discharge instructions provided to patient by RN at bedside. No further concerns addressed at this time.      Stas France RN  01/31/23 1949

## 2023-02-01 NOTE — ED PROVIDER NOTES
[facility] ENCOUNTER          ADVANCED PRACTICE PROVIDER NOTE       Date of evaluation: 1/31/2023    Chief Complaint (from nursing documentation)     Laceration (Left thumb from knife at home)      History of Present Illness     Rodrigo Ramey is a 30 y.o. female who presents to the emergency department with a complaint of left thumb laceration. Patient was slicing vegetables in preparation for dinner, when the knife slipped, causing a laceration to the tip of the left thumb. The patient reports that the bleeding was somewhat continuous for a bit, so she was a bit concerned that she may need stitches prompting visit to the emergency department. No numbness or tingling in the finger distally. Pain is rated as a 3 out of 10, worse with palpation. Her tetanus is not up-to-date. ASSESSMENT / PLAN  ([facility])     Rodrigo Ramey is admitted to the Emergency Department for evaluation of her chief complaint as described in the history of present illness. Complete history and physical was performed by me and my attending. Nursing notes, past medical history, surgical history, family history and social history were reviewed and addressed in the HPI. Maureen Aguilar is a 30 y.o. female who presents to the emergency department with a complaint of a laceration of the left thumb tip. Wound closure was performed and a dressing was applied as noted above. The patient should return for any signs of infection during that time. Wound care and laceration care instructions were given. Antibiotics were not prescribed. The patient's tetanus immunization  was  updated in the emergency department. In addition, the patient's blood pressure was elevated to 180/101 at presentation to the emergency department. She does have an extensive history of hypertension, and is not currently experiencing any symptoms concerning for hypertensive urgency or emergency.   The patient is due to take her evening amlodipine, and is notably in some mild pain. I do not feel that the patient warrants further evaluation for hypertensive crisis at this time. I discussed this plan at length the patient who verbalizes understanding and is in agreement. The patient is currently stable and will be discharged home for continued self-care. Please see patient's AVS for additional discharge instructions. The patient was seen initially by the physician assistant student on rotation. Although their history and physical examination of the patient was discussed, I personally performed a history and physical examination of the patient. The student made recommendations based on their findings for the medical decision making process for this patient, however all treatment/management plan processes were based on my verification of the information, examination and medical decision making. Medical Decision Making  Problems Addressed:  Hypertension, unspecified type: chronic illness or injury  Laceration of left thumb without foreign body without damage to nail, initial encounter: self-limited or minor problem    Risk  Prescription drug management. Minor surgery with no identified risk factors. Procedures     Laceration Repair Procedure Note    Indication: Laceration of the left thumb tip    Procedure: The patient was placed in the appropriate position and the area was then cleansed using chlorhexidine, irrigated with high pressure Shur-Clens and normal saline solution, and draped in a sterile fashion. The laceration was closed with Dermabond and steri strips. There were no additional lacerations requiring repair. The wound area was then dressed with a sterile dressing. Total repaired wound length: 1.5 cm. Suture count: 0    Other Items: None    The patient tolerated the procedure well.     Complications: None      ED Course     Nursing Notes, Past Medical Hx,Past Surgical Hx, Social Hx, Allergies, and Family Hx were reviewed.         The patient was given the following medications:  Orders Placed This Encounter   Medications    Tetanus-Diphth-Acell Pertussis (BOOSTRIX) injection 0.5 mL       CONSULTS:  None    Clinical Impression     1. Laceration of left thumb without foreign body without damage to nail, initial encounter        Disposition     PATIENT REFERRED TO:  Mariia Jesus MD  [address]   [address]  [facility] [address]  [phone]    Schedule an appointment as soon as possible for a visit   As needed    [facility]  [address] [place] [address] [id number] [phone]  Go to   If symptoms worsen    DISCHARGE MEDICATIONS:  Current Discharge Medication List          DISPOSITION Discharge - Pending Orders Complete 01/31/2023 07:28:14 PM      Diagnostic Results and Other Data     RADIOLOGY:  No orders to display       LABS:   No results found for this visit on 01/31/23.    RECENT VITALS:  BP: (!) 180/101, Temp: 97.9 °F (36.6 °C), Heart Rate: 92, Resp: 16, SpO2: 98 %     Physical Exam     INITIAL VITALS: BP: (!) 180/101, Temp: 97.9 °F (36.6 °C), Heart Rate: (!) 104, Resp: 16, SpO2: 98 %    Physical Exam  Constitutional:       General: She is not in acute distress.     Appearance: She is well-developed.   HENT:      Head: Normocephalic and atraumatic.   Eyes:      General: No scleral icterus.     Conjunctiva/sclera: Conjunctivae normal.   Neck:      Vascular: No JVD.   Cardiovascular:      Rate and Rhythm: Normal rate and regular rhythm.      Heart sounds: Normal heart sounds.   Pulmonary:      Effort: Pulmonary effort is normal. No respiratory distress.      Breath sounds: Normal breath sounds. No wheezing or rales.   Chest:      Chest wall: No tenderness.   Abdominal:      General: There is no distension.      Palpations: Abdomen is soft.      Tenderness: There is no abdominal tenderness.   Musculoskeletal:         General: No tenderness. Normal range of  motion. Cervical back: Normal range of motion and neck supple. Skin:     General: Skin is warm and dry. Coloration: Skin is not pale. Findings: Laceration present. No rash. Comments: 1.5 cm laceration of the tip of the left thumb with distal edge of the nail injury, minor. Hemostatic   Neurological:      Mental Status: She is alert and oriented to person, place, and time. Psychiatric:         Behavior: Behavior normal.       Review of Systems     Review of Systems   Constitutional:  Negative for chills, diaphoresis, fever and unexpected weight change. HENT:  Negative for congestion, drooling, mouth sores, postnasal drip, rhinorrhea, sinus pressure and sore throat. Eyes:  Negative for pain, redness and itching. Respiratory:  Negative for cough, chest tightness, shortness of breath and wheezing. Cardiovascular:  Negative for chest pain, palpitations and leg swelling. Gastrointestinal:  Negative for abdominal distention, abdominal pain, diarrhea, nausea and vomiting. Genitourinary:  Negative for dysuria and hematuria. Musculoskeletal:  Negative for arthralgias, back pain, gait problem, myalgias, neck pain and neck stiffness. Skin:  Positive for wound. Negative for color change, pallor and rash. Neurological:  Negative for dizziness, syncope, weakness, light-headedness, numbness and headaches. Hematological:  Does not bruise/bleed easily. Psychiatric/Behavioral:  Negative for agitation, hallucinations, self-injury, sleep disturbance and suicidal ideas. The patient is not nervous/anxious. All other systems reviewed and are negative. Past Medical, Surgical, Family, and Social History     She has a past medical history of Asthma, Chicken pox, Degenerative disc disease, lumbar, Guillain Barré syndrome ([address] Hyperlipidemia, Kidney disease, Meniere's disease, Seizure ([address] Trimalleolar fracture of ankle, closed, right, initial encounter, and Viral hepatitis.   She has a past surgical history that includes Tonsillectomy and adenoidectomy (1994); [place] tooth extraction; Ankle fracture surgery (Right, 03/02/2018); and Cholecystectomy, laparoscopic (N/A, 12/13/2022). Her family history includes Arthritis in her mother; Cancer in her paternal grandfather; Depression in her mother and sister; Diabetes in her paternal grandfather and paternal grandmother; Heart Defect in her brother; High Cholesterol in her father, paternal grandfather, and paternal grandmother; Mult Sclerosis in her paternal aunt; Schizophrenia in her sister. She reports that she has never smoked. She has never used smokeless tobacco. She reports current alcohol use. She reports current drug use.     Medications     Current Discharge Medication List        CONTINUE these medications which have NOT CHANGED    Details   amLODIPine (NORVASC) 5 MG tablet Take 1 tablet by mouth at bedtime  Qty: 90 tablet, Refills: 3    Associated Diagnoses: Essential hypertension      sertraline (ZOLOFT) 100 MG tablet TAKE 1 TABLET BY MOUTH EVERY DAY  Qty: 90 tablet, Refills: 0    Associated Diagnoses: Major depressive disorder, recurrent, moderate (HCC); DAKOTA (generalized anxiety disorder)      triamterene-hydroCHLOROthiazide (DYAZIDE) 37.5-25 MG per capsule TAKE 1 CAPSULE BY MOUTH EVERY DAY  Qty: 90 capsule, Refills: 0    Associated Diagnoses: Essential hypertension      vitamin D3 (CHOLECALCIFEROL) 25 MCG (1000 UT) TABS tablet TAKE 1 TABLET BY MOUTH EVERY DAY  Qty: 90 tablet, Refills: 0      meloxicam (MOBIC) 15 MG tablet TAKE 1 TABLET BY MOUTH EVERY DAY  Qty: 90 tablet, Refills: 0    Associated Diagnoses: Acute pain of right shoulder      norgestimate-ethinyl estradiol (ORTHO-CYCLEN) 0.25-35 MG-MCG per tablet TAKE 1 TABLET BY MOUTH EVERY DAY  Qty: 84 tablet, Refills: 3    Associated Diagnoses: Uses birth control      traZODone (DESYREL) 50 MG tablet Take 1-2 tablets 20-30 minutes before bed nightly as needed  Qty: 90 tablet, Refills: 1 Associated Diagnoses: Adjustment insomnia      SUMAtriptan (IMITREX) 50 MG tablet Take 1 tablet by mouth once as needed for Migraine  Qty: 27 tablet, Refills: 1    Associated Diagnoses: Migraine with aura and without status migrainosus, not intractable      clobetasol (TEMOVATE) 0.05 % cream Apply topically 2 times daily. Qty: 30 g, Refills: 1    Associated Diagnoses: Dermatitis      albuterol sulfate  (90 Base) MCG/ACT inhaler INHALE 2 PUFFS BY MOUTH EVERY SIX HOURS AS NEEDED FOR WHEEZING  Qty: 8.5 g, Refills: 0             Allergies     She is allergic to cortisone, lamictal [lamotrigine], onion, oxcarbazepine, penicillins, keppra [levetiracetam], promethazine, sulfa antibiotics, adhesive tape, azithromycin, and gabapentin.          [address] [place], [place]  01/31/23 [address], [address]  01/31/23 [id number]

## 2023-02-04 DIAGNOSIS — M25.511 ACUTE PAIN OF RIGHT SHOULDER: ICD-10-CM

## 2023-02-06 RX ORDER — MELOXICAM 15 MG/1
15 TABLET ORAL NIGHTLY
Qty: 30 TABLET | Refills: 3 | Status: SHIPPED | OUTPATIENT
Start: 2023-02-06

## 2023-02-06 NOTE — TELEPHONE ENCOUNTER
Medication:   Requested Prescriptions     Pending Prescriptions Disp Refills    meloxicam (MOBIC) 15 MG tablet [Pharmacy Med Name: Meloxicam Oral Tablet 15 MG] 90 tablet 0     Sig: TAKE 1 TABLET BY MOUTH EVERY DAY        Last Filled:  09/21/22    Patient Phone Number: [phone] (home)     Last appt: 4/19/2022   Return in about 3 months (around 7/19/2022). Next appt: Visit date not found    Last OARRS:   RX Monitoring 10/9/2017   Attestation The Prescription Monitoring Report for this patient was reviewed today. Periodic Controlled Substance Monitoring No signs of potential drug abuse or diversion identified.

## 2023-02-09 ENCOUNTER — OFFICE VISIT (OUTPATIENT)
Dept: [facility] CLINIC | Age: 38
End: 2023-02-09
Payer: COMMERCIAL

## 2023-02-09 VITALS
BODY MASS INDEX: 33.35 KG/M2 | WEIGHT: 200.4 LBS | HEART RATE: 76 BPM | SYSTOLIC BLOOD PRESSURE: 152 MMHG | DIASTOLIC BLOOD PRESSURE: 76 MMHG

## 2023-02-09 DIAGNOSIS — S61.112A LACERATION OF LEFT THUMB WITHOUT FOREIGN BODY WITH DAMAGE TO NAIL, INITIAL ENCOUNTER: ICD-10-CM

## 2023-02-09 DIAGNOSIS — I10 ESSENTIAL HYPERTENSION: Primary | ICD-10-CM

## 2023-02-09 PROCEDURE — G8427 DOCREV CUR MEDS BY ELIG CLIN: HCPCS | Performed by: FAMILY MEDICINE

## 2023-02-09 PROCEDURE — G8417 CALC BMI ABV UP PARAM F/U: HCPCS | Performed by: FAMILY MEDICINE

## 2023-02-09 PROCEDURE — 3078F DIAST BP <80 MM HG: CPT | Performed by: FAMILY MEDICINE

## 2023-02-09 PROCEDURE — G8484 FLU IMMUNIZE NO ADMIN: HCPCS | Performed by: FAMILY MEDICINE

## 2023-02-09 PROCEDURE — 1036F TOBACCO NON-USER: CPT | Performed by: FAMILY MEDICINE

## 2023-02-09 PROCEDURE — 99214 OFFICE O/P EST MOD 30 MIN: CPT | Performed by: FAMILY MEDICINE

## 2023-02-09 PROCEDURE — 3077F SYST BP >= 140 MM HG: CPT | Performed by: FAMILY MEDICINE

## 2023-02-09 RX ORDER — METOPROLOL SUCCINATE 25 MG/1
25 TABLET, EXTENDED RELEASE ORAL DAILY
Qty: 30 TABLET | Refills: 3 | Status: SHIPPED | OUTPATIENT
Start: 2023-02-09

## 2023-02-09 ASSESSMENT — PATIENT HEALTH QUESTIONNAIRE - PHQ9
SUM OF ALL RESPONSES TO PHQ QUESTIONS 1-9: 9
1. LITTLE INTEREST OR PLEASURE IN DOING THINGS: 2
8. MOVING OR SPEAKING SO SLOWLY THAT OTHER PEOPLE COULD HAVE NOTICED. OR THE OPPOSITE, BEING SO FIGETY OR RESTLESS THAT YOU HAVE BEEN MOVING AROUND A LOT MORE THAN USUAL: 0
7. TROUBLE CONCENTRATING ON THINGS, SUCH AS READING THE NEWSPAPER OR WATCHING TELEVISION: 1
SUM OF ALL RESPONSES TO PHQ9 QUESTIONS 1 & 2: 4
SUM OF ALL RESPONSES TO PHQ QUESTIONS 1-9: 9
2. FEELING DOWN, DEPRESSED OR HOPELESS: 2
5. POOR APPETITE OR OVEREATING: 1
SUM OF ALL RESPONSES TO PHQ QUESTIONS 1-9: 9
3. TROUBLE FALLING OR STAYING ASLEEP: 1
9. THOUGHTS THAT YOU WOULD BE BETTER OFF DEAD, OR OF HURTING YOURSELF: 0
SUM OF ALL RESPONSES TO PHQ QUESTIONS 1-9: 9
6. FEELING BAD ABOUT YOURSELF - OR THAT YOU ARE A FAILURE OR HAVE LET YOURSELF OR YOUR FAMILY DOWN: 1
4. FEELING TIRED OR HAVING LITTLE ENERGY: 1

## 2023-02-09 ASSESSMENT — ANXIETY QUESTIONNAIRES
GAD7 TOTAL SCORE: 7
4. TROUBLE RELAXING: 1
7. FEELING AFRAID AS IF SOMETHING AWFUL MIGHT HAPPEN: 0
2. NOT BEING ABLE TO STOP OR CONTROL WORRYING: 1
5. BEING SO RESTLESS THAT IT IS HARD TO SIT STILL: 0
1. FEELING NERVOUS, ANXIOUS, OR ON EDGE: 2
6. BECOMING EASILY ANNOYED OR IRRITABLE: 2
3. WORRYING TOO MUCH ABOUT DIFFERENT THINGS: 1

## 2023-02-09 NOTE — PROGRESS NOTES
Chief Complaint   Patient presents with    Follow-Up from Hospital       HPI: Rajendra Rosario  presents for evaluation and management of Laceration and blood pressure. 10 days ago Lex Purdy cut her left thumb chopping vegetables. She went to the emergency room and they apparently put Dermabond on it and then Steri-Strips. She notes that it has been having a little bit due to discharge and is not healing well. It is still painful to touch and she has been putting Steri-Strips on it herself. She tells me that she has been taking and tolerating her blood pressure meds but she has gained a little bit of weight as well.     Today's PHQ:    PHQ Scores 2/9/2023 4/19/2022 3/22/2022 3/3/2021 2/3/2021 6/30/2020 4/4/2017   PHQ2 Score 4 0 0 0 4 0 0   PHQ9 Score 9 4 0 0 16 0 4     Interpretation of Total Score Depression Severity: 1-4 = Minimal depression, 5-9 = Mild depression, 10-14 = Moderate depression, 15-19 = Moderately severe depression, 20-27 = Severe depression        Review of Systems    Allergies   Allergen Reactions    Cortisone Anaphylaxis     fulll blackout for 5 minutes, w/ smelling salts to come to consciousness    Lamictal [Lamotrigine] Rash    Onion Anaphylaxis and Other (See Comments)     Red onion    Oxcarbazepine Rash    Penicillins Anaphylaxis    Keppra [Levetiracetam] Seizure    Promethazine Seizure    Sulfa Antibiotics Rash    Adhesive Tape Rash    Azithromycin Diarrhea    Gabapentin Anxiety, Other (See Comments) and Hallucinations     Suicidal thoughts     New Prescriptions    METOPROLOL SUCCINATE (TOPROL XL) 25 MG EXTENDED RELEASE TABLET    Take 1 tablet by mouth daily     Current Outpatient Medications   Medication Sig Dispense Refill    metoprolol succinate (TOPROL XL) 25 MG extended release tablet Take 1 tablet by mouth daily 30 tablet 3    meloxicam (MOBIC) 15 MG tablet Take 1 tablet by mouth at bedtime 30 tablet 3    amLODIPine (NORVASC) 5 MG tablet Take 1 tablet by mouth at bedtime 90 tablet 3 sertraline (ZOLOFT) 100 MG tablet TAKE 1 TABLET BY MOUTH EVERY DAY 90 tablet 0    triamterene-hydroCHLOROthiazide (DYAZIDE) 37.5-25 MG per capsule TAKE 1 CAPSULE BY MOUTH EVERY DAY 90 capsule 0    vitamin D3 (CHOLECALCIFEROL) 25 MCG (1000 UT) TABS tablet TAKE 1 TABLET BY MOUTH EVERY DAY (Patient taking differently: Take 1,000 Units by mouth at bedtime) 90 tablet 0    norgestimate-ethinyl estradiol (ORTHO-CYCLEN) 0.25-35 MG-MCG per tablet TAKE 1 TABLET BY MOUTH EVERY DAY 84 tablet 3    traZODone (DESYREL) 50 MG tablet Take 1-2 tablets 20-30 minutes before bed nightly as needed 90 tablet 1    clobetasol (TEMOVATE) 0.05 % cream Apply topically 2 times daily. (Patient taking differently: 2 times daily as needed Apply topically 2 times daily.) 30 g 1    albuterol sulfate  (90 Base) MCG/ACT inhaler INHALE 2 PUFFS BY MOUTH EVERY SIX HOURS AS NEEDED FOR WHEEZING 8.5 g 0    SUMAtriptan (IMITREX) 50 MG tablet Take 1 tablet by mouth once as needed for Migraine 27 tablet 1     No current facility-administered medications for this visit. Past Medical History:   Diagnosis Date    Asthma     Chicken pox     Degenerative disc disease, lumbar     Guillain Barré syndrome ([facility] [address] 8/4/2016    Hyperlipidemia     Kidney disease     Meniere's disease     Seizure ([facility] [address]     none since 2013    Trimalleolar fracture of ankle, closed, right, initial encounter 2/27/2018    Viral hepatitis          Objective   BP (!) 152/76   Pulse 76   Wt 200 lb 6.4 oz (90.9 kg)   LMP 02/09/2023   BMI 33.35 kg/m²   Wt Readings from Last 3 Encounters:   02/09/23 200 lb 6.4 oz (90.9 kg)   01/31/23 198 lb (89.8 kg)   12/28/22 196 lb 3.2 oz (89 kg)       Physical Exam  Skin:     Comments: 1 cm skin flap on distal tip of left thumb. Removed old dressing and applied a new 1. Counseled patient on wound care.   Recheck 1 month         Chemistry        Component Value Date/Time     12/14/2022 0508    K 4.2 12/14/2022 0508    K 3.6 12/12/2022 1532     12/14/2022 0508    CO2 23 12/14/2022 0508    BUN 7 12/14/2022 0508    CREATININE 0.5 (L) 12/14/2022 0508        Component Value Date/Time    CALCIUM 8.5 12/14/2022 0508    ALKPHOS 66 12/14/2022 0508    AST 22 12/14/2022 0508    ALT 17 12/14/2022 0508    BILITOT <0.2 12/14/2022 0508          Lab Results   Component Value Date    WBC 6.1 12/14/2022    HGB 14.2 12/14/2022    HCT 41.0 12/14/2022    MCV 85.3 12/14/2022     12/14/2022     Lab Results   Component Value Date    LABA1C 5.5 03/22/2022     Lab Results   Component Value Date    .2 03/22/2022     Lab Results   Component Value Date    LABA1C 5.5 03/22/2022     No components found for: CHLPL  Lab Results   Component Value Date    TRIG 292 (H) 07/01/2020    TRIG 178 (H) 10/31/2013    TRIG 154 (H) 03/30/2013     Lab Results   Component Value Date    HDL 42 07/01/2020    HDL 56 10/31/2013    HDL 59 03/30/2013     Lab Results   Component Value Date    LDLCALC 104 (H) 07/01/2020    LDLCALC 136 (H) 10/31/2013    LDLCALC 125 (H) 03/30/2013     Lab Results   Component Value Date    LABVLDL 58 07/01/2020    LABVLDL 36 10/31/2013    LABVLDL 31 03/30/2013         Assessment   Plan   1. Essential hypertension  Uncontrolled: We will add a low-dose metoprolol and follow-up in 1 month.  Plan to discuss weight management strategies at that visit as well  - metoprolol succinate (TOPROL XL) 25 MG extended release tablet; Take 1 tablet by mouth daily  Dispense: 30 tablet; Refill: 3    2. Laceration of left thumb without foreign body with damage to nail, initial encounter  Replace dressing as described above.  Counseled patient on wound care.  Does not appear infected but should it develop infection she will need antibiotic therapy        RTC Return in about 1 month (around 3/9/2023).

## 2023-02-15 ENCOUNTER — TELEMEDICINE (OUTPATIENT)
Dept: [facility] CLINIC | Age: 38
End: 2023-02-15
Payer: COMMERCIAL

## 2023-02-15 DIAGNOSIS — L30.9 ECZEMA, UNSPECIFIED TYPE: ICD-10-CM

## 2023-02-15 DIAGNOSIS — J01.10 ACUTE NON-RECURRENT FRONTAL SINUSITIS: Primary | ICD-10-CM

## 2023-02-15 PROCEDURE — 1036F TOBACCO NON-USER: CPT | Performed by: FAMILY MEDICINE

## 2023-02-15 PROCEDURE — G8417 CALC BMI ABV UP PARAM F/U: HCPCS | Performed by: FAMILY MEDICINE

## 2023-02-15 PROCEDURE — G8427 DOCREV CUR MEDS BY ELIG CLIN: HCPCS | Performed by: FAMILY MEDICINE

## 2023-02-15 PROCEDURE — G8484 FLU IMMUNIZE NO ADMIN: HCPCS | Performed by: FAMILY MEDICINE

## 2023-02-15 PROCEDURE — 99214 OFFICE O/P EST MOD 30 MIN: CPT | Performed by: FAMILY MEDICINE

## 2023-02-15 RX ORDER — CLINDAMYCIN HYDROCHLORIDE 300 MG/1
300 CAPSULE ORAL 2 TIMES DAILY
Qty: 14 CAPSULE | Refills: 0 | Status: SHIPPED | OUTPATIENT
Start: 2023-02-15 | End: 2023-02-22

## 2023-02-15 SDOH — ECONOMIC STABILITY: INCOME INSECURITY: HOW HARD IS IT FOR YOU TO PAY FOR THE VERY BASICS LIKE FOOD, HOUSING, MEDICAL CARE, AND HEATING?: NOT VERY HARD

## 2023-02-15 SDOH — ECONOMIC STABILITY: FOOD INSECURITY: WITHIN THE PAST 12 MONTHS, THE FOOD YOU BOUGHT JUST DIDN'T LAST AND YOU DIDN'T HAVE MONEY TO GET MORE.: NEVER TRUE

## 2023-02-15 SDOH — ECONOMIC STABILITY: FOOD INSECURITY: WITHIN THE PAST 12 MONTHS, YOU WORRIED THAT YOUR FOOD WOULD RUN OUT BEFORE YOU GOT MONEY TO BUY MORE.: NEVER TRUE

## 2023-02-15 SDOH — ECONOMIC STABILITY: HOUSING INSECURITY
IN THE LAST 12 MONTHS, WAS THERE A TIME WHEN YOU DID NOT HAVE A STEADY PLACE TO SLEEP OR SLEPT IN A SHELTER (INCLUDING NOW)?: NO

## 2023-02-15 SDOH — ECONOMIC STABILITY: TRANSPORTATION INSECURITY
IN THE PAST 12 MONTHS, HAS LACK OF TRANSPORTATION KEPT YOU FROM MEETINGS, WORK, OR FROM GETTING THINGS NEEDED FOR DAILY LIVING?: NO

## 2023-02-15 ASSESSMENT — ENCOUNTER SYMPTOMS
SINUS PRESSURE: 1
COUGH: 1
DIARRHEA: 0
ABDOMINAL PAIN: 0
EYE PAIN: 0
SHORTNESS OF BREATH: 0
SINUS PAIN: 1
CONSTIPATION: 0

## 2023-02-15 NOTE — PROGRESS NOTES
2/15/2023    TELEHEALTH EVALUATION -- Audio/Visual (During WSEL-68 public health emergency)    HPI:    Oswald Hughes (:  1985) has requested an audio/video evaluation for the following concern(s):    For the last 2 weeks she has had symptoms of headache, sinus congestion, ear fullness, sore throat, pnd, thick yellow mucous. No fevers. It got better but in the last 2 days, worsened. Negative covid. No muscle aches. Elbow eczema  Uses topical creams but no improvement  Would like to have derm eval.     Review of Systems   Constitutional:  Positive for fatigue. Negative for appetite change, chills and fever. HENT:  Positive for congestion, postnasal drip, sinus pressure and sinus pain. Eyes:  Negative for pain and visual disturbance. Respiratory:  Positive for cough. Negative for shortness of breath. Cardiovascular:  Negative for chest pain and palpitations. Gastrointestinal:  Negative for abdominal pain, constipation and diarrhea. Genitourinary:  Negative for difficulty urinating. Musculoskeletal:  Negative for arthralgias. Skin:  Negative for rash and wound. Neurological:  Positive for headaches. Negative for dizziness, weakness and light-headedness. Hematological:  Does not bruise/bleed easily. Psychiatric/Behavioral:  Negative for behavioral problems. Prior to Visit Medications    Medication Sig Taking?  Authorizing Provider   clindamycin (CLEOCIN) 300 MG capsule Take 1 capsule by mouth 2 times daily for 7 days Yes Can Blas MD   metoprolol succinate (TOPROL XL) 25 MG extended release tablet Take 1 tablet by mouth daily Yes Danyelle Fenton MD   meloxicam (MOBIC) 15 MG tablet Take 1 tablet by mouth at bedtime Yes Can Blas MD   amLODIPine (NORVASC) 5 MG tablet Take 1 tablet by mouth at bedtime Yes Can Blas MD   sertraline (ZOLOFT) 100 MG tablet TAKE 1 TABLET BY MOUTH EVERY DAY Yes Can Blas MD   triamterene-hydroCHLOROthiazide (DYAZIDE) 37.5-25 MG per capsule TAKE 1 CAPSULE BY MOUTH EVERY DAY Yes Pretty Ortiz MD   vitamin D3 (CHOLECALCIFEROL) 25 MCG (1000 UT) TABS tablet TAKE 1 TABLET BY MOUTH EVERY DAY  Patient taking differently: Take 1,000 Units by mouth at bedtime Yes Nik Jerry,    norgestimate-ethinyl estradiol (ORTHO-CYCLEN) 0.25-35 MG-MCG per tablet TAKE 1 TABLET BY MOUTH EVERY DAY Yes Suleman Ceron MD   traZODone (DESYREL) 50 MG tablet Take 1-2 tablets 20-30 minutes before bed nightly as needed Yes Pretty Ortiz MD   clobetasol (TEMOVATE) 0.05 % cream Apply topically 2 times daily. Patient taking differently: 2 times daily as needed Apply topically 2 times daily. Yes Pretty Ortiz MD   albuterol sulfate  (90 Base) MCG/ACT inhaler INHALE 2 PUFFS BY MOUTH EVERY SIX HOURS AS NEEDED FOR WHEEZING Yes Pretty Ortiz MD   SUMAtriptan (IMITREX) 50 MG tablet Take 1 tablet by mouth once as needed for Migraine  Pretty Ortiz MD       Social History     Tobacco Use    Smoking status: Never    Smokeless tobacco: Never   Vaping Use    Vaping Use: Never used   Substance Use Topics    Alcohol use: Yes     Alcohol/week: 0.0 standard drinks     Comment: rare celebratory    Drug use:  Yes        Allergies   Allergen Reactions    Cortisone Anaphylaxis     fulll blackout for 5 minutes, w/ smelling salts to come to consciousness    Lamictal [Lamotrigine] Rash    Onion Anaphylaxis and Other (See Comments)     Red onion    Oxcarbazepine Rash    Penicillins Anaphylaxis    Keppra [Levetiracetam] Seizure    Promethazine Seizure    Sulfa Antibiotics Rash    Adhesive Tape Rash    Azithromycin Diarrhea    Gabapentin Anxiety, Other (See Comments) and Hallucinations     Suicidal thoughts   ,   Past Medical History:   Diagnosis Date    Asthma     Chicken pox     Degenerative disc disease, lumbar     Guillain Barr[facility] syndrome ([facility] 8/4/2016    Hyperlipidemia     Kidney disease     Meniere's disease     Seizure ([facility] none since 2013    Trimalleolar fracture of ankle, closed, right, initial encounter 2/27/2018    Viral hepatitis    ,   Past Surgical History:   Procedure Laterality Date    ANKLE FRACTURE SURGERY Right 03/02/2018    orif trimalleolar -rods/pins    CHOLECYSTECTOMY, LAPAROSCOPIC N/A 12/13/2022    ROBOTIC CHOLECYSTECTOMY performed by Parish Mackenzie DO at [facility] EXTRACTION     ,   Social History     Tobacco Use    Smoking status: Never    Smokeless tobacco: Never   Vaping Use    Vaping Use: Never used   Substance Use Topics    Alcohol use:  Yes     Alcohol/week: 0.0 standard drinks     Comment: rare celebratory    Drug use: Yes   ,   Family History   Problem Relation Age of Onset    High Cholesterol Father     Diabetes Paternal Grandmother     High Cholesterol Paternal Grandmother     Diabetes Paternal Grandfather     High Cholesterol Paternal Grandfather     Cancer Paternal Grandfather         colon    Arthritis Mother     Depression Mother     Depression Sister     Schizophrenia Sister     Heart Defect Brother     Mult Sclerosis Paternal Aunt    ,   Immunization History   Administered Date(s) Administered    Influenza 09/30/2015    Meningococcal ACWY Vaccine 03/09/2009    Meningococcal MCV4P (Menactra) 03/09/2009    PPD Test 03/10/2009    Pneumococcal Polysaccharide ([id number]) 02/03/2021    Td, unspecified formulation 03/09/2009    Tdap (Boostrix, Adacel) 03/09/2009, 01/31/2023   ,   Health Maintenance   Topic Date Due    COVID-19 Vaccine (1) Never done    Varicella vaccine (1 of 2 - 2-dose childhood series) Never done    Flu vaccine (1) 08/01/2022    Depression Monitoring  02/09/2024    Diabetes screen  03/22/2025    Cervical cancer screen  03/07/2027    DTaP/Tdap/Td vaccine (2 - Td or Tdap) 01/31/2033    Pneumococcal 0-64 years Vaccine  Completed    Hepatitis C screen  Completed    HIV screen  Completed    Hepatitis A vaccine  Aged Out    Hib vaccine  Aged Out    Meningococcal (ACWY) vaccine  Aged Out       PHYSICAL EXAMINATION:  [ INSTRUCTIONS:  \"[x]\" Indicates a positive item  \"[]\" Indicates a negative item  -- DELETE ALL ITEMS NOT EXAMINED]  [x] Alert  [x] Oriented to person/place/time    [x] No apparent distress  [] Toxic appearing    [] Face flushed appearing [x] Sclera clear  [] Lips are cyanotic      [x] Breathing appears normal  [] Appears tachypneic      [] Rash on visible skin    [x] Cranial Nerves II-XII grossly intact    [] Motor grossly intact in visible upper extremities    [] Motor grossly intact in visible lower extremities    [x] Normal Mood  [] Anxious appearing    [] Depressed appearing  [] Confused appearing      Due to this being a TeleHealth encounter, evaluation of the following organ systems is limited: Vitals/Constitutional/EENT/Resp/CV/GI//MS/Neuro/Skin/Heme-Lymph-Imm. ASSESSMENT/PLAN:  1. Acute non-recurrent frontal sinusitis  Meets criteria for abx  Has allergies complicating treatment  Will sent clindamycin  Discussed prognosis and duration of symptoms and what to expect with treatment  Discussed return to clinic precautions  All questions answered  - clindamycin (CLEOCIN) 300 MG capsule; Take 1 capsule by mouth 2 times daily for 7 days  Dispense: 14 capsule; Refill: 0    2. Eczema, unspecified type  Referral placed per patient request  Continue topical meds for now  - Alysia Villegas MD, Dermatology, [facility]    No follow-ups on file. Future Appointments   Date Time Provider Abelardo Cardona   9/12/2023  3:00 PM Suleman Ceron MD [facility]       An  electronic signature was used to authenticate this note. --Pretty Ortiz MD on 2/15/2023 at 3:11 PM    {Coding Help -- Use CPT 39043-97184 with EM elements or Time rules for Office Visits. :    >20 minutes were spent on the digital evaluation and management of this patient.     Pursuant to the emergency declaration under the [address] and the National Emergencies Act, [facility] waiver authority and the [employer] and [employer] Act, this Virtual  Visit was conducted, with patient's consent, to reduce the patient's risk of exposure to COVID-19 and provide continuity of care for an established patient. Services were provided through a video synchronous discussion virtually to substitute for in-person clinic visit.

## 2023-02-20 ENCOUNTER — OFFICE VISIT (OUTPATIENT)
Dept: [facility] CLINIC | Age: 38
End: 2023-02-20
Payer: COMMERCIAL

## 2023-02-20 VITALS
BODY MASS INDEX: 33.36 KG/M2 | TEMPERATURE: 97 F | SYSTOLIC BLOOD PRESSURE: 124 MMHG | HEIGHT: 65 IN | WEIGHT: 200.2 LBS | HEART RATE: 90 BPM | DIASTOLIC BLOOD PRESSURE: 70 MMHG

## 2023-02-20 DIAGNOSIS — H66.90 ACUTE OTITIS MEDIA, UNSPECIFIED OTITIS MEDIA TYPE: ICD-10-CM

## 2023-02-20 DIAGNOSIS — J01.00 ACUTE NON-RECURRENT MAXILLARY SINUSITIS: Primary | ICD-10-CM

## 2023-02-20 DIAGNOSIS — H91.92 HEARING LOSS OF LEFT EAR, UNSPECIFIED HEARING LOSS TYPE: ICD-10-CM

## 2023-02-20 PROCEDURE — G8484 FLU IMMUNIZE NO ADMIN: HCPCS | Performed by: FAMILY MEDICINE

## 2023-02-20 PROCEDURE — 1036F TOBACCO NON-USER: CPT | Performed by: FAMILY MEDICINE

## 2023-02-20 PROCEDURE — G8417 CALC BMI ABV UP PARAM F/U: HCPCS | Performed by: FAMILY MEDICINE

## 2023-02-20 PROCEDURE — G8427 DOCREV CUR MEDS BY ELIG CLIN: HCPCS | Performed by: FAMILY MEDICINE

## 2023-02-20 PROCEDURE — 99214 OFFICE O/P EST MOD 30 MIN: CPT | Performed by: FAMILY MEDICINE

## 2023-02-20 RX ORDER — DOXYCYCLINE HYCLATE 100 MG
100 TABLET ORAL 2 TIMES DAILY
Qty: 14 TABLET | Refills: 0 | Status: SHIPPED | OUTPATIENT
Start: 2023-02-20 | End: 2023-02-27

## 2023-02-20 RX ORDER — PREDNISONE 20 MG/1
20 TABLET ORAL DAILY
Qty: 5 TABLET | Refills: 0 | Status: SHIPPED | OUTPATIENT
Start: 2023-02-20 | End: 2023-02-25

## 2023-02-20 ASSESSMENT — ENCOUNTER SYMPTOMS
SORE THROAT: 0
SINUS PRESSURE: 1
DIARRHEA: 0
ABDOMINAL PAIN: 0
COUGH: 0
SHORTNESS OF BREATH: 0
EYE PAIN: 0
CONSTIPATION: 0

## 2023-02-20 NOTE — PROGRESS NOTES
Chief Complaint   Patient presents with    Otalgia     Since Friday morning          ASSESSMENT/PLAN:  1. Acute non-recurrent maxillary sinusitis  Stopped clinda because she now has an ear infection. Allergies complicating treatment. Starting doxy. Follow up in 3-5 days if no improvement   Discussed prognosis and duration of symptoms.   - doxycycline hyclate (VIBRA-TABS) 100 MG tablet; Take 1 tablet by mouth 2 times daily for 7 days  Dispense: 14 tablet; Refill: 0    2. Acute otitis media, unspecified otitis media type  Stopping clina, starting doxy  Follow up un 3-5 days if no improvement   Discussed prognosis and duration of symptoms.   - doxycycline hyclate (VIBRA-TABS) 100 MG tablet; Take 1 tablet by mouth 2 times daily for 7 days  Dispense: 14 tablet; Refill: 0    3. Hearing loss of left ear, unspecified hearing loss type  Conductive is present but sensory is not  Will start prednisone and follow up in 3-5 days to gauge improvement   - predniSONE (DELTASONE) 20 MG tablet; Take 1 tablet by mouth daily for 5 days  Dispense: 5 tablet; Refill: 0           HPI:  Vero Worthington is a 30 y.o. (: 1985) here today for left ear pain. On clinda currently due to pen allergy. Has 2 more days left with no improvement and now left ear pain. Noted loss of hearing on Friday after waking from nap. Fever: no  Sore throat: no  Cough: no  Headache: no    Review of Systems   Constitutional:  Negative for appetite change, chills, fatigue and fever. HENT:  Positive for ear pain and sinus pressure. Negative for congestion, ear discharge and sore throat. Eyes:  Negative for pain and visual disturbance. Respiratory:  Negative for cough and shortness of breath. Cardiovascular:  Negative for chest pain and palpitations. Gastrointestinal:  Negative for abdominal pain, constipation and diarrhea. Genitourinary:  Negative for difficulty urinating. Musculoskeletal:  Negative for arthralgias.    Skin:  Negative for rash and wound. Neurological:  Negative for dizziness, weakness, light-headedness and headaches. Hematological:  Does not bruise/bleed easily. Psychiatric/Behavioral:  Negative for behavioral problems. Past Medical History:   Diagnosis Date    Asthma     Chicken pox     Degenerative disc disease, lumbar     Guillain Barré syndrome ([facility] [address] 8/4/2016    Hyperlipidemia     Kidney disease     Meniere's disease     Seizure ([facility] [address]     none since 2013    Trimalleolar fracture of ankle, closed, right, initial encounter 2/27/2018    Viral hepatitis        Family History   Problem Relation Age of Onset    High Cholesterol Father     Diabetes Paternal Grandmother     High Cholesterol Paternal Grandmother     Diabetes Paternal Grandfather     High Cholesterol Paternal Grandfather     Cancer Paternal Grandfather         colon    Arthritis Mother     Depression Mother     Depression Sister     Schizophrenia Sister     Heart Defect Brother     Mult Sclerosis Paternal Aunt        Social History     Tobacco Use    Smoking status: Never    Smokeless tobacco: Never   Vaping Use    Vaping Use: Never used   Substance Use Topics    Alcohol use:  Yes     Alcohol/week: 0.0 standard drinks     Comment: rare celebratory    Drug use: Yes       New Prescriptions    DOXYCYCLINE HYCLATE (VIBRA-TABS) 100 MG TABLET    Take 1 tablet by mouth 2 times daily for 7 days    PREDNISONE (DELTASONE) 20 MG TABLET    Take 1 tablet by mouth daily for 5 days       Meds Prior to visit:  Current Outpatient Medications on File Prior to Visit   Medication Sig Dispense Refill    metoprolol succinate (TOPROL XL) 25 MG extended release tablet Take 1 tablet by mouth daily 30 tablet 3    meloxicam (MOBIC) 15 MG tablet Take 1 tablet by mouth at bedtime 30 tablet 3    amLODIPine (NORVASC) 5 MG tablet Take 1 tablet by mouth at bedtime 90 tablet 3    sertraline (ZOLOFT) 100 MG tablet TAKE 1 TABLET BY MOUTH EVERY DAY 90 tablet 0    triamterene-hydroCHLOROthiazide (DYAZIDE) 37.5-25 MG per capsule TAKE 1 CAPSULE BY MOUTH EVERY DAY 90 capsule 0    norgestimate-ethinyl estradiol (ORTHO-CYCLEN) 0.25-35 MG-MCG per tablet TAKE 1 TABLET BY MOUTH EVERY DAY 84 tablet 3    albuterol sulfate  (90 Base) MCG/ACT inhaler INHALE 2 PUFFS BY MOUTH EVERY SIX HOURS AS NEEDED FOR WHEEZING 8.5 g 0    SUMAtriptan (IMITREX) 50 MG tablet Take 1 tablet by mouth once as needed for Migraine 27 tablet 1     No current facility-administered medications on file prior to visit. Allergies   Allergen Reactions    Cortisone Anaphylaxis     fulll blackout for 5 minutes, w/ smelling salts to come to consciousness    Lamictal [Lamotrigine] Rash    Onion Anaphylaxis and Other (See Comments)     Red onion    Oxcarbazepine Rash    Penicillins Anaphylaxis    Keppra [Levetiracetam] Seizure    Promethazine Seizure    Sulfa Antibiotics Rash    Adhesive Tape Rash    Azithromycin Diarrhea    Gabapentin Anxiety, Other (See Comments) and Hallucinations     Suicidal thoughts       OBJECTIVE:  BP (!) 149/79   Pulse 90   Temp 97 °F (36.1 °C) (Temporal)   Ht 5' 5\" (1.651 m)   Wt 200 lb 3.2 oz (90.8 kg)   LMP 02/09/2023   BMI 33.32 kg/m²   BP Readings from Last 2 Encounters:   02/20/23 (!) 149/79   02/09/23 (!) 152/76     Wt Readings from Last 3 Encounters:   02/20/23 200 lb 3.2 oz (90.8 kg)   02/09/23 200 lb 6.4 oz (90.9 kg)   01/31/23 198 lb (89.8 kg)       Physical Exam  Vitals reviewed. Constitutional:       General: She is not in acute distress. Appearance: Normal appearance. She is not ill-appearing. HENT:      Head: Normocephalic and atraumatic. Right Ear: Tympanic membrane, ear canal and external ear normal. There is no impacted cerumen. Left Ear: External ear normal. There is no impacted cerumen. Nose: Nose normal. No congestion. Mouth/Throat:      Mouth: Mucous membranes are moist.      Pharynx: Oropharynx is clear. No oropharyngeal exudate.    Eyes:      Extraocular Movements: Extraocular movements intact. Conjunctiva/sclera: Conjunctivae normal.      Pupils: Pupils are equal, round, and reactive to light. Cardiovascular:      Rate and Rhythm: Normal rate and regular rhythm. Pulses: Normal pulses. Heart sounds: Normal heart sounds. Pulmonary:      Effort: Pulmonary effort is normal. No respiratory distress. Breath sounds: Normal breath sounds. No stridor. No wheezing, rhonchi or rales. Abdominal:      General: Bowel sounds are normal.      Palpations: Abdomen is soft. Tenderness: There is no abdominal tenderness. Musculoskeletal:         General: Normal range of motion. Cervical back: Normal range of motion and neck supple. Right lower leg: No edema. Left lower leg: No edema. Skin:     General: Skin is warm. Capillary Refill: Capillary refill takes less than 2 seconds. Findings: No erythema or rash. Neurological:      General: No focal deficit present. Mental Status: She is alert and oriented to person, place, and time. Mental status is at baseline. Motor: No weakness. Coordination: Coordination normal.      Gait: Gait normal.   Psychiatric:         Mood and Affect: Mood normal.         Behavior: Behavior normal.         Thought Content:  Thought content normal.         Judgment: Judgment normal.       Lab Results   Component Value Date    WBC 6.1 12/14/2022    HGB 14.2 12/14/2022    HCT 41.0 12/14/2022    MCV 85.3 12/14/2022     12/14/2022     Lab Results   Component Value Date     12/14/2022    K 4.2 12/14/2022     12/14/2022    CO2 23 12/14/2022    BUN 7 12/14/2022    CREATININE 0.5 (L) 12/14/2022    GLUCOSE 124 (H) 12/14/2022    CALCIUM 8.5 12/14/2022    PROT 6.3 (L) 12/14/2022    LABALBU 4.0 12/14/2022    LABALBU 4.0 12/14/2022    BILITOT <0.2 12/14/2022    ALKPHOS 66 12/14/2022    AST 22 12/14/2022    ALT 17 12/14/2022    LABGLOM >60 12/14/2022    GFRAA >60 03/22/2022    AGRATIO 1.5 12/12/2022    GLOB 2.9 03/03/2021     Lab Results   Component Value Date    CHOL 204 (H) 07/01/2020    CHOL 228 (H) 10/31/2013    CHOL 215 (H) 03/30/2013     Lab Results   Component Value Date    TRIG 292 (H) 07/01/2020    TRIG 178 (H) 10/31/2013    TRIG 154 (H) 03/30/2013     Lab Results   Component Value Date    HDL 42 07/01/2020    HDL 56 10/31/2013    HDL 59 03/30/2013     Lab Results   Component Value Date    LDLCALC 104 (H) 07/01/2020    LDLCALC 136 (H) 10/31/2013    LDLCALC 125 (H) 03/30/2013     Lab Results   Component Value Date    LABVLDL 58 07/01/2020    LABVLDL 36 10/31/2013    LABVLDL 31 03/30/2013     Lab Results   Component Value Date    LABA1C 5.5 03/22/2022         Discussed use, benefit, and side effects of prescribed medications. Barriers to medication compliance addressed. All patient questions answered. Pt voiced understanding. RTC Return in about 5 days (around 2/25/2023).     Future Appointments   Date Time Provider Abelardo Cardona   9/12/2023  3:00 PM MD Mimi Dwyer MD  2/20/2023  2:45 PM

## 2023-02-28 DIAGNOSIS — H91.92 HEARING LOSS OF LEFT EAR, UNSPECIFIED HEARING LOSS TYPE: Primary | ICD-10-CM

## 2023-03-01 RX ORDER — MELATONIN
Qty: 90 TABLET | Refills: 0 | Status: SHIPPED | OUTPATIENT
Start: 2023-03-01

## 2023-03-01 NOTE — TELEPHONE ENCOUNTER
Medication:   Requested Prescriptions     Pending Prescriptions Disp Refills    vitamin D3 (CHOLECALCIFEROL) 25 MCG (1000 UT) TABS tablet [Pharmacy Med Name: Vitamin D3 Oral Tablet 25 MCG (1000 UT)] 90 tablet 0     Sig: TAKE 1 TABLET BY MOUTH EVERY DAY        Last Filled:  don't see it in chart     Patient Phone Number: [phone] (home)     Last appt: 2/20/2023   Next appt: Visit date not found    Last OARRS:   RX Monitoring 10/9/2017   Attestation The Prescription Monitoring Report for this patient was reviewed today. Periodic Controlled Substance Monitoring No signs of potential drug abuse or diversion identified.

## 2023-03-06 DIAGNOSIS — H91.90 HEARING LOSS, UNSPECIFIED HEARING LOSS TYPE, UNSPECIFIED LATERALITY: Primary | ICD-10-CM

## 2023-03-08 ENCOUNTER — PROCEDURE VISIT (OUTPATIENT)
Dept: AUDIOLOGY | Age: 38
End: 2023-03-08
Payer: COMMERCIAL

## 2023-03-08 ENCOUNTER — OFFICE VISIT (OUTPATIENT)
Dept: ENT CLINIC | Age: 38
End: 2023-03-08
Payer: COMMERCIAL

## 2023-03-08 VITALS
DIASTOLIC BLOOD PRESSURE: 87 MMHG | HEART RATE: 92 BPM | HEIGHT: 65 IN | BODY MASS INDEX: 33.49 KG/M2 | OXYGEN SATURATION: 98 % | SYSTOLIC BLOOD PRESSURE: 138 MMHG | TEMPERATURE: 97.5 F | WEIGHT: 201 LBS

## 2023-03-08 DIAGNOSIS — R42 DIZZINESS AND GIDDINESS: ICD-10-CM

## 2023-03-08 DIAGNOSIS — H90.42 SENSORINEURAL HEARING LOSS (SNHL) OF LEFT EAR WITH UNRESTRICTED HEARING OF RIGHT EAR: Primary | ICD-10-CM

## 2023-03-08 DIAGNOSIS — H93.12 TINNITUS, LEFT EAR: ICD-10-CM

## 2023-03-08 DIAGNOSIS — H91.20 SUDDEN-ONSET SENSORINEURAL HEARING LOSS: ICD-10-CM

## 2023-03-08 DIAGNOSIS — H90.42 SENSORINEURAL HEARING LOSS (SNHL) OF LEFT EAR WITH UNRESTRICTED HEARING OF RIGHT EAR: ICD-10-CM

## 2023-03-08 DIAGNOSIS — H91.8X9 ASYMMETRICAL HEARING LOSS: Primary | ICD-10-CM

## 2023-03-08 PROCEDURE — 99204 OFFICE O/P NEW MOD 45 MIN: CPT | Performed by: OTOLARYNGOLOGY

## 2023-03-08 PROCEDURE — 92567 TYMPANOMETRY: CPT | Performed by: AUDIOLOGIST

## 2023-03-08 PROCEDURE — 92504 EAR MICROSCOPY EXAMINATION: CPT | Performed by: OTOLARYNGOLOGY

## 2023-03-08 PROCEDURE — 92557 COMPREHENSIVE HEARING TEST: CPT | Performed by: AUDIOLOGIST

## 2023-03-08 PROCEDURE — G8417 CALC BMI ABV UP PARAM F/U: HCPCS | Performed by: OTOLARYNGOLOGY

## 2023-03-08 PROCEDURE — G8427 DOCREV CUR MEDS BY ELIG CLIN: HCPCS | Performed by: OTOLARYNGOLOGY

## 2023-03-08 PROCEDURE — 1036F TOBACCO NON-USER: CPT | Performed by: OTOLARYNGOLOGY

## 2023-03-08 PROCEDURE — G8484 FLU IMMUNIZE NO ADMIN: HCPCS | Performed by: OTOLARYNGOLOGY

## 2023-03-08 RX ORDER — PREDNISONE 20 MG/1
TABLET ORAL
Qty: 23 TABLET | Refills: 0 | Status: SHIPPED | OUTPATIENT
Start: 2023-03-08 | End: 2023-03-18

## 2023-03-08 ASSESSMENT — ENCOUNTER SYMPTOMS
BACK PAIN: 0
DIARRHEA: 0
SORE THROAT: 0
VOMITING: 0
CONSTIPATION: 0
FACIAL SWELLING: 0
VOICE CHANGE: 0
WHEEZING: 0
TROUBLE SWALLOWING: 0
EYE DISCHARGE: 0
SHORTNESS OF BREATH: 0
BLOOD IN STOOL: 0
PHOTOPHOBIA: 0
EYE ITCHING: 0
STRIDOR: 0
SINUS PAIN: 0
COLOR CHANGE: 0
COUGH: 0
SINUS PRESSURE: 0
CHOKING: 0
NAUSEA: 0
RHINORRHEA: 0

## 2023-03-08 NOTE — PROGRESS NOTES
[facility]  [address] [address] [facility], [address] [place] [address]  P: [phone]  F: [phone]       Patient     Mary Springer  1985    ChiefComplaint     Chief Complaint   Patient presents with    New Patient     Patient is here today because she has hearing loss in left ear, she had a sinus infection the month of February        History of Present Illness     Mary Springer is a pleasant 30 y.o. female who presents as a new patient for sudden hearing loss of the left ear. In February, patient had upper respiratory infection. During this, she woke up from a nap and noticed she did not have hearing in the left ear. Occurred about 3 weeks ago. Just prior to the hearing loss, she had some significant pain in her ear that then resolved after waking up from a nap. She also endorses some roaring tinnitus of the left ear as well as some numbness of her left ear canal.  Denies any room spinning, otorrhea, history of ear infections or ear surgeries. She was diagnosed with Ménière's disease in her teenage years. She also has a strong history of migraine. Denies any family history of ear problems. She has a \"allergy\" to cortisone injections. She takes oral steroids fine. She states she had a vasovagal event when she was injected with cortisone in the past.  She did not develop any urticaria or swelling at that time.     Past Medical History     Past Medical History:   Diagnosis Date    Asthma     Chicken pox     Degenerative disc disease, lumbar     Guillain Barré syndrome ([facility] [address] 8/4/2016    Hyperlipidemia     Kidney disease     Meniere's disease     Seizure ([facility] [address]     none since 2013    Trimalleolar fracture of ankle, closed, right, initial encounter 2/27/2018    Viral hepatitis        Past Surgical History     Past Surgical History:   Procedure Laterality Date    ANKLE FRACTURE SURGERY Right 03/02/2018    orif trimalleolar -rods/pins    CHOLECYSTECTOMY, LAPAROSCOPIC N/A 12/13/2022 ROBOTIC CHOLECYSTECTOMY performed by Summer Vallecillo DO at [address] EXTRACTION         Family History     Family History   Problem Relation Age of Onset    High Cholesterol Father     Diabetes Paternal Grandmother     High Cholesterol Paternal Grandmother     Diabetes Paternal Grandfather     High Cholesterol Paternal Grandfather     Cancer Paternal Grandfather         colon    Arthritis Mother     Depression Mother     Depression Sister     Schizophrenia Sister     Heart Defect Brother     Mult Sclerosis Paternal Aunt        Social History     Social History     Socioeconomic History    Marital status:      Spouse name: Not on file    Number of children: Not on file    Years of education: Not on file    Highest education level: Not on file   Occupational History    Not on file   Tobacco Use    Smoking status: Never    Smokeless tobacco: Never   Vaping Use    Vaping Use: Never used   Substance and Sexual Activity    Alcohol use:  Yes     Alcohol/week: 0.0 standard drinks     Comment: rare celebratory    Drug use: Yes    Sexual activity: Yes     Partners: Male     Birth control/protection: Pill   Other Topics Concern    Not on file   Social History Narrative    Not on file     Social Determinants of Health     Financial Resource Strain: Not on file   Food Insecurity: Not on file   Transportation Needs: Not on file   Physical Activity: Not on file   Stress: Not on file   Social Connections: Not on file   Intimate Partner Violence: Not on file   Housing Stability: Not on file       Allergies     Allergies   Allergen Reactions    Cortisone Anaphylaxis     fulll blackout for 5 minutes, w/ smelling salts to come to consciousness    Lamictal [Lamotrigine] Rash    Onion Anaphylaxis and Other (See Comments)     Red onion    Oxcarbazepine Rash    Penicillins Anaphylaxis    Keppra [Levetiracetam] Seizure    Promethazine Seizure    Sulfa Antibiotics Rash    Adhesive Tape Rash    Azithromycin Diarrhea    Gabapentin Anxiety, Other (See Comments) and Hallucinations     Suicidal thoughts       Medications     Current Outpatient Medications   Medication Sig Dispense Refill    predniSONE (DELTASONE) 20 MG tablet Take 3 tablets by mouth daily for 5 days, THEN 2 tablets daily for 3 days, THEN 1 tablet daily for 2 days. 23 tablet 0    vitamin D3 (CHOLECALCIFEROL) 25 MCG (1000 UT) TABS tablet TAKE 1 TABLET BY MOUTH EVERY DAY 90 tablet 0    metoprolol succinate (TOPROL XL) 25 MG extended release tablet Take 1 tablet by mouth daily 30 tablet 3    meloxicam (MOBIC) 15 MG tablet Take 1 tablet by mouth at bedtime 30 tablet 3    amLODIPine (NORVASC) 5 MG tablet Take 1 tablet by mouth at bedtime 90 tablet 3    sertraline (ZOLOFT) 100 MG tablet TAKE 1 TABLET BY MOUTH EVERY DAY 90 tablet 0    triamterene-hydroCHLOROthiazide (DYAZIDE) 37.5-25 MG per capsule TAKE 1 CAPSULE BY MOUTH EVERY DAY 90 capsule 0    norgestimate-ethinyl estradiol (ORTHO-CYCLEN) 0.25-35 MG-MCG per tablet TAKE 1 TABLET BY MOUTH EVERY DAY 84 tablet 3    albuterol sulfate  (90 Base) MCG/ACT inhaler INHALE 2 PUFFS BY MOUTH EVERY SIX HOURS AS NEEDED FOR WHEEZING 8.5 g 0    SUMAtriptan (IMITREX) 50 MG tablet Take 1 tablet by mouth once as needed for Migraine 27 tablet 1     No current facility-administered medications for this visit. Review of Systems     Review of Systems   Constitutional:  Negative for activity change, appetite change, chills, diaphoresis, fatigue, fever and unexpected weight change. HENT:  Positive for hearing loss and tinnitus. Negative for congestion, dental problem, drooling, ear discharge, ear pain, facial swelling, mouth sores, nosebleeds, postnasal drip, rhinorrhea, sinus pressure, sinus pain, sneezing, sore throat, trouble swallowing and voice change. Eyes:  Negative for photophobia, discharge, itching and visual disturbance.    Respiratory:  Negative for cough, choking, shortness of breath, wheezing and stridor. Gastrointestinal:  Negative for blood in stool, constipation, diarrhea, nausea and vomiting. Endocrine: Negative for cold intolerance, heat intolerance, polyphagia and polyuria. Musculoskeletal:  Negative for back pain, gait problem, neck pain and neck stiffness. Skin:  Negative for color change, pallor, rash and wound. Neurological:  Negative for dizziness, syncope, facial asymmetry, speech difficulty, light-headedness, numbness and headaches. Hematological:  Negative for adenopathy. Does not bruise/bleed easily. Psychiatric/Behavioral:  Negative for agitation, confusion and sleep disturbance. PhysicalExam     Vitals:    03/08/23 1600   BP: 138/87   Pulse: 92   Temp: 97.5 °F (36.4 °C)   SpO2: 98%       Physical Exam  Constitutional:       Appearance: She is well-developed. HENT:      Head: Normocephalic and atraumatic. Not macrocephalic and not microcephalic. No raccoon eyes, Rodriguez's sign, abrasion, contusion, right periorbital erythema, left periorbital erythema or laceration. Hair is normal.      Jaw: No trismus. Right Ear: Hearing, tympanic membrane and external ear normal. No decreased hearing noted. No drainage, swelling or tenderness. No middle ear effusion. No mastoid tenderness. Tympanic membrane is not perforated, retracted or bulging. Tympanic membrane has normal mobility. Left Ear: Hearing, tympanic membrane and external ear normal. No decreased hearing noted. No drainage, swelling or tenderness. No middle ear effusion. No mastoid tenderness. Tympanic membrane is not perforated, retracted or bulging. Tympanic membrane has normal mobility. Ears:        Nose: No nasal deformity, septal deviation, laceration, mucosal edema or rhinorrhea. Right Sinus: No maxillary sinus tenderness or frontal sinus tenderness. Left Sinus: No maxillary sinus tenderness or frontal sinus tenderness.       Mouth/Throat:      Mouth: Mucous membranes are not pale, not dry and not cyanotic. No lacerations or oral lesions. Dentition: Normal dentition. No dental caries or dental abscesses. Pharynx: Uvula midline. No oropharyngeal exudate, posterior oropharyngeal erythema or uvula swelling. Tonsils: No tonsillar abscesses. Eyes:      General: Lids are normal.         Right eye: No discharge. Left eye: No discharge. Extraocular Movements:      Right eye: Normal extraocular motion and no nystagmus. Left eye: Normal extraocular motion and no nystagmus. Conjunctiva/sclera:      Right eye: No chemosis or exudate. Left eye: No chemosis or exudate. Neck:      Thyroid: No thyroid mass or thyromegaly. Vascular: Normal carotid pulses. Trachea: No tracheal tenderness or tracheal deviation. Cardiovascular:      Rate and Rhythm: Normal rate and regular rhythm. Pulmonary:      Effort: No tachypnea, bradypnea or respiratory distress. Breath sounds: No stridor. Musculoskeletal:      Right shoulder: Normal range of motion. Cervical back: Neck supple. Lymphadenopathy:      Head:      Right side of head: No submental, submandibular, tonsillar, preauricular, posterior auricular or occipital adenopathy. Left side of head: No submental, submandibular, tonsillar, preauricular, posterior auricular or occipital adenopathy. Cervical: No cervical adenopathy. Right cervical: No superficial, deep or posterior cervical adenopathy. Left cervical: No superficial, deep or posterior cervical adenopathy. Skin:     General: Skin is warm and dry. Findings: No bruising, erythema, laceration, lesion or rash. Neurological:      Mental Status: She is alert and oriented to person, place, and time. Cranial Nerves: No cranial nerve deficit.    Psychiatric:         Speech: Speech normal.         Behavior: Behavior normal.         Procedure     Otomicroscopy    An operating microscope was utilized to visualize the external auditory canals using a 4mm speculum. The external auditory canals are clear. The tympanic membrane is intact. Ossicles appear intact. No fluid visualized in the middle ear.        Assessment and Plan     1. Asymmetrical hearing loss  Patient has a left-sided asymmetric sensorineural loss that is sudden in nature.  This occurred during an upper respiratory infection.  Examination is essentially normal today except for some mild numbness of the left ear canal.  Audiogram does reveal significant asymmetry of hearing in the left ear with a mild to moderate severe downward sloping loss and decreased word recognition on the left.  Given the asymmetry, I recommend MRI of the IAC to rule out a retrocochlear tumor.  I have increased suspicion for vestibular schwannoma given the numbness of the left ear canal.      In the meantime, I recommend high-dose 10-day course of prednisone.  She is just at the border of the window for effectiveness of steroids for sudden loss.  Follow-up in 2 weeks with audiogram.  Pending symptom improvement, may consider transtympanic dexamethasone injections.  Proper administration and risks of prednisone discussed with the patient.  Patient does not have a true allergy to steroids and does okay with oral prednisone.  - predniSONE (DELTASONE) 20 MG tablet; Take 3 tablets by mouth daily for 5 days, THEN 2 tablets daily for 3 days, THEN 1 tablet daily for 2 days.  Dispense: 23 tablet; Refill: 0  - MRI IAC POSTERIOR FOSSA W WO CONTRAST; [facility] AU[facility]    2. Sudden-onset sensorineural hearing loss    - predniSONE (DELTASONE) 20 MG tablet; Take 3 tablets by mouth daily for 5 days, THEN 2 tablets daily for 3 days, THEN 1 tablet daily for 2 days.  Dispense: 23 tablet; Refill: 0  - MRI IAC POSTERIOR FOSSA W WO CONTRAST; [facility] AU[facility]    3. Sensorineural hearing loss (SNHL) of  left ear with unrestricted hearing of right ear    - predniSONE (DELTASONE) 20 MG tablet; Take 3 tablets by mouth daily for 5 days, THEN 2 tablets daily for 3 days, THEN 1 tablet daily for 2 days. Dispense: 23 tablet; Refill: 0  - MRI IAC POSTERIOR FOSSA W WO CONTRAST; [facility]  - [address], [place]. MICKIE., Audiology, [facility]      Return in about 2 weeks (around 3/22/2023) for with audiogram.      [ ] Review/order radiology tests   [ ] Independent interpretation of diagnostic test by another provider  [ ] Discussed case with another provider  [ ] High risk of loss of major body function  [ ] Elective major surgery with risk factors    Portions of this note were dictated using Dragon.  There may be linguistic errors secondary to the use of this program.

## 2023-03-08 NOTE — PROGRESS NOTES
Shirley Andrade   1985, 30 y.o. female   [id number]       Referring Provider: Dulce Maria Patterson DO  Referral Type: In an order in [facility]    Reason for Visit: Evaluation of suspected change in hearing, tinnitus, or balance. ADULT AUDIOLOGIC EVALUATION      Shirley Andrade is a 30 y.o. female seen today, 3/8/2023, for an initial audiologic evaluation in this office. Patient was seen accompanied by her sons. AUDIOLOGIC AND OTHER PERTINENT MEDICAL HISTORY:        [employer] noted decreased hearing in left ear for about a month, had an ear infection and noted she took a nap and woke up with no hearing in her left ear, it may be somewhat better but not back to her baseline; some tinnitus in left ear; dizziness often; left ear feels full; history of PE tubes in the past.      Shirley Andrade denied otalgia, otorrhea, history of occupational/recreational noise exposure, and family history of hearing loss. IMPRESSIONS:       Today's results are consistent with left ear sensorineural hearing loss with good word recognition and right ear within normal limits with excellent word recognition; both ears with normal middle ear function. Hearing loss is significant enough to result in difficulty understanding speech in most listening environments. Follow medical recommendations from Dr. Naveed Storey. ASSESSMENT AND FINDINGS:       Otoscopy revealed: Clear ear canals bilaterally      RIGHT EAR:  Hearing Sensitivity: Within normal limits. Speech Recognition Threshold: 10 dBHL  Word Recognition: Excellent (100%), based on NU-6 25-word list at 45 dBHL using recorded speech stimuli. Tympanometry: Normal peak pressure and compliance, Type A tympanogram, consistent with normal middle ear function. LEFT EAR:  Hearing Sensitivity: Mild sloping to moderately-severe sensorineural hearing loss.   Speech Recognition Threshold: 50 dBHL, masked  Word Recognition: Good (80%), based on NU-6 25-word list at 85 dBHL, masked, using recorded speech stimuli. NOTE: 76% (Fair) at 95 dBHL, masked. Tympanometry: Normal peak pressure and compliance, Type A tympanogram, consistent with normal middle ear function. NOTE: An asymmetry of greater than 15 dBHL is present 500-8000 Hz with left ear worse than right ear; there is also a clinically significant difference in word recognition with left ear (80%) worse than right ear (100%). Reliability: Good  Transducer: Inserts    See scanned audiogram dated 3/8/2023 for results. PATIENT EDUCATION:       The following items were discussed with the patient:   - Good Communication Strategies  - Hearing Loss and Hearing Aids    Educational information was shared in the After Visit Summary. RECOMMENDATIONS:                                                                                                                                                                                                                                                                      The following items are recommended based on patient report and results from today's appointment:  - Continue medical follow-up with Sulema Gallego DO.  - Retest hearing as medically indicated and/or sooner if a change in hearing is noted. - If desired, schedule a Hearing Aid Evaluation (HAE) appointment to discuss hearing aid options. - Utilize \"Good Communication Strategies\" as discussed to assist in speech understanding with communication partners. - Maintain a sound enriched environment to assist in the management of tinnitus symptoms. - If medically indicated, consider vestibular evaluation to further investigate symptoms of dizziness. [facility] [place], [place]  Audiologist       Chart CC'd to:  Sulema Gallego DO      Degree of   Hearing Sensitivity dB Range   Within Normal Limits (WNL) 0 - 20   Mild 20 - 40   Moderate 40 - 55   Moderately-Severe 55 - 70 Severe 70 - 90   Profound 90 +

## 2023-03-08 NOTE — Clinical Note
Dr. Jorge Simmons,  Please see note from this patient's audiogram.  Please let me know if there is anything further you need.    Radha Bhakta [address] Audiologist

## 2023-03-08 NOTE — PATIENT INSTRUCTIONS
Good Communication Strategies    Communication can be challenging for anyone, but can be especially difficult for those with some degree of hearing loss. While we may not be able to control every factor that may lead to difficulty with communication, there are Good Communication Strategies that we can all use in our day-to-day lives. Communication takes both parties working together for it to be successful. Tips as a Listener:   Control your environment. It is important to limit the amount of background noise in the room when possible. You should also consider having a good light source in the room to best see the other person. Ask for clarification. Instead of saying \"What?\", you can use parts of what you heard to make a new question. For example, if you heard the word \"Thursday\" but not the rest of the week, you may ask \"What was that about Thursday? \" or \"What did you want to do Thursday? \". This shows the person talking that you are listening and will help them better explain what they are saying. Be an advocate for yourself. If you are hearing but not understanding, tell the other person \"I can hear you, but I need you to slow down when you speak. \"  Or if someone is facing the other direction, say \"I cannot hear you when you are not looking at me when we talk. \"       Tips as a Talker:   - Sit or stand 3 to 6 feet away to maximize audibility         -- It is unrealistic to believe someone else will fully hear your message if you are speaking from across the room or in a different room in the house   - Stay at eye level to help with visual cues   - Make sure you have the persons attention before speaking   - Use facial expressions and gestures to accentuate your message   - Raise your voice slightly (do not scream)   - Speak slowly and distinctly   - Use short, simple sentences   - Rephrase your words if the person is having a hard time understanding you    - To avoid distortion, dont speak directly into a persons ear      Some additional items that may be helpful:   - Remain patient - this is important for both parties   - Write down items that still cannot be heard/understood. You may write with pen/paper or consider typing/texting on a cell phone or smart device. - If background noise is unavoidable, try to keep yourself in a good position in the room. By sitting at a medina on the side of the restaurant (preferably a corner), it will be easier to communicate than if you were sitting at a table in the middle with background noise surrounding you. Keep yourself positioned away from music speakers or heavy foot traffic.   - If you have difficulty with the television, consider these options:      -- Use closed-captioning, which is a setting you can turn on that displays the spoken words in a written form on the screen. There may be a slight delay, but this can help fill in missing information. This can be especially helpful when watching programs with accented speech. -- Consider use of a sound bar or speakers that come from the front of the TV. With modern flat screen TVs, many of them have speakers that come out of the back of the device, which makes sound bounce off the wall behind it, then go into the room. Sound bars can allow the sound to go straight in your direction and can improve sound quality. -- Consider ear level devices to help improve the volume and/or sound quality of the program.  There are devices that work like headphones that you can adjust the volume for your ears while others can have the volume at a more comfortable level, such as \"TV Ears\". Most hearing aids have devices that allow them to connect directly to the TV and improve sound quality. Hearing Loss: Care Instructions  Your Care Instructions      Hearing loss is a sudden or slow decrease in how well you hear. It can range from mild to profound.  Permanent hearing loss can occur with aging, and it can happen when you are exposed long-term to loud noise. Examples include listening to loud music, riding motorcycles, or being around other loud machines. Hearing loss can affect your work and home life. It can make you feel lonely or depressed. You may feel that you have lost your independence. But hearing aids and other devices can help you hear better and feel connected to others. Follow-up care is a key part of your treatment and safety. Be sure to make and go to all appointments, and call your doctor if you are having problems. It's also a good idea to know your test results and keep a list of the medicines you take. How can you care for yourself at home? Avoid loud noises whenever possible. This helps keep your hearing from getting worse. Always wear hearing protection around loud noises. If appropriate, wear hearing aid(s) as directed. It is recommended that hearing aids are worn during all waking hours to keep your brain active and give it access to the sounds it is missing. If you are beginning your process with hearing aid(s), schedule a \"Hearing Aid Evaluation\" with an audiologist to discuss your lifestyle, features of hearing aid technology, and styles of hearing aids available. It is recommended that you contact your insurance company to determine if you have a hearing aid benefit, as this may dictate who you can see for these services. Have hearing tests as your doctor suggests. They can show whether your hearing has changed. Your hearing aid may need to be adjusted. Use other assistive devices as needed. These may include:  Telephone amplifiers and hearing aids that can connect to a television, stereo, radio, or microphone. Devices that use lights or vibrations. These alert you to the doorbell, a ringing telephone, or a baby monitor. Television closed-captioning. This shows the words at the bottom of the screen. Most new TVs can do this. TTY (text telephone).  This lets you type messages back and forth on the telephone instead of talking or listening. These devices are also called TDD. When messages are typed on the keyboard, they are sent over the phone line to a receiving TTY. The message is shown on a monitor. Use pagers, fax machines, text, and email if it is hard for you to communicate by telephone. Try to learn a listening technique called speech-reading. It is not lip-reading. You pay attention to people's gestures, expressions, posture, and tone of voice. These clues can help you understand what a person is saying. Face the person you are talking to, and have him or her face you. Make sure the lighting is good. You need to see the other person's face clearly. Think about counseling if you need help to adjust to your hearing loss. When should you call for help? Watch closely for changes in your health, and be sure to contact your doctor if:    You think your hearing is getting worse. You have new symptoms, such as dizziness or nausea.

## 2023-03-10 ENCOUNTER — HOSPITAL ENCOUNTER (OUTPATIENT)
Dept: MRI IMAGING | Age: 38
Discharge: HOME | End: 2023-03-10
Payer: COMMERCIAL

## 2023-03-10 DIAGNOSIS — H90.42 SENSORINEURAL HEARING LOSS (SNHL) OF LEFT EAR WITH UNRESTRICTED HEARING OF RIGHT EAR: ICD-10-CM

## 2023-03-10 DIAGNOSIS — H91.8X9 ASYMMETRICAL HEARING LOSS: ICD-10-CM

## 2023-03-10 DIAGNOSIS — H91.20 SUDDEN-ONSET SENSORINEURAL HEARING LOSS: ICD-10-CM

## 2023-03-10 PROCEDURE — 2580000003 HC RX 258: Performed by: OTOLARYNGOLOGY

## 2023-03-10 PROCEDURE — A9579 GAD-BASE MR CONTRAST NOS,1ML: HCPCS | Performed by: OTOLARYNGOLOGY

## 2023-03-10 PROCEDURE — 70553 MRI BRAIN STEM W/O & W/DYE: CPT

## 2023-03-10 PROCEDURE — 6360000004 HC RX CONTRAST MEDICATION: Performed by: OTOLARYNGOLOGY

## 2023-03-10 RX ORDER — SODIUM CHLORIDE 0.9 % (FLUSH) 0.9 %
5-40 SYRINGE (ML) INJECTION 2 TIMES DAILY
Status: DISCONTINUED | OUTPATIENT
Start: 2023-03-10 | End: 2023-03-11 | Stop reason: HOSPADM

## 2023-03-10 RX ADMIN — SODIUM CHLORIDE, PRESERVATIVE FREE 10 ML: 5 INJECTION INTRAVENOUS at 09:36

## 2023-03-10 RX ADMIN — GADOTERIDOL 18 ML: 279.3 INJECTION, SOLUTION INTRAVENOUS at 09:35

## 2023-03-14 ENCOUNTER — TELEPHONE (OUTPATIENT)
Dept: ENT CLINIC | Age: 38
End: 2023-03-14

## 2023-03-14 NOTE — TELEPHONE ENCOUNTER
----- Message from Lucy Landa DO sent at 3/13/2023  7:32 AM EDT -----  MRI is normal. Follow up as scheduled.

## 2023-03-22 ENCOUNTER — PROCEDURE VISIT (OUTPATIENT)
Dept: AUDIOLOGY | Age: 38
End: 2023-03-22

## 2023-03-22 ENCOUNTER — OFFICE VISIT (OUTPATIENT)
Dept: ENT CLINIC | Age: 38
End: 2023-03-22
Payer: COMMERCIAL

## 2023-03-22 VITALS
SYSTOLIC BLOOD PRESSURE: 133 MMHG | DIASTOLIC BLOOD PRESSURE: 85 MMHG | HEIGHT: 65 IN | HEART RATE: 90 BPM | WEIGHT: 209 LBS | BODY MASS INDEX: 34.82 KG/M2 | TEMPERATURE: 97.2 F | OXYGEN SATURATION: 96 %

## 2023-03-22 DIAGNOSIS — H91.8X9 ASYMMETRICAL HEARING LOSS: Primary | ICD-10-CM

## 2023-03-22 DIAGNOSIS — H93.8X2 EAR PRESSURE, LEFT: ICD-10-CM

## 2023-03-22 DIAGNOSIS — H93.12 TINNITUS, LEFT EAR: ICD-10-CM

## 2023-03-22 DIAGNOSIS — H90.42 SENSORINEURAL HEARING LOSS (SNHL) OF LEFT EAR WITH UNRESTRICTED HEARING OF RIGHT EAR: ICD-10-CM

## 2023-03-22 DIAGNOSIS — H90.42 SENSORINEURAL HEARING LOSS (SNHL) OF LEFT EAR WITH UNRESTRICTED HEARING OF RIGHT EAR: Primary | ICD-10-CM

## 2023-03-22 DIAGNOSIS — J01.90 ACUTE BACTERIAL SINUSITIS: ICD-10-CM

## 2023-03-22 DIAGNOSIS — H91.20 SUDDEN-ONSET SENSORINEURAL HEARING LOSS: ICD-10-CM

## 2023-03-22 DIAGNOSIS — J32.0 CHRONIC MAXILLARY SINUSITIS: ICD-10-CM

## 2023-03-22 DIAGNOSIS — H93.12 TINNITUS, LEFT: ICD-10-CM

## 2023-03-22 DIAGNOSIS — B96.89 ACUTE BACTERIAL SINUSITIS: ICD-10-CM

## 2023-03-22 PROCEDURE — 1036F TOBACCO NON-USER: CPT | Performed by: OTOLARYNGOLOGY

## 2023-03-22 PROCEDURE — G8484 FLU IMMUNIZE NO ADMIN: HCPCS | Performed by: OTOLARYNGOLOGY

## 2023-03-22 PROCEDURE — 69801 INCISE INNER EAR: CPT | Performed by: OTOLARYNGOLOGY

## 2023-03-22 PROCEDURE — G8417 CALC BMI ABV UP PARAM F/U: HCPCS | Performed by: OTOLARYNGOLOGY

## 2023-03-22 PROCEDURE — 99214 OFFICE O/P EST MOD 30 MIN: CPT | Performed by: OTOLARYNGOLOGY

## 2023-03-22 PROCEDURE — G8428 CUR MEDS NOT DOCUMENT: HCPCS | Performed by: OTOLARYNGOLOGY

## 2023-03-22 RX ORDER — DOXYCYCLINE HYCLATE 100 MG
100 TABLET ORAL 2 TIMES DAILY
Qty: 42 TABLET | Refills: 0 | Status: SHIPPED | OUTPATIENT
Start: 2023-03-22 | End: 2023-04-12

## 2023-03-22 RX ORDER — DEXAMETHASONE SODIUM PHOSPHATE 10 MG/ML
10 INJECTION INTRAMUSCULAR; INTRAVENOUS ONCE
Status: COMPLETED | OUTPATIENT
Start: 2023-03-22 | End: 2023-03-22

## 2023-03-22 RX ADMIN — DEXAMETHASONE SODIUM PHOSPHATE 10 MG: 10 INJECTION INTRAMUSCULAR; INTRAVENOUS at 14:09

## 2023-03-22 ASSESSMENT — ENCOUNTER SYMPTOMS
SHORTNESS OF BREATH: 0
EYE ITCHING: 0
EYE PAIN: 0
PHOTOPHOBIA: 0
EYE REDNESS: 0
STRIDOR: 0
SORE THROAT: 0
CHOKING: 0
TROUBLE SWALLOWING: 0
VOICE CHANGE: 0
COLOR CHANGE: 0
NAUSEA: 0
RHINORRHEA: 1
SINUS PRESSURE: 1
FACIAL SWELLING: 0
COUGH: 0
SINUS PAIN: 0
DIARRHEA: 0

## 2023-03-22 NOTE — PROGRESS NOTES
on 3/8/2023 at [facility] ENT: Stable hearing sensitivity and word recognition. NOTE: An asymmetry of at least 15 dBHL is present 250-8000 Hz with left ear worse than right ear; there is also a clinically significant difference in word recognition with left ear (76%) worse than right ear (96%). Reliability: Good  Transducer: Inserts    See scanned audiogram dated 3/22/2023 for results. PATIENT EDUCATION:       The following items were discussed with the patient:   - Good Communication Strategies  - Hearing Loss and Hearing Aids    Educational information was shared in the After Visit Summary. RECOMMENDATIONS:                                                                                                                                                                                                                                                                      The following items are recommended based on patient report and results from today's appointment:  - Continue medical follow-up with Daphne Garcia DO.  - Retest hearing as medically indicated and/or sooner if a change in hearing is noted. - If desired, schedule a Hearing Aid Evaluation (HAE) appointment to discuss hearing aid options. - Utilize \"Good Communication Strategies\" as discussed to assist in speech understanding with communication partners. - Maintain a sound enriched environment to assist in the management of tinnitus symptoms. [facility] [place], [place]  Audiologist       Chart CC'd to:  Daphne Garcia DO      Degree of   Hearing Sensitivity dB Range   Within Normal Limits (WNL) 0 - 20   Mild 20 - 40   Moderate 40 - 55   Moderately-Severe 55 - 70   Severe 70 - 90   Profound 90 +

## 2023-03-22 NOTE — PROGRESS NOTES
N/A 12/13/2022    ROBOTIC CHOLECYSTECTOMY performed by Huan Elam DO at [address] EXTRACTION         Family History     Family History   Problem Relation Age of Onset    High Cholesterol Father     Diabetes Paternal Grandmother     High Cholesterol Paternal Grandmother     Diabetes Paternal Grandfather     High Cholesterol Paternal Grandfather     Cancer Paternal Grandfather         colon    Arthritis Mother     Depression Mother     Depression Sister     Schizophrenia Sister     Heart Defect Brother     Mult Sclerosis Paternal Aunt        Social History     Social History     Socioeconomic History    Marital status:      Spouse name: Not on file    Number of children: Not on file    Years of education: Not on file    Highest education level: Not on file   Occupational History    Not on file   Tobacco Use    Smoking status: Never    Smokeless tobacco: Never   Vaping Use    Vaping Use: Never used   Substance and Sexual Activity    Alcohol use:  Yes     Alcohol/week: 0.0 standard drinks     Comment: rare celebratory    Drug use: Yes    Sexual activity: Yes     Partners: Male     Birth control/protection: Pill   Other Topics Concern    Not on file   Social History Narrative    Not on file     Social Determinants of Health     Financial Resource Strain: Not on file   Food Insecurity: Not on file   Transportation Needs: Not on file   Physical Activity: Not on file   Stress: Not on file   Social Connections: Not on file   Intimate Partner Violence: Not on file   Housing Stability: Not on file       Allergies     Allergies   Allergen Reactions    Cortisone Anaphylaxis     fulll blackout for 5 minutes, w/ smelling salts to come to consciousness    Lamictal [Lamotrigine] Rash    Onion Anaphylaxis and Other (See Comments)     Red onion    Oxcarbazepine Rash    Penicillins Anaphylaxis    Keppra [Levetiracetam] Seizure    Promethazine Seizure    Sulfa Antibiotics

## 2023-03-22 NOTE — Clinical Note
Dr. Siva Jackson,  Please see note from this patient's audiogram.  Please let me know if there is anything further you need.    Dwaine Rinne [address] [facility]

## 2023-03-22 NOTE — PATIENT INSTRUCTIONS
Good Communication Strategies    Communication can be challenging for anyone, but can be especially difficult for those with some degree of hearing loss. While we may not be able to control every factor that may lead to difficulty with communication, there are Good Communication Strategies that we can all use in our day-to-day lives. Communication takes both parties working together for it to be successful. Tips as a Listener:   Control your environment. It is important to limit the amount of background noise in the room when possible. You should also consider having a good light source in the room to best see the other person. Ask for clarification. Instead of saying \"What?\", you can use parts of what you heard to make a new question. For example, if you heard the word \"Thursday\" but not the rest of the week, you may ask \"What was that about Thursday? \" or \"What did you want to do Thursday? \". This shows the person talking that you are listening and will help them better explain what they are saying. Be an advocate for yourself. If you are hearing but not understanding, tell the other person \"I can hear you, but I need you to slow down when you speak. \"  Or if someone is facing the other direction, say \"I cannot hear you when you are not looking at me when we talk. \"       Tips as a Talker:   - Sit or stand 3 to 6 feet away to maximize audibility         -- It is unrealistic to believe someone else will fully hear your message if you are speaking from across the room or in a different room in the house   - Stay at eye level to help with visual cues   - Make sure you have the persons attention before speaking   - Use facial expressions and gestures to accentuate your message   - Raise your voice slightly (do not scream)   - Speak slowly and distinctly   - Use short, simple sentences   - Rephrase your words if the person is having a hard time understanding you    - To avoid distortion, dont speak directly

## 2023-03-28 ENCOUNTER — OFFICE VISIT (OUTPATIENT)
Dept: ENT CLINIC | Age: 38
End: 2023-03-28
Payer: COMMERCIAL

## 2023-03-28 VITALS
BODY MASS INDEX: 34.82 KG/M2 | WEIGHT: 209 LBS | DIASTOLIC BLOOD PRESSURE: 87 MMHG | OXYGEN SATURATION: 98 % | TEMPERATURE: 85 F | HEART RATE: 80 BPM | SYSTOLIC BLOOD PRESSURE: 132 MMHG | HEIGHT: 65 IN

## 2023-03-28 DIAGNOSIS — H90.42 SENSORINEURAL HEARING LOSS (SNHL) OF LEFT EAR WITH UNRESTRICTED HEARING OF RIGHT EAR: ICD-10-CM

## 2023-03-28 DIAGNOSIS — H91.20 SUDDEN-ONSET SENSORINEURAL HEARING LOSS: Primary | ICD-10-CM

## 2023-03-28 PROCEDURE — 69801 INCISE INNER EAR: CPT | Performed by: OTOLARYNGOLOGY

## 2023-03-28 PROCEDURE — 96372 THER/PROPH/DIAG INJ SC/IM: CPT | Performed by: OTOLARYNGOLOGY

## 2023-03-28 RX ORDER — DEXAMETHASONE SODIUM PHOSPHATE 10 MG/ML
10 INJECTION INTRAMUSCULAR; INTRAVENOUS ONCE
Status: COMPLETED | OUTPATIENT
Start: 2023-03-28 | End: 2023-03-28

## 2023-03-28 RX ADMIN — DEXAMETHASONE SODIUM PHOSPHATE 10 MG: 10 INJECTION INTRAMUSCULAR; INTRAVENOUS at 10:42

## 2023-03-28 NOTE — PROGRESS NOTES
provider  [ ] Discussed case with another provider  [ ] High risk of loss of major body function  [ ] Elective major surgery with risk factors    Portions of this note were dictated using Dragon.  There may be linguistic errors secondary to the use of this program.

## 2023-04-02 DIAGNOSIS — Z78.9 USES BIRTH CONTROL: ICD-10-CM

## 2023-04-03 RX ORDER — NORGESTIMATE AND ETHINYL ESTRADIOL 0.25-0.035
KIT ORAL
Qty: 84 TABLET | Refills: 3 | Status: SHIPPED | OUTPATIENT
Start: 2023-04-03

## 2023-04-04 ENCOUNTER — PROCEDURE VISIT (OUTPATIENT)
Dept: AUDIOLOGY | Age: 38
End: 2023-04-04
Payer: COMMERCIAL

## 2023-04-04 ENCOUNTER — OFFICE VISIT (OUTPATIENT)
Dept: ENT CLINIC | Age: 38
End: 2023-04-04
Payer: COMMERCIAL

## 2023-04-04 VITALS
WEIGHT: 206 LBS | DIASTOLIC BLOOD PRESSURE: 86 MMHG | BODY MASS INDEX: 34.32 KG/M2 | TEMPERATURE: 97.5 F | SYSTOLIC BLOOD PRESSURE: 132 MMHG | HEIGHT: 65 IN | HEART RATE: 76 BPM | OXYGEN SATURATION: 98 %

## 2023-04-04 DIAGNOSIS — H90.42 SENSORINEURAL HEARING LOSS (SNHL) OF LEFT EAR WITH UNRESTRICTED HEARING OF RIGHT EAR: ICD-10-CM

## 2023-04-04 DIAGNOSIS — H90.42 SENSORINEURAL HEARING LOSS (SNHL) OF LEFT EAR WITH UNRESTRICTED HEARING OF RIGHT EAR: Primary | ICD-10-CM

## 2023-04-04 DIAGNOSIS — H91.8X9 ASYMMETRICAL HEARING LOSS: ICD-10-CM

## 2023-04-04 DIAGNOSIS — H91.20 SUDDEN-ONSET SENSORINEURAL HEARING LOSS: Primary | ICD-10-CM

## 2023-04-04 DIAGNOSIS — H93.8X2 EAR PRESSURE, LEFT: ICD-10-CM

## 2023-04-04 DIAGNOSIS — H93.12 TINNITUS, LEFT EAR: ICD-10-CM

## 2023-04-04 DIAGNOSIS — R42 DIZZINESS AND GIDDINESS: ICD-10-CM

## 2023-04-04 PROCEDURE — 92557 COMPREHENSIVE HEARING TEST: CPT | Performed by: AUDIOLOGIST

## 2023-04-04 PROCEDURE — 69801 INCISE INNER EAR: CPT | Performed by: OTOLARYNGOLOGY

## 2023-04-04 RX ORDER — DEXAMETHASONE SODIUM PHOSPHATE 10 MG/ML
10 INJECTION INTRAMUSCULAR; INTRAVENOUS ONCE
Status: COMPLETED | OUTPATIENT
Start: 2023-04-04 | End: 2023-04-04

## 2023-04-04 RX ADMIN — DEXAMETHASONE SODIUM PHOSPHATE 10 MG: 10 INJECTION INTRAMUSCULAR; INTRAVENOUS at 15:55

## 2023-04-04 NOTE — PROGRESS NOTES
Marquise Pepe   1985, 41 y.o. female   [id number]       Referring Provider: Sherren Blender, DO  Referral Type: In an order in [address] [place] [address]    Reason for Visit: Evaluation of suspected change in hearing, tinnitus, or balance. ADULT AUDIOLOGIC EVALUATION      Marquise Pepe is a 41 y.o. female seen today, 4/4/2023, for a recheck audiologic evaluation. AUDIOLOGIC AND OTHER PERTINENT MEDICAL HISTORY:        Marquise Pepe noted known left ear sudden sensorineural hearing loss, has completed two transtympanic steroid injections with Dr. Golden Tanner, feels her hearing may be a bit better at this time, she feels her ear can \"open\" with improved hearing at times; known left ear tinnitus and fullness, as well as dizziness. .      Marquise Pepe denied otalgia and otorrhea. IMPRESSIONS:       Today's results are consistent with left ear sensorineural hearing loss, slight improvement noted in low to mid-frequencies with slight improvement in word recognition; right ear remains stable and within normal limits. Hearing loss is significant enough to result in difficulty understanding speech in most listening environments. Follow medical recommendations from Dr. Golden Tanner. ASSESSMENT AND FINDINGS:       Otoscopy revealed: Clear ear canals bilaterally      RIGHT EAR:  Hearing Sensitivity: Within normal limits. Speech Recognition Threshold: Did not test.  Word Recognition: Excellent (100%), based on NU-6 25-word list at 45 dBHL using recorded speech stimuli. COMPARISON TO PREVIOUS TESTING COMPLETED on 3/22/2023 at [facility] ENT: Stable hearing sensitivity and word recognition. LEFT EAR:  Hearing Sensitivity: Mild through 1000 Hz precipitously sloping to moderately-severe sensorineural hearing loss. Speech Recognition Threshold: Did not test.  Word Recognition: Good (88%), based on NU-6 25-word list at 85 dBHL, masked, using recorded speech stimuli.       COMPARISON TO PREVIOUS TESTING COMPLETED on 3/22/2023 at [facility]

## 2023-04-04 NOTE — Clinical Note
Dr. Jordan Burroughs,  Please see note from this patient's audiogram.  Please let me know if there is anything further you need.    Cecily Johnson [address] [place] Audiologist

## 2023-04-04 NOTE — PATIENT INSTRUCTIONS
sounds it is missing. If you are beginning your process with hearing aid(s), schedule a \"Hearing Aid Evaluation\" with an audiologist to discuss your lifestyle, features of hearing aid technology, and styles of hearing aids available. It is recommended that you contact your insurance company to determine if you have a hearing aid benefit, as this may dictate who you can see for these services. Have hearing tests as your doctor suggests. They can show whether your hearing has changed. Your hearing aid may need to be adjusted. Use other assistive devices as needed. These may include:  Telephone amplifiers and hearing aids that can connect to a television, stereo, radio, or microphone. Devices that use lights or vibrations. These alert you to the doorbell, a ringing telephone, or a baby monitor. Television closed-captioning. This shows the words at the bottom of the screen. Most new TVs can do this. TTY (text telephone). This lets you type messages back and forth on the telephone instead of talking or listening. These devices are also called TDD. When messages are typed on the keyboard, they are sent over the phone line to a receiving TTY. The message is shown on a monitor. Use pagers, fax machines, text, and email if it is hard for you to communicate by telephone. Try to learn a listening technique called speech-reading. It is not lip-reading. You pay attention to people's gestures, expressions, posture, and tone of voice. These clues can help you understand what a person is saying. Face the person you are talking to, and have him or her face you. Make sure the lighting is good. You need to see the other person's face clearly. Think about counseling if you need help to adjust to your hearing loss. When should you call for help? Watch closely for changes in your health, and be sure to contact your doctor if:    You think your hearing is getting worse.      You have new symptoms, such as dizziness or

## 2023-04-04 NOTE — PROGRESS NOTES
tablet Take 1 tablet by mouth daily 30 tablet 3    meloxicam (MOBIC) 15 MG tablet Take 1 tablet by mouth at bedtime 30 tablet 3    amLODIPine (NORVASC) 5 MG tablet Take 1 tablet by mouth at bedtime 90 tablet 3    sertraline (ZOLOFT) 100 MG tablet TAKE 1 TABLET BY MOUTH EVERY DAY 90 tablet 0    triamterene-hydroCHLOROthiazide (DYAZIDE) 37.5-25 MG per capsule TAKE 1 CAPSULE BY MOUTH EVERY DAY 90 capsule 0    albuterol sulfate  (90 Base) MCG/ACT inhaler INHALE 2 PUFFS BY MOUTH EVERY SIX HOURS AS NEEDED FOR WHEEZING 8.5 g 0    SUMAtriptan (IMITREX) 50 MG tablet Take 1 tablet by mouth once as needed for Migraine 27 tablet 1     No current facility-administered medications for this visit. Review of Systems     Review of Systems      PhysicalExam     Vitals:    04/04/23 1437   BP: 132/86   Pulse:    Temp:    SpO2:        Physical Exam      Procedure     Transtympanic Dexamethasone Injection    Preop: sudden sensorineural hearing loss  Surgeon: Radha Soto DO  Consent: written obtained  Anes: topical phenol  Description: The patient was placed in a supine position. The left ear was examined under otomicroscopy. Topical phenol was applied to the superior aspect of the left tympanic membrane. Then, 1cc of 10mg/1ml of warmed Dexamethasone was injected into the middle ear using a 27 gauge needle. The patient was instructed to remain in the supine position without swallowing for 5 minutes. The patient tolerated the procedure well. There were no complications with the procedure. Assessment and Plan     1. Sudden-onset sensorineural hearing loss  Audiogram performed today reveals some mild improvement in the low to mid frequencies in the left ear as well as some improvement in word recognition scores. Patient elects for repeat injection. Injection performed out difficulty. Follow-up in 3 months with audiogram.  She will contact me regarding her sinus infection and symptom improvement.   Potentially will

## 2023-05-03 ENCOUNTER — OFFICE VISIT (OUTPATIENT)
Dept: ENT CLINIC | Age: 38
End: 2023-05-03
Payer: COMMERCIAL

## 2023-05-03 VITALS
SYSTOLIC BLOOD PRESSURE: 125 MMHG | OXYGEN SATURATION: 98 % | HEIGHT: 65 IN | DIASTOLIC BLOOD PRESSURE: 86 MMHG | BODY MASS INDEX: 34.82 KG/M2 | TEMPERATURE: 97.7 F | HEART RATE: 84 BPM | WEIGHT: 209 LBS

## 2023-05-03 DIAGNOSIS — J34.2 DNS (DEVIATED NASAL SEPTUM): ICD-10-CM

## 2023-05-03 DIAGNOSIS — J34.3 NASAL TURBINATE HYPERTROPHY: ICD-10-CM

## 2023-05-03 DIAGNOSIS — J30.1 SEASONAL ALLERGIC RHINITIS DUE TO POLLEN: ICD-10-CM

## 2023-05-03 DIAGNOSIS — J32.0 CHRONIC MAXILLARY SINUSITIS: Primary | ICD-10-CM

## 2023-05-03 DIAGNOSIS — J34.89 NASAL OBSTRUCTION: ICD-10-CM

## 2023-05-03 PROCEDURE — 99213 OFFICE O/P EST LOW 20 MIN: CPT | Performed by: OTOLARYNGOLOGY

## 2023-05-03 PROCEDURE — 1036F TOBACCO NON-USER: CPT | Performed by: OTOLARYNGOLOGY

## 2023-05-03 PROCEDURE — G8417 CALC BMI ABV UP PARAM F/U: HCPCS | Performed by: OTOLARYNGOLOGY

## 2023-05-03 PROCEDURE — G8427 DOCREV CUR MEDS BY ELIG CLIN: HCPCS | Performed by: OTOLARYNGOLOGY

## 2023-05-03 PROCEDURE — 31231 NASAL ENDOSCOPY DX: CPT | Performed by: OTOLARYNGOLOGY

## 2023-05-03 RX ORDER — FLUTICASONE PROPIONATE 50 MCG
2 SPRAY, SUSPENSION (ML) NASAL DAILY
Qty: 16 G | Refills: 5 | Status: SHIPPED | OUTPATIENT
Start: 2023-05-03 | End: 2023-06-02

## 2023-05-03 ASSESSMENT — ENCOUNTER SYMPTOMS
STRIDOR: 0
TROUBLE SWALLOWING: 0
FACIAL SWELLING: 0
COUGH: 0
SHORTNESS OF BREATH: 0
VOICE CHANGE: 0
SINUS PAIN: 0
EYE ITCHING: 0
DIARRHEA: 0
RHINORRHEA: 1
CHOKING: 0
COLOR CHANGE: 0
PHOTOPHOBIA: 0
NAUSEA: 0
SORE THROAT: 0
EYE PAIN: 0
EYE REDNESS: 0
SINUS PRESSURE: 1

## 2023-05-03 NOTE — PROGRESS NOTES
[facility]  [address] [address] [facility], [address] [place] [address], [address] [place] [address] [place]  P: [phone]  F: [phone]       Patient     Caden Ash  1985    ChiefComplaint     Chief Complaint   Patient presents with    Follow-up     Patient is here today to follow up on a sinus infection, she states that it is mostly gone but there are some lingering side effects       History of Present Illness     Caden Ash is a pleasant 41 y.o. female here for follow-up for chronic sinusitis. She completed her antibiotic. Symptoms of pressure, congestion and drainage did improve, however she is still having sneezing, thick yellow drainage and some mild pressure on the left side. No issues on the right. She has used nasal saline in the past without any significant relief. She felt like it made symptoms worse. She has not been using nasal steroids.     Past Medical History     Past Medical History:   Diagnosis Date    Asthma     Chicken pox     Degenerative disc disease, lumbar     Guillain Barré syndrome ([facility] [address] 8/4/2016    Hyperlipidemia     Kidney disease     Meniere's disease     Seizure ([facility] [address]     none since 2013    Trimalleolar fracture of ankle, closed, right, initial encounter 2/27/2018    Viral hepatitis        Past Surgical History     Past Surgical History:   Procedure Laterality Date    ANKLE FRACTURE SURGERY Right 03/02/2018    orif trimalleolar -rods/pins    CHOLECYSTECTOMY, LAPAROSCOPIC N/A 12/13/2022    ROBOTIC CHOLECYSTECTOMY performed by Jin Kelly DO at [address] [facility]         Family History     Family History   Problem Relation Age of Onset    High Cholesterol Father     Diabetes Paternal Grandmother     High Cholesterol Paternal Grandmother     Diabetes Paternal Grandfather     High Cholesterol Paternal Grandfather     Cancer Paternal Grandfather         colon    Arthritis Mother     Depression Mother     Depression Sister

## 2023-06-05 DIAGNOSIS — M25.511 ACUTE PAIN OF RIGHT SHOULDER: ICD-10-CM

## 2023-06-05 RX ORDER — MELATONIN
Qty: 90 TABLET | Refills: 0 | Status: SHIPPED | OUTPATIENT
Start: 2023-06-05

## 2023-06-05 RX ORDER — MELOXICAM 15 MG/1
15 TABLET ORAL NIGHTLY
Qty: 30 TABLET | Refills: 0 | Status: SHIPPED | OUTPATIENT
Start: 2023-06-05

## 2023-06-05 NOTE — TELEPHONE ENCOUNTER
Medication:   Requested Prescriptions     Pending Prescriptions Disp Refills    meloxicam (MOBIC) 15 MG tablet [Pharmacy Med Name: Meloxicam Oral Tablet 15 MG] 30 tablet 0     Sig: TAKE 1 TABLET BY MOUTH AT BEDTIME    vitamin D3 (CHOLECALCIFEROL) 25 MCG (1000 UT) TABS tablet [Pharmacy Med Name: Vitamin D3 Oral Tablet 25 MCG (1000 UT)] 90 tablet 0     Sig: TAKE 1 TABLET BY MOUTH EVERY DAY        Last Filled:  2/6/23, 3/1/23    Patient Phone Number: [phone] (home)     Last appt: 2/20/2023   Next appt: Visit date not found  Return in about 5 days (around 2/25/2023). Last OARRS:   RX Monitoring 10/9/2017   Attestation The Prescription Monitoring Report for this patient was reviewed today. Periodic Controlled Substance Monitoring No signs of potential drug abuse or diversion identified.

## 2023-06-14 DIAGNOSIS — I10 ESSENTIAL HYPERTENSION: ICD-10-CM

## 2023-06-14 RX ORDER — METOPROLOL SUCCINATE 25 MG/1
25 TABLET, EXTENDED RELEASE ORAL DAILY
Qty: 30 TABLET | Refills: 0 | OUTPATIENT
Start: 2023-06-14

## 2023-07-08 DIAGNOSIS — F41.1 GAD (GENERALIZED ANXIETY DISORDER): ICD-10-CM

## 2023-07-08 DIAGNOSIS — I10 ESSENTIAL HYPERTENSION: ICD-10-CM

## 2023-07-08 DIAGNOSIS — F33.1 MAJOR DEPRESSIVE DISORDER, RECURRENT, MODERATE (HCC): ICD-10-CM

## 2023-07-08 DIAGNOSIS — M25.511 ACUTE PAIN OF RIGHT SHOULDER: ICD-10-CM

## 2023-07-10 DIAGNOSIS — I10 ESSENTIAL HYPERTENSION: ICD-10-CM

## 2023-07-10 DIAGNOSIS — M25.511 ACUTE PAIN OF RIGHT SHOULDER: ICD-10-CM

## 2023-07-10 RX ORDER — SERTRALINE HYDROCHLORIDE 100 MG/1
TABLET, FILM COATED ORAL
Qty: 90 TABLET | Refills: 0 | Status: SHIPPED | OUTPATIENT
Start: 2023-07-10

## 2023-07-10 RX ORDER — TRIAMTERENE AND HYDROCHLOROTHIAZIDE 37.5; 25 MG/1; MG/1
CAPSULE ORAL
Qty: 90 CAPSULE | Refills: 0 | Status: SHIPPED | OUTPATIENT
Start: 2023-07-10

## 2023-07-10 RX ORDER — MELOXICAM 15 MG/1
15 TABLET ORAL NIGHTLY
Qty: 30 TABLET | Refills: 0 | Status: SHIPPED | OUTPATIENT
Start: 2023-07-10

## 2023-07-10 RX ORDER — METOPROLOL SUCCINATE 25 MG/1
TABLET, EXTENDED RELEASE ORAL
Qty: 30 TABLET | Refills: 0 | Status: SHIPPED | OUTPATIENT
Start: 2023-07-10

## 2023-07-10 RX ORDER — MELATONIN
Qty: 90 TABLET | Refills: 0 | Status: SHIPPED | OUTPATIENT
Start: 2023-07-10

## 2023-07-10 RX ORDER — TRIAMTERENE AND HYDROCHLOROTHIAZIDE 37.5; 25 MG/1; MG/1
CAPSULE ORAL
Qty: 90 CAPSULE | Refills: 0 | Status: SHIPPED | OUTPATIENT
Start: 2023-07-10 | End: 2023-07-10

## 2023-07-10 RX ORDER — TRIAMTERENE AND HYDROCHLOROTHIAZIDE 37.5; 25 MG/1; MG/1
CAPSULE ORAL
Qty: 90 CAPSULE | Refills: 0 | OUTPATIENT
Start: 2023-07-10

## 2023-07-10 RX ORDER — MELOXICAM 15 MG/1
15 TABLET ORAL NIGHTLY
Qty: 30 TABLET | Refills: 0 | OUTPATIENT
Start: 2023-07-10

## 2023-07-10 NOTE — TELEPHONE ENCOUNTER
Medication:   Requested Prescriptions     Pending Prescriptions Disp Refills    meloxicam (MOBIC) 15 MG tablet [Pharmacy Med Name: Meloxicam Oral Tablet 15 MG] 30 tablet 0     Sig: TAKE 1 TABLET BY MOUTH AT BEDTIME    metoprolol succinate (TOPROL XL) 25 MG extended release tablet [Pharmacy Med Name: Metoprolol Succinate ER Oral Tablet Extended Release 24 Hour 25 MG] 30 tablet 0     Sig: TAKE 1 TABLET BY MOUTH EVERY DAY    vitamin D3 (CHOLECALCIFEROL) 25 MCG (1000 UT) TABS tablet [Pharmacy Med Name: Vitamin D3 Oral Tablet 25 MCG (1000 UT)] 90 tablet 0     Sig: TAKE 1 TABLET BY MOUTH EVERY DAY        Last Filled:  6/5/23 6/14/23    Last appt: 2/20/2023   Next appt: 7/8/2023    Last OARRS:   RX Monitoring 10/9/2017   Attestation The Prescription Monitoring Report for this patient was reviewed today. Periodic Controlled Substance Monitoring No signs of potential drug abuse or diversion identified.

## 2023-07-10 NOTE — TELEPHONE ENCOUNTER
Medication:   Requested Prescriptions     Pending Prescriptions Disp Refills    triamterene-hydroCHLOROthiazide (DYAZIDE) 37.5-25 MG per capsule [Pharmacy Med Name: Triamterene-HCTZ Oral Capsule 37.5-25 MG] 90 capsule 0     Sig: TAKE 1 CAPSULE BY MOUTH EVERY DAY        Last Filled:  07/10/23    Patient Phone Number: [phone] (home)     Last appt: 2/20/2023 Return in about 5 days (around 2/25/2023). Next appt: Visit date not found    Last OARRS:   RX Monitoring 10/9/2017   Attestation The Prescription Monitoring Report for this patient was reviewed today. Periodic Controlled Substance Monitoring No signs of potential drug abuse or diversion identified.

## 2023-07-10 NOTE — TELEPHONE ENCOUNTER
Medication:   Requested Prescriptions     Pending Prescriptions Disp Refills    triamterene-hydroCHLOROthiazide (DYAZIDE) 37.5-25 MG per capsule [Pharmacy Med Name: Triamterene-HCTZ Oral Capsule 37.5-25 MG] 90 capsule 0     Sig: TAKE 1 CAPSULE BY MOUTH EVERY DAY        Last Filled:      Patient Phone Number: [phone] (home)     Last appt: 2/20/2023   Next appt: Visit date not found    Last OARRS:   RX Monitoring 10/9/2017   Attestation The Prescription Monitoring Report for this patient was reviewed today. Periodic Controlled Substance Monitoring No signs of potential drug abuse or diversion identified.

## 2023-07-10 NOTE — TELEPHONE ENCOUNTER
Medication:   Requested Prescriptions     Pending Prescriptions Disp Refills    triamterene-hydroCHLOROthiazide (DYAZIDE) 37.5-25 MG per capsule [Pharmacy Med Name: Triamterene-HCTZ Oral Capsule 37.5-25 MG] 90 capsule 0     Sig: TAKE 1 CAPSULE BY MOUTH EVERY DAY    sertraline (ZOLOFT) 100 MG tablet [Pharmacy Med Name: Sertraline HCl Oral Tablet 100 MG] 90 tablet 0     Sig: TAKE 1 TABLET BY MOUTH EVERY DAY        Last Filled:  4/11/23    Last appt: 2/20/2023   Next appt: Visit date not found    Last OARRS:   RX Monitoring 10/9/2017   Attestation The Prescription Monitoring Report for this patient was reviewed today. Periodic Controlled Substance Monitoring No signs of potential drug abuse or diversion identified.

## 2023-07-10 NOTE — TELEPHONE ENCOUNTER
Medication:   Requested Prescriptions     Pending Prescriptions Disp Refills    meloxicam (MOBIC) 15 MG tablet [Pharmacy Med Name: Meloxicam Oral Tablet 15 MG] 30 tablet 0     Sig: TAKE 1 TABLET BY MOUTH AT BEDTIME        Last Filled:      Patient Phone Number: [phone] (home)     Last appt: 2/20/2023   Next appt: 7/10/2023    Last OARRS:   RX Monitoring 10/9/2017   Attestation The Prescription Monitoring Report for this patient was reviewed today. Periodic Controlled Substance Monitoring No signs of potential drug abuse or diversion identified.

## 2023-07-10 NOTE — TELEPHONE ENCOUNTER
Medication:   Requested Prescriptions     Pending Prescriptions Disp Refills    meloxicam (MOBIC) 15 MG tablet [Pharmacy Med Name: Meloxicam Oral Tablet 15 MG] 30 tablet 0     Sig: TAKE 1 TABLET BY MOUTH AT BEDTIME        Last Filled:      Patient Phone Number: [phone] (home)     Last appt: 2/20/2023   Next appt: 7/10/2023    Last OARRS:   RX Monitoring 10/9/2017   Attestation The Prescription Monitoring Report for this patient was reviewed today. Periodic Controlled Substance Monitoring No signs of potential drug abuse or diversion identified.

## 2023-08-03 ENCOUNTER — OFFICE VISIT (OUTPATIENT)
Dept: ORTHOPEDIC SURGERY | Age: 38
End: 2023-08-03

## 2023-08-03 VITALS — BODY MASS INDEX: 34.82 KG/M2 | HEIGHT: 65 IN | WEIGHT: 209 LBS

## 2023-08-03 DIAGNOSIS — M65.4 DE QUERVAIN'S TENOSYNOVITIS, LEFT: ICD-10-CM

## 2023-08-03 DIAGNOSIS — M79.642 PAIN OF LEFT HAND: Primary | ICD-10-CM

## 2023-08-04 NOTE — PROGRESS NOTES
Elvin Melendez   1985, 41 y.o. female   [id number]       Referring Provider: Nader Neal DO  Referral Type: In an order in [address] [place] [address]    Reason for Visit: Evaluation of suspected change in hearing, tinnitus, or balance. ADULT AUDIOLOGIC EVALUATION      Elvin Melendez is a 41 y.o. female seen today, 8/11/2023 , for a recheck audiologic evaluation. Patient was seen by Nader Neal DO following today's evaluation. AUDIOLOGIC AND OTHER PERTINENT MEDICAL HISTORY:      Elvin Melendez noted no significant change since last audiogram on 4/4/2023. Elvin Melendez denied otalgia, aural fullness, otorrhea, dizziness, imbalance, history of falls, history of significant noise exposure, history of head trauma, and history of ear surgery. Previous history and results from audiologic evaluation on 4/4/2023: Today's results are consistent with left ear sensorineural hearing loss, slight improvement noted in low to mid-frequencies with slight improvement in word recognition; right ear remains stable and within normal limits. Hearing loss is significant enough to result in difficulty understanding speech in most listening environments. Follow medical recommendations from Dr. Frances Combs. Date: 8/11/2023     IMPRESSIONS:      Test results consistent with stable asymmetrical sensorineural hearing loss, left worse that right. Hearing loss significant enough to create hearing difficulty in some listening situations. Discussed hearing loss, tinnitus, hearing aids, and test results with patient. Patient to follow medical recommendations per Nader eNal DO .    ASSESSMENT AND FINDINGS:     Otoscopy revealed: non-occluding cerumen AD; unremarkable AS    RIGHT EAR:  Hearing Sensitivity: Essentially normal hearing [id number] Hz  Speech Recognition Threshold: 5 dB HL  Word Recognition:Excellent (100%), based on NU-6 25-word list at 50 dBHL using recorded speech stimuli.     Tympanometry: Normal peak pressure and

## 2023-08-10 DIAGNOSIS — I10 ESSENTIAL HYPERTENSION: ICD-10-CM

## 2023-08-10 DIAGNOSIS — M25.511 ACUTE PAIN OF RIGHT SHOULDER: ICD-10-CM

## 2023-08-10 RX ORDER — MELOXICAM 15 MG/1
15 TABLET ORAL NIGHTLY
Qty: 30 TABLET | Refills: 0 | OUTPATIENT
Start: 2023-08-10

## 2023-08-10 RX ORDER — METOPROLOL SUCCINATE 25 MG/1
TABLET, EXTENDED RELEASE ORAL
Qty: 30 TABLET | Refills: 0 | Status: SHIPPED | OUTPATIENT
Start: 2023-08-10

## 2023-08-10 RX ORDER — MELOXICAM 15 MG/1
15 TABLET ORAL NIGHTLY
Qty: 30 TABLET | Refills: 0 | Status: SHIPPED | OUTPATIENT
Start: 2023-08-10

## 2023-08-10 NOTE — TELEPHONE ENCOUNTER
Medication:   Requested Prescriptions     Pending Prescriptions Disp Refills    meloxicam (MOBIC) 15 MG tablet [Pharmacy Med Name: Meloxicam Oral Tablet 15 MG] 30 tablet 0     Sig: TAKE 1 TABLET BY MOUTH AT BEDTIME        Last Filled:  7/10/23    Patient Phone Number: [phone] (home)     Last appt: 2/20/2023   Next appt: 8/17/2023    Last OARRS:   RX Monitoring 10/9/2017   Attestation The Prescription Monitoring Report for this patient was reviewed today. Periodic Controlled Substance Monitoring No signs of potential drug abuse or diversion identified.

## 2023-08-10 NOTE — TELEPHONE ENCOUNTER
Medication:   Requested Prescriptions     Pending Prescriptions Disp Refills    meloxicam (MOBIC) 15 MG tablet [Pharmacy Med Name: Meloxicam Oral Tablet 15 MG] 30 tablet 0     Sig: TAKE 1 TABLET BY MOUTH AT BEDTIME    metoprolol succinate (TOPROL XL) 25 MG extended release tablet [Pharmacy Med Name: Metoprolol Succinate ER Oral Tablet Extended Release 24 Hour 25 MG] 30 tablet 0     Sig: TAKE 1 TABLET BY MOUTH EVERY DAY        Last Filled:  7/10/23    Patient Phone Number: [phone] (home)     Last appt: 2/20/2023   Next appt: 8/10/2023    Last OARRS:   RX Monitoring 10/9/2017   Attestation The Prescription Monitoring Report for this patient was reviewed today. Periodic Controlled Substance Monitoring No signs of potential drug abuse or diversion identified.

## 2023-08-11 ENCOUNTER — OFFICE VISIT (OUTPATIENT)
Dept: ENT CLINIC | Age: 38
End: 2023-08-11

## 2023-08-11 ENCOUNTER — PROCEDURE VISIT (OUTPATIENT)
Dept: AUDIOLOGY | Age: 38
End: 2023-08-11

## 2023-08-11 VITALS
TEMPERATURE: 97.1 F | HEART RATE: 71 BPM | WEIGHT: 216.2 LBS | DIASTOLIC BLOOD PRESSURE: 82 MMHG | HEIGHT: 65 IN | BODY MASS INDEX: 36.02 KG/M2 | SYSTOLIC BLOOD PRESSURE: 133 MMHG

## 2023-08-11 DIAGNOSIS — H91.8X9 ASYMMETRICAL HEARING LOSS: ICD-10-CM

## 2023-08-11 DIAGNOSIS — J32.0 CHRONIC MAXILLARY SINUSITIS: ICD-10-CM

## 2023-08-11 DIAGNOSIS — M25.511 ACUTE PAIN OF RIGHT SHOULDER: ICD-10-CM

## 2023-08-11 DIAGNOSIS — J34.2 DNS (DEVIATED NASAL SEPTUM): ICD-10-CM

## 2023-08-11 DIAGNOSIS — H91.20 SUDDEN-ONSET SENSORINEURAL HEARING LOSS: Primary | ICD-10-CM

## 2023-08-11 DIAGNOSIS — J34.3 NASAL TURBINATE HYPERTROPHY: ICD-10-CM

## 2023-08-11 DIAGNOSIS — H90.42 SENSORINEURAL HEARING LOSS (SNHL) OF LEFT EAR WITH UNRESTRICTED HEARING OF RIGHT EAR: ICD-10-CM

## 2023-08-11 DIAGNOSIS — J34.89 NASAL OBSTRUCTION: ICD-10-CM

## 2023-08-11 DIAGNOSIS — H90.42 SENSORINEURAL HEARING LOSS (SNHL) OF LEFT EAR WITH UNRESTRICTED HEARING OF RIGHT EAR: Primary | ICD-10-CM

## 2023-08-11 DIAGNOSIS — H93.12 TINNITUS OF LEFT EAR: ICD-10-CM

## 2023-08-11 RX ORDER — MELOXICAM 15 MG/1
15 TABLET ORAL NIGHTLY
Qty: 30 TABLET | Refills: 0 | OUTPATIENT
Start: 2023-08-11

## 2023-08-11 ASSESSMENT — ENCOUNTER SYMPTOMS
SINUS PAIN: 0
VOICE CHANGE: 0
COLOR CHANGE: 0
SINUS PRESSURE: 1
TROUBLE SWALLOWING: 0
CHOKING: 0
SHORTNESS OF BREATH: 0
EYE REDNESS: 0
NAUSEA: 0
EYE PAIN: 0
PHOTOPHOBIA: 0
FACIAL SWELLING: 0
SORE THROAT: 0
STRIDOR: 0
EYE ITCHING: 0
DIARRHEA: 0
COUGH: 0
RHINORRHEA: 0

## 2023-08-11 NOTE — PROGRESS NOTES
Psychiatric/Behavioral:  Negative for agitation and confusion. PhysicalExam     Vitals:    08/11/23 1131   BP: 133/82   Pulse: 71   Temp:        Physical Exam  Constitutional:       Appearance: She is well-developed. HENT:      Head: Normocephalic and atraumatic. Jaw: No trismus. Right Ear: Tympanic membrane, ear canal and external ear normal. No drainage. No middle ear effusion. Tympanic membrane is not perforated. Left Ear: Tympanic membrane, ear canal and external ear normal. No drainage. No middle ear effusion. Tympanic membrane is not perforated. Nose: Septal deviation (right) and mucosal edema present. No rhinorrhea. Right Turbinates: Enlarged and swollen. Left Turbinates: Enlarged and swollen. Mouth/Throat:      Dentition: Normal dentition. Pharynx: Uvula midline. No oropharyngeal exudate. Eyes:      General: No scleral icterus. Right eye: No discharge. Left eye: No discharge. Pupils: Pupils are equal, round, and reactive to light. Neck:      Thyroid: No thyromegaly. Trachea: Phonation normal. No tracheal deviation. Pulmonary:      Effort: Pulmonary effort is normal. No respiratory distress. Breath sounds: No stridor. Musculoskeletal:      Cervical back: Neck supple. Lymphadenopathy:      Cervical: No cervical adenopathy. Skin:     General: Skin is warm and dry. Neurological:      Mental Status: She is alert and oriented to person, place, and time. Cranial Nerves: No cranial nerve deficit. Psychiatric:         Behavior: Behavior normal.         Procedure         Assessment and Plan     1. Sudden-onset sensorineural hearing loss  Unfortunately no significant interval improvement after transtympanic dexamethasone. Patient is a good candidate for hearing amplification in the left ear. She is going to set up an appointment for another hearing aid evaluation in the near future.     2. Sensorineural hearing loss

## 2023-08-11 NOTE — TELEPHONE ENCOUNTER
Medication:   Requested Prescriptions     Pending Prescriptions Disp Refills    meloxicam (MOBIC) 15 MG tablet [Pharmacy Med Name: Meloxicam Oral Tablet 15 MG] 30 tablet 0     Sig: TAKE 1 TABLET BY MOUTH AT BEDTIME        Last Filled:  8/10/23    Patient Phone Number: [phone] (home)     Last appt: 2/20/2023   Next appt: 8/17/2023    Last OARRS:   RX Monitoring 10/9/2017   Attestation The Prescription Monitoring Report for this patient was reviewed today. Periodic Controlled Substance Monitoring No signs of potential drug abuse or diversion identified.

## 2023-08-17 ENCOUNTER — OFFICE VISIT (OUTPATIENT)
Dept: [facility] CLINIC | Age: 38
End: 2023-08-17
Payer: COMMERCIAL

## 2023-08-17 VITALS
BODY MASS INDEX: 35.96 KG/M2 | DIASTOLIC BLOOD PRESSURE: 83 MMHG | TEMPERATURE: 97 F | HEIGHT: 65 IN | SYSTOLIC BLOOD PRESSURE: 122 MMHG | WEIGHT: 215.8 LBS

## 2023-08-17 DIAGNOSIS — F33.41 MAJOR DEPRESSIVE DISORDER, RECURRENT, IN PARTIAL REMISSION (HCC): Primary | ICD-10-CM

## 2023-08-17 DIAGNOSIS — F41.1 GAD (GENERALIZED ANXIETY DISORDER): ICD-10-CM

## 2023-08-17 PROBLEM — S82.851S TRIMALLEOLAR FRACTURE OF ANKLE, CLOSED, RIGHT, SEQUELA: Status: RESOLVED | Noted: 2018-02-27 | Resolved: 2023-08-17

## 2023-08-17 PROBLEM — M19.079 ANKLE ARTHRITIS: Status: RESOLVED | Noted: 2019-06-23 | Resolved: 2023-08-17

## 2023-08-17 PROBLEM — M72.2 PLANTAR FASCIITIS, RIGHT: Status: RESOLVED | Noted: 2018-05-24 | Resolved: 2023-08-17

## 2023-08-17 PROBLEM — M65.4 DE QUERVAIN'S TENOSYNOVITIS, LEFT: Status: RESOLVED | Noted: 2023-08-03 | Resolved: 2023-08-17

## 2023-08-17 PROBLEM — T84.84XA PAINFUL ORTHOPAEDIC HARDWARE (HCC): Status: RESOLVED | Noted: 2019-07-02 | Resolved: 2023-08-17

## 2023-08-17 PROBLEM — K81.0 ACUTE CHOLECYSTITIS: Status: RESOLVED | Noted: 2022-12-12 | Resolved: 2023-08-17

## 2023-08-17 PROCEDURE — G8427 DOCREV CUR MEDS BY ELIG CLIN: HCPCS | Performed by: STUDENT IN AN ORGANIZED HEALTH CARE EDUCATION/TRAINING PROGRAM

## 2023-08-17 PROCEDURE — G8417 CALC BMI ABV UP PARAM F/U: HCPCS | Performed by: STUDENT IN AN ORGANIZED HEALTH CARE EDUCATION/TRAINING PROGRAM

## 2023-08-17 PROCEDURE — 99214 OFFICE O/P EST MOD 30 MIN: CPT | Performed by: STUDENT IN AN ORGANIZED HEALTH CARE EDUCATION/TRAINING PROGRAM

## 2023-08-17 PROCEDURE — 96127 BRIEF EMOTIONAL/BEHAV ASSMT: CPT | Performed by: STUDENT IN AN ORGANIZED HEALTH CARE EDUCATION/TRAINING PROGRAM

## 2023-08-17 PROCEDURE — 1036F TOBACCO NON-USER: CPT | Performed by: STUDENT IN AN ORGANIZED HEALTH CARE EDUCATION/TRAINING PROGRAM

## 2023-08-17 RX ORDER — BUSPIRONE HYDROCHLORIDE 10 MG/1
10 TABLET ORAL 2 TIMES DAILY
Qty: 60 TABLET | Refills: 0 | Status: SHIPPED | OUTPATIENT
Start: 2023-08-17 | End: 2023-09-16

## 2023-08-17 ASSESSMENT — PATIENT HEALTH QUESTIONNAIRE - PHQ9
2. FEELING DOWN, DEPRESSED OR HOPELESS: 3
SUM OF ALL RESPONSES TO PHQ QUESTIONS 1-9: 21
SUM OF ALL RESPONSES TO PHQ QUESTIONS 1-9: 21
SUM OF ALL RESPONSES TO PHQ9 QUESTIONS 1 & 2: 6
7. TROUBLE CONCENTRATING ON THINGS, SUCH AS READING THE NEWSPAPER OR WATCHING TELEVISION: 3
4. FEELING TIRED OR HAVING LITTLE ENERGY: 3
8. MOVING OR SPEAKING SO SLOWLY THAT OTHER PEOPLE COULD HAVE NOTICED. OR THE OPPOSITE, BEING SO FIGETY OR RESTLESS THAT YOU HAVE BEEN MOVING AROUND A LOT MORE THAN USUAL: 0
SUM OF ALL RESPONSES TO PHQ QUESTIONS 1-9: 21
6. FEELING BAD ABOUT YOURSELF - OR THAT YOU ARE A FAILURE OR HAVE LET YOURSELF OR YOUR FAMILY DOWN: 2
SUM OF ALL RESPONSES TO PHQ QUESTIONS 1-9: 20
1. LITTLE INTEREST OR PLEASURE IN DOING THINGS: 3
10. IF YOU CHECKED OFF ANY PROBLEMS, HOW DIFFICULT HAVE THESE PROBLEMS MADE IT FOR YOU TO DO YOUR WORK, TAKE CARE OF THINGS AT HOME, OR GET ALONG WITH OTHER PEOPLE: 1
9. THOUGHTS THAT YOU WOULD BE BETTER OFF DEAD, OR OF HURTING YOURSELF: 1
5. POOR APPETITE OR OVEREATING: 3
3. TROUBLE FALLING OR STAYING ASLEEP: 3

## 2023-08-17 ASSESSMENT — ENCOUNTER SYMPTOMS
CHEST TIGHTNESS: 0
NAUSEA: 0
ABDOMINAL PAIN: 0
CONSTIPATION: 0
SHORTNESS OF BREATH: 0

## 2023-08-17 ASSESSMENT — COLUMBIA-SUICIDE SEVERITY RATING SCALE - C-SSRS
1. WITHIN THE PAST MONTH, HAVE YOU WISHED YOU WERE DEAD OR WISHED YOU COULD GO TO SLEEP AND NOT WAKE UP?: NO
6. HAVE YOU EVER DONE ANYTHING, STARTED TO DO ANYTHING, OR PREPARED TO DO ANYTHING TO END YOUR LIFE?: NO
2. HAVE YOU ACTUALLY HAD ANY THOUGHTS OF KILLING YOURSELF?: NO

## 2023-08-20 ENCOUNTER — HOSPITAL ENCOUNTER (OUTPATIENT)
Dept: CT IMAGING | Age: 38
Discharge: HOME | End: 2023-08-20
Attending: OTOLARYNGOLOGY
Payer: COMMERCIAL

## 2023-08-20 DIAGNOSIS — J32.0 CHRONIC MAXILLARY SINUSITIS: ICD-10-CM

## 2023-08-20 PROCEDURE — 70486 CT MAXILLOFACIAL W/O DYE: CPT

## 2023-08-23 ENCOUNTER — OFFICE VISIT (OUTPATIENT)
Dept: ENT CLINIC | Age: 38
End: 2023-08-23
Payer: COMMERCIAL

## 2023-08-23 VITALS
HEIGHT: 65 IN | BODY MASS INDEX: 36.19 KG/M2 | DIASTOLIC BLOOD PRESSURE: 87 MMHG | SYSTOLIC BLOOD PRESSURE: 131 MMHG | WEIGHT: 217.2 LBS | TEMPERATURE: 97.8 F | HEART RATE: 62 BPM

## 2023-08-23 DIAGNOSIS — H61.21 IMPACTED CERUMEN OF RIGHT EAR: ICD-10-CM

## 2023-08-23 DIAGNOSIS — J34.89 NASAL OBSTRUCTION: ICD-10-CM

## 2023-08-23 DIAGNOSIS — J34.3 NASAL TURBINATE HYPERTROPHY: ICD-10-CM

## 2023-08-23 DIAGNOSIS — J32.2 CHRONIC ETHMOIDAL SINUSITIS: ICD-10-CM

## 2023-08-23 DIAGNOSIS — J34.2 DNS (DEVIATED NASAL SEPTUM): ICD-10-CM

## 2023-08-23 DIAGNOSIS — J32.0 CHRONIC MAXILLARY SINUSITIS: Primary | ICD-10-CM

## 2023-08-23 DIAGNOSIS — J34.89 CONCHA BULLOSA: ICD-10-CM

## 2023-08-23 PROCEDURE — 1036F TOBACCO NON-USER: CPT | Performed by: OTOLARYNGOLOGY

## 2023-08-23 PROCEDURE — G8417 CALC BMI ABV UP PARAM F/U: HCPCS | Performed by: OTOLARYNGOLOGY

## 2023-08-23 PROCEDURE — G8428 CUR MEDS NOT DOCUMENT: HCPCS | Performed by: OTOLARYNGOLOGY

## 2023-08-23 PROCEDURE — 99214 OFFICE O/P EST MOD 30 MIN: CPT | Performed by: OTOLARYNGOLOGY

## 2023-08-23 PROCEDURE — 69210 REMOVE IMPACTED EAR WAX UNI: CPT | Performed by: OTOLARYNGOLOGY

## 2023-08-23 ASSESSMENT — ENCOUNTER SYMPTOMS
SORE THROAT: 0
EYE REDNESS: 0
PHOTOPHOBIA: 0
FACIAL SWELLING: 0
NAUSEA: 0
VOICE CHANGE: 0
SHORTNESS OF BREATH: 0
TROUBLE SWALLOWING: 0
SINUS PRESSURE: 1
RHINORRHEA: 0
SINUS PAIN: 0
DIARRHEA: 0
COUGH: 0
EYE PAIN: 0
COLOR CHANGE: 0
CHOKING: 0
STRIDOR: 0
EYE ITCHING: 0

## 2023-08-31 DIAGNOSIS — M25.511 ACUTE PAIN OF RIGHT SHOULDER: ICD-10-CM

## 2023-09-01 RX ORDER — MELOXICAM 15 MG/1
15 TABLET ORAL NIGHTLY
Qty: 30 TABLET | Refills: 0 | Status: SHIPPED | OUTPATIENT
Start: 2023-09-01

## 2023-09-01 NOTE — TELEPHONE ENCOUNTER
Medication:   Requested Prescriptions     Pending Prescriptions Disp Refills    meloxicam (MOBIC) 15 MG tablet [Pharmacy Med Name: Meloxicam Oral Tablet 15 MG] 30 tablet 0     Sig: TAKE 1 TABLET BY MOUTH AT BEDTIME        Last Filled:08/10/23      Patient Phone Number: [phone] (home)     Last appt: 8/17/2023   Next appt: 9/14/2023    Last OARRS:   RX Monitoring 10/9/2017   Attestation The Prescription Monitoring Report for this patient was reviewed today. Periodic Controlled Substance Monitoring No signs of potential drug abuse or diversion identified.

## 2023-09-06 ENCOUNTER — OFFICE VISIT (OUTPATIENT)
Dept: ORTHOPEDIC SURGERY | Age: 38
End: 2023-09-06
Payer: COMMERCIAL

## 2023-09-06 VITALS — BODY MASS INDEX: 36.14 KG/M2 | RESPIRATION RATE: 16 BRPM | HEIGHT: 65 IN

## 2023-09-06 DIAGNOSIS — M65.4 DE QUERVAIN'S DISEASE (RADIAL STYLOID TENOSYNOVITIS): Primary | ICD-10-CM

## 2023-09-06 PROCEDURE — 99244 OFF/OP CNSLTJ NEW/EST MOD 40: CPT | Performed by: ORTHOPAEDIC SURGERY

## 2023-09-06 PROCEDURE — G8428 CUR MEDS NOT DOCUMENT: HCPCS | Performed by: ORTHOPAEDIC SURGERY

## 2023-09-06 PROCEDURE — G8417 CALC BMI ABV UP PARAM F/U: HCPCS | Performed by: ORTHOPAEDIC SURGERY

## 2023-09-06 NOTE — PROGRESS NOTES
dorsiflexion greater than palmar flexion. There is no evidence of gross joint instability bilaterally. Sensation is normal in the Median, Ulnar, and Radial Sensory distribution bilaterally  Vascular examination reveals normal and good capillary refill bilaterally   Swelling is mild along the radial margin of the wrist on the Left, normal on the Right  Muscular strength is clinically appropriate bilaterally. Examination of the left first dorsal extensor compartment of the wrist demonstrates moderate thickening and fullness. There is mild tenderness to palpation over the extensor tendons. There is moderate pain with resisted thumb extension, and Finklestein's maneuver is positive left side. Wrist range of motion is accompanied by mild pain in dorsiflexion greater than palmar flexion. Radiographic Evaluation:  Radiographs, taken From another Physician's Office outside of my practice were Personally Reviewed & Interpreted by myself today (3 views of the left wrist). They demonstrate no evidence of acute fracture, subluxation, or dislocation. There is no degenerative change at the radiocarpal, ulnocarpal, & intracarpal joints. Impression:  Ms. Sukhi Forman has developed DeQuervain's Tendonitis, which is currently exacerbated, and presents requesting further treatment. Plan:  I have had a thorough discussion with Ms. Sukhi Forman regarding the treatment options available for her initially presenting left  DeQuervain's Tenosynovitis, which is causing her significant symptoms and difficulty. I have outlined for Ms. Sukhi Forman the risk, benefits and consequences of the various treatment modalities, including a reasonable expectation for the long term success of each. We have discussed the likelihood that further, more aggressive treatment may be required for her current presenting condition.   Based upon our current discussion and a reasonable understating of the options available to her, Ms.

## 2023-09-10 DIAGNOSIS — I10 ESSENTIAL HYPERTENSION: ICD-10-CM

## 2023-09-11 RX ORDER — METOPROLOL SUCCINATE 25 MG/1
TABLET, EXTENDED RELEASE ORAL
Qty: 30 TABLET | Refills: 5 | Status: SHIPPED | OUTPATIENT
Start: 2023-09-11

## 2023-09-11 NOTE — TELEPHONE ENCOUNTER
Medication:   Requested Prescriptions     Pending Prescriptions Disp Refills    metoprolol succinate (TOPROL XL) 25 MG extended release tablet [Pharmacy Med Name: Metoprolol Succinate ER Oral Tablet Extended Release 24 Hour 25 MG] 30 tablet 0     Sig: TAKE 1 TABLET BY MOUTH EVERY DAY        Last Filled:  08/10/23    Patient Phone Number: [phone] (home)     Last appt: 8/17/2023   Next appt: 9/14/2023    Last OARRS:       10/9/2017     5:34 PM   RX Monitoring   Attestation The Prescription Monitoring Report for this patient was reviewed today. Periodic Controlled Substance Monitoring No signs of potential drug abuse or diversion identified.

## 2023-09-14 ENCOUNTER — OFFICE VISIT (OUTPATIENT)
Dept: [facility] CLINIC | Age: 38
End: 2023-09-14
Payer: COMMERCIAL

## 2023-09-14 VITALS
HEIGHT: 65 IN | TEMPERATURE: 98 F | BODY MASS INDEX: 36.09 KG/M2 | HEART RATE: 73 BPM | SYSTOLIC BLOOD PRESSURE: 139 MMHG | WEIGHT: 216.6 LBS | DIASTOLIC BLOOD PRESSURE: 81 MMHG

## 2023-09-14 DIAGNOSIS — J45.30 MILD PERSISTENT ASTHMA, UNCOMPLICATED: ICD-10-CM

## 2023-09-14 DIAGNOSIS — Z01.818 PREOP EXAMINATION: Primary | ICD-10-CM

## 2023-09-14 DIAGNOSIS — R29.818 SUSPECTED SLEEP APNEA: ICD-10-CM

## 2023-09-14 DIAGNOSIS — J34.3 NASAL TURBINATE HYPERTROPHY: ICD-10-CM

## 2023-09-14 DIAGNOSIS — I10 PRIMARY HYPERTENSION: ICD-10-CM

## 2023-09-14 PROCEDURE — 3079F DIAST BP 80-89 MM HG: CPT | Performed by: STUDENT IN AN ORGANIZED HEALTH CARE EDUCATION/TRAINING PROGRAM

## 2023-09-14 PROCEDURE — G8417 CALC BMI ABV UP PARAM F/U: HCPCS | Performed by: STUDENT IN AN ORGANIZED HEALTH CARE EDUCATION/TRAINING PROGRAM

## 2023-09-14 PROCEDURE — G8427 DOCREV CUR MEDS BY ELIG CLIN: HCPCS | Performed by: STUDENT IN AN ORGANIZED HEALTH CARE EDUCATION/TRAINING PROGRAM

## 2023-09-14 PROCEDURE — 99214 OFFICE O/P EST MOD 30 MIN: CPT | Performed by: STUDENT IN AN ORGANIZED HEALTH CARE EDUCATION/TRAINING PROGRAM

## 2023-09-14 PROCEDURE — 1036F TOBACCO NON-USER: CPT | Performed by: STUDENT IN AN ORGANIZED HEALTH CARE EDUCATION/TRAINING PROGRAM

## 2023-09-14 PROCEDURE — 3075F SYST BP GE 130 - 139MM HG: CPT | Performed by: STUDENT IN AN ORGANIZED HEALTH CARE EDUCATION/TRAINING PROGRAM

## 2023-09-14 RX ORDER — ALBUTEROL SULFATE 90 UG/1
AEROSOL, METERED RESPIRATORY (INHALATION)
Qty: 8.5 G | Refills: 2 | Status: SHIPPED | OUTPATIENT
Start: 2023-09-14

## 2023-09-14 ASSESSMENT — ENCOUNTER SYMPTOMS
DIARRHEA: 0
WHEEZING: 0
NAUSEA: 0
SINUS PRESSURE: 0
SORE THROAT: 0
SHORTNESS OF BREATH: 0
RHINORRHEA: 1
EYE PAIN: 0
VOMITING: 0
COUGH: 0

## 2023-09-14 NOTE — PROGRESS NOTES
Place patient label inside box (if no patient label, complete below)  Name:  :  MR#:     Wil Lanes [address] (we)Feroz authorize [address] [place] [address],  and/or such assistants as may be selected by him/her, to perform the following operation/procedure(s): EXCISION KENIA BULLOSA LEFT, BILATERAL INFERIOR TURBINATE REDUCTION, BILATERAL MAXILLARY ANTROSTOMY, BILATERAL TOTAL ETHMOIDECTOMY, AND SEPTOPLASTY       Note: If unable to obtain consent prior to an emergent procedure, document the emergent reason in the medical record. This procedure has been explained to my (our) satisfaction and included in the explanation was: The intended benefit, nature, and extent of the procedure to be performed; The significant risks involved and the probability of success; Alternative procedures and methods of treatment; The dangers and probable consequences of such alternatives (including no procedure or treatment); The expected consequences of the procedure on my future health; Whether other qualified individuals would be performing important surgical tasks and/or whether  would be present to advise or support the procedure. I (we) understand that there are other risks of infection and other serious complications in the pre-operative/procedural and postoperative/procedural stages of my (our) care. I (we) have asked all of the questions which I (we) thought were important in deciding whether or not to undergo treatment or diagnosis. These questions have been answered to my (our) satisfaction. I (we) understand that no assurance can be given that the procedure will be a success, and no guarantee or warranty of success has been given to me (us).     It has been explained to me (us) that during the course of the operation/procedure, unforeseen conditions may be revealed that necessitate extension of the original procedure(s) or different procedure(s)

## 2023-09-14 NOTE — PROGRESS NOTES
9/15/2023 0820 AM: SPOKE TO PT. HAVING LABS FROM PCP DONE AT [facility] LAB TODAY/TS    9/15/2023 0808 AM: H&P DONE BY ADRIA HERNANDEZ IN Epic NOTES 9/14/2023-NO EKG REQUIRED PER H&P NOTE-LABS ENTERED FROM PCP AS FUTURE STATUS.  H&P REFERS TO LABS FROM 12/14/2022/TS     9/14/2023 TI COMPLETED/TS    H&P DONE [facility] 9/14 BY ADRIA ELLIS -DB

## 2023-09-14 NOTE — PROGRESS NOTES
[facility] PRE-SURGICAL TESTING INSTRUCTIONS                      PRIOR TO PROCEDURE DATE:    1. PLEASE FOLLOW ANY INSTRUCTIONS GIVEN TO YOU PER YOUR SURGEON. 2. Arrange for someone to drive you home and be with you for the first 24 hours after discharge for your safety after your procedure for which you received sedation. Ensure it is someone we can share information with regarding your discharge. NOTE: At this time ONLY 2 ADULTS may accompany you. NO CHILDREN UNDER AGE OF 16. One person ENCOURAGED to stay at hospital entire time if outpatient surgery      3. You must contact your surgeon for instructions IF:  You are taking any blood thinners, aspirin, anti-inflammatory or vitamins. Contact your ordering physician/surgeon for medication instructions as soon as possible, especially if taking blood thinners, aspirin, heart, or diabetic medication. STOP SUPPLEMENTS/VITAMINS/NON-STEROIDAL ANTI-INFLAMMATORY MEDICATION 7 DAYS PRIOR TO PROCEDURE. There is a change in your physical condition such as a cold, fever, rash, cuts, sores, or any other infection, especially near your surgical site. 4. Do not drink alcohol the day before or day of your procedure. Do not use any recreational marijuana at least 24 hours or street drugs (heroin, cocaine) at minimum 5 days prior to your procedure. 5. A Pre-Surgical History and Physical MUST be completed WITHIN 30 DAYS OR LESS prior to your procedure. by your Physician or an Urgent Care        THE DAY OF YOUR PROCEDURE:  1. Follow instructions for ARRIVAL TIME as DIRECTED BY YOUR SURGEON. 2. Enter the MAIN entrance from [address] and follow the signs to the free Parking Garage or [employer] (offered free of charge 7 am-5pm). 3. Enter the Main Entrance of the hospital (do not enter from the lower level of the parking garage). Upon entrance, check in with the  at the surgical information desk on your LEFT.    Bring your

## 2023-09-15 ENCOUNTER — TELEPHONE (OUTPATIENT)
Dept: ENT CLINIC | Age: 38
End: 2023-09-15

## 2023-09-16 DIAGNOSIS — Z01.818 PREOP EXAMINATION: ICD-10-CM

## 2023-09-16 LAB
ALBUMIN SERPL-MCNC: 4.6 G/DL (ref 3.4–5)
ALBUMIN/GLOB SERPL: 1.8 {RATIO} (ref 1.1–2.2)
ALP SERPL-CCNC: 88 U/L (ref 40–129)
ALT SERPL-CCNC: 10 U/L (ref 10–40)
ANION GAP SERPL CALCULATED.3IONS-SCNC: 14 MMOL/L (ref 3–16)
AST SERPL-CCNC: 12 U/L (ref 15–37)
BASOPHILS # BLD: 0.1 K/UL (ref 0–0.2)
BASOPHILS NFR BLD: 0.6 %
BILIRUB SERPL-MCNC: <0.2 MG/DL (ref 0–1)
BUN SERPL-MCNC: 9 MG/DL (ref 7–20)
CALCIUM SERPL-MCNC: 9.5 MG/DL (ref 8.3–10.6)
CHLORIDE SERPL-SCNC: 98 MMOL/L (ref 99–110)
CHOLEST SERPL-MCNC: 211 MG/DL (ref 0–199)
CO2 SERPL-SCNC: 24 MMOL/L (ref 21–32)
CREAT SERPL-MCNC: 0.6 MG/DL (ref 0.6–1.1)
DEPRECATED RDW RBC AUTO: 13 % (ref 12.4–15.4)
EOSINOPHIL # BLD: 0.4 K/UL (ref 0–0.6)
EOSINOPHIL NFR BLD: 4 %
GFR SERPLBLD CREATININE-BSD FMLA CKD-EPI: >60 ML/MIN/{1.73_M2}
GLUCOSE SERPL-MCNC: 93 MG/DL (ref 70–99)
HCT VFR BLD AUTO: 43.4 % (ref 36–48)
HDLC SERPL-MCNC: 43 MG/DL (ref 40–60)
HGB BLD-MCNC: 14.5 G/DL (ref 12–16)
LDLC SERPL CALC-MCNC: ABNORMAL MG/DL
LDLC SERPL-MCNC: 120 MG/DL
LYMPHOCYTES # BLD: 2.4 K/UL (ref 1–5.1)
LYMPHOCYTES NFR BLD: 24 %
MCH RBC QN AUTO: 28.4 PG (ref 26–34)
MCHC RBC AUTO-ENTMCNC: 33.4 G/DL (ref 31–36)
MCV RBC AUTO: 85.2 FL (ref 80–100)
MONOCYTES # BLD: 0.5 K/UL (ref 0–1.3)
MONOCYTES NFR BLD: 4.9 %
NEUTROPHILS # BLD: 6.5 K/UL (ref 1.7–7.7)
NEUTROPHILS NFR BLD: 66.5 %
PLATELET # BLD AUTO: 438 K/UL (ref 135–450)
PMV BLD AUTO: 8.2 FL (ref 5–10.5)
POTASSIUM SERPL-SCNC: 4.1 MMOL/L (ref 3.5–5.1)
PROT SERPL-MCNC: 7.2 G/DL (ref 6.4–8.2)
RBC # BLD AUTO: 5.1 M/UL (ref 4–5.2)
SODIUM SERPL-SCNC: 136 MMOL/L (ref 136–145)
TRIGL SERPL-MCNC: 311 MG/DL (ref 0–150)
VLDLC SERPL CALC-MCNC: ABNORMAL MG/DL
WBC # BLD AUTO: 9.8 K/UL (ref 4–11)

## 2023-09-17 LAB
EST. AVERAGE GLUCOSE BLD GHB EST-MCNC: 108.3 MG/DL
HBA1C MFR BLD: 5.4 %

## 2023-09-18 ENCOUNTER — HOSPITAL ENCOUNTER (OUTPATIENT)
Age: 38
Setting detail: OUTPATIENT SURGERY
Discharge: HOME | End: 2023-09-18
Attending: OTOLARYNGOLOGY | Admitting: OTOLARYNGOLOGY
Payer: COMMERCIAL

## 2023-09-18 ENCOUNTER — ANESTHESIA EVENT (OUTPATIENT)
Dept: OPERATING ROOM | Age: 38
End: 2023-09-18
Payer: COMMERCIAL

## 2023-09-18 ENCOUNTER — ANESTHESIA (OUTPATIENT)
Dept: OPERATING ROOM | Age: 38
End: 2023-09-18
Payer: COMMERCIAL

## 2023-09-18 VITALS
RESPIRATION RATE: 16 BRPM | HEIGHT: 65 IN | SYSTOLIC BLOOD PRESSURE: 158 MMHG | OXYGEN SATURATION: 94 % | BODY MASS INDEX: 35.79 KG/M2 | TEMPERATURE: 98.3 F | DIASTOLIC BLOOD PRESSURE: 88 MMHG | WEIGHT: 214.8 LBS | HEART RATE: 79 BPM

## 2023-09-18 DIAGNOSIS — J34.3 NASAL TURBINATE HYPERTROPHY: ICD-10-CM

## 2023-09-18 DIAGNOSIS — J32.2 CHRONIC ETHMOIDAL SINUSITIS: ICD-10-CM

## 2023-09-18 DIAGNOSIS — J34.89 NASAL OBSTRUCTION: ICD-10-CM

## 2023-09-18 DIAGNOSIS — J32.0 CHRONIC MAXILLARY SINUSITIS: ICD-10-CM

## 2023-09-18 DIAGNOSIS — J34.89 CONCHA BULLOSA: ICD-10-CM

## 2023-09-18 DIAGNOSIS — G89.18 ACUTE POST-OPERATIVE PAIN: Primary | ICD-10-CM

## 2023-09-18 DIAGNOSIS — J34.2 DEVIATED NASAL SEPTUM: ICD-10-CM

## 2023-09-18 LAB
HCG UR QL: NEGATIVE
HCG UR QL: NEGATIVE

## 2023-09-18 PROCEDURE — 7100000010 HC PHASE II RECOVERY - FIRST 15 MIN: Performed by: OTOLARYNGOLOGY

## 2023-09-18 PROCEDURE — 6360000002 HC RX W HCPCS

## 2023-09-18 PROCEDURE — 3600000014 HC SURGERY LEVEL 4 ADDTL 15MIN: Performed by: OTOLARYNGOLOGY

## 2023-09-18 PROCEDURE — 2500000003 HC RX 250 WO HCPCS: Performed by: ANESTHESIOLOGY

## 2023-09-18 PROCEDURE — 7100000011 HC PHASE II RECOVERY - ADDTL 15 MIN: Performed by: OTOLARYNGOLOGY

## 2023-09-18 PROCEDURE — 2709999900 HC NON-CHARGEABLE SUPPLY: Performed by: OTOLARYNGOLOGY

## 2023-09-18 PROCEDURE — 31256 EXPLORATION MAXILLARY SINUS: CPT | Performed by: OTOLARYNGOLOGY

## 2023-09-18 PROCEDURE — 88304 TISSUE EXAM BY PATHOLOGIST: CPT

## 2023-09-18 PROCEDURE — 31240 NSL/SNS NDSC CNCH BULL RESCJ: CPT | Performed by: OTOLARYNGOLOGY

## 2023-09-18 PROCEDURE — 31255 NSL/SINS NDSC W/TOT ETHMDCT: CPT | Performed by: OTOLARYNGOLOGY

## 2023-09-18 PROCEDURE — 2500000003 HC RX 250 WO HCPCS: Performed by: OTOLARYNGOLOGY

## 2023-09-18 PROCEDURE — 3700000001 HC ADD 15 MINUTES (ANESTHESIA): Performed by: OTOLARYNGOLOGY

## 2023-09-18 PROCEDURE — 6370000000 HC RX 637 (ALT 250 FOR IP): Performed by: OTOLARYNGOLOGY

## 2023-09-18 PROCEDURE — 84703 CHORIONIC GONADOTROPIN ASSAY: CPT

## 2023-09-18 PROCEDURE — 3700000000 HC ANESTHESIA ATTENDED CARE: Performed by: OTOLARYNGOLOGY

## 2023-09-18 PROCEDURE — 6370000000 HC RX 637 (ALT 250 FOR IP): Performed by: ANESTHESIOLOGY

## 2023-09-18 PROCEDURE — 2500000003 HC RX 250 WO HCPCS

## 2023-09-18 PROCEDURE — 2720000010 HC SURG SUPPLY STERILE: Performed by: OTOLARYNGOLOGY

## 2023-09-18 PROCEDURE — 7100000001 HC PACU RECOVERY - ADDTL 15 MIN: Performed by: OTOLARYNGOLOGY

## 2023-09-18 PROCEDURE — A4217 STERILE WATER/SALINE, 500 ML: HCPCS | Performed by: OTOLARYNGOLOGY

## 2023-09-18 PROCEDURE — C9399 UNCLASSIFIED DRUGS OR BIOLOG: HCPCS

## 2023-09-18 PROCEDURE — 30520 REPAIR OF NASAL SEPTUM: CPT | Performed by: OTOLARYNGOLOGY

## 2023-09-18 PROCEDURE — 6360000002 HC RX W HCPCS: Performed by: ANESTHESIOLOGY

## 2023-09-18 PROCEDURE — 30140 RESECT INFERIOR TURBINATE: CPT | Performed by: OTOLARYNGOLOGY

## 2023-09-18 PROCEDURE — 7100000000 HC PACU RECOVERY - FIRST 15 MIN: Performed by: OTOLARYNGOLOGY

## 2023-09-18 PROCEDURE — 2580000003 HC RX 258: Performed by: FAMILY MEDICINE

## 2023-09-18 PROCEDURE — 2580000003 HC RX 258: Performed by: OTOLARYNGOLOGY

## 2023-09-18 PROCEDURE — 3600000004 HC SURGERY LEVEL 4 BASE: Performed by: OTOLARYNGOLOGY

## 2023-09-18 RX ORDER — SODIUM CHLORIDE, SODIUM LACTATE, POTASSIUM CHLORIDE, CALCIUM CHLORIDE 600; 310; 30; 20 MG/100ML; MG/100ML; MG/100ML; MG/100ML
INJECTION, SOLUTION INTRAVENOUS CONTINUOUS
Status: DISCONTINUED | OUTPATIENT
Start: 2023-09-18 | End: 2023-09-18 | Stop reason: HOSPADM

## 2023-09-18 RX ORDER — HYDRALAZINE HYDROCHLORIDE 20 MG/ML
10 INJECTION INTRAMUSCULAR; INTRAVENOUS
Status: DISCONTINUED | OUTPATIENT
Start: 2023-09-18 | End: 2023-09-18 | Stop reason: HOSPADM

## 2023-09-18 RX ORDER — DOXYCYCLINE HYCLATE 100 MG
100 TABLET ORAL DAILY
Qty: 7 TABLET | Refills: 0 | Status: SHIPPED | OUTPATIENT
Start: 2023-09-18 | End: 2023-09-25

## 2023-09-18 RX ORDER — HYDROCODONE BITARTRATE AND ACETAMINOPHEN 5; 325 MG/1; MG/1
1 TABLET ORAL EVERY 6 HOURS PRN
Qty: 28 TABLET | Refills: 0 | Status: SHIPPED | OUTPATIENT
Start: 2023-09-18 | End: 2023-09-25

## 2023-09-18 RX ORDER — METOPROLOL TARTRATE 5 MG/5ML
INJECTION INTRAVENOUS PRN
Status: DISCONTINUED | OUTPATIENT
Start: 2023-09-18 | End: 2023-09-18 | Stop reason: SDUPTHER

## 2023-09-18 RX ORDER — LABETALOL HYDROCHLORIDE 5 MG/ML
10 INJECTION, SOLUTION INTRAVENOUS
Status: DISCONTINUED | OUTPATIENT
Start: 2023-09-18 | End: 2023-09-18 | Stop reason: HOSPADM

## 2023-09-18 RX ORDER — MEPERIDINE HYDROCHLORIDE 25 MG/ML
12.5 INJECTION INTRAMUSCULAR; INTRAVENOUS; SUBCUTANEOUS EVERY 5 MIN PRN
Status: DISCONTINUED | OUTPATIENT
Start: 2023-09-18 | End: 2023-09-18 | Stop reason: HOSPADM

## 2023-09-18 RX ORDER — SUMATRIPTAN 50 MG/1
50 TABLET, FILM COATED ORAL ONCE
Status: COMPLETED | OUTPATIENT
Start: 2023-09-18 | End: 2023-09-18

## 2023-09-18 RX ORDER — SODIUM CHLORIDE 0.9 % (FLUSH) 0.9 %
5-40 SYRINGE (ML) INJECTION PRN
Status: DISCONTINUED | OUTPATIENT
Start: 2023-09-18 | End: 2023-09-18 | Stop reason: HOSPADM

## 2023-09-18 RX ORDER — FENTANYL CITRATE 50 UG/ML
INJECTION, SOLUTION INTRAMUSCULAR; INTRAVENOUS PRN
Status: DISCONTINUED | OUTPATIENT
Start: 2023-09-18 | End: 2023-09-18 | Stop reason: SDUPTHER

## 2023-09-18 RX ORDER — DIPHENHYDRAMINE HYDROCHLORIDE 50 MG/ML
12.5 INJECTION INTRAMUSCULAR; INTRAVENOUS
Status: COMPLETED | OUTPATIENT
Start: 2023-09-18 | End: 2023-09-18

## 2023-09-18 RX ORDER — HYDROMORPHONE HYDROCHLORIDE 1 MG/ML
0.5 INJECTION, SOLUTION INTRAMUSCULAR; INTRAVENOUS; SUBCUTANEOUS EVERY 5 MIN PRN
Status: DISCONTINUED | OUTPATIENT
Start: 2023-09-18 | End: 2023-09-18 | Stop reason: HOSPADM

## 2023-09-18 RX ORDER — HYDROMORPHONE HYDROCHLORIDE 1 MG/ML
0.5 INJECTION, SOLUTION INTRAMUSCULAR; INTRAVENOUS; SUBCUTANEOUS EVERY 10 MIN PRN
Status: DISCONTINUED | OUTPATIENT
Start: 2023-09-18 | End: 2023-09-18 | Stop reason: HOSPADM

## 2023-09-18 RX ORDER — ROCURONIUM BROMIDE 10 MG/ML
INJECTION, SOLUTION INTRAVENOUS PRN
Status: DISCONTINUED | OUTPATIENT
Start: 2023-09-18 | End: 2023-09-18 | Stop reason: SDUPTHER

## 2023-09-18 RX ORDER — MIDAZOLAM HYDROCHLORIDE 1 MG/ML
INJECTION INTRAMUSCULAR; INTRAVENOUS PRN
Status: DISCONTINUED | OUTPATIENT
Start: 2023-09-18 | End: 2023-09-18 | Stop reason: SDUPTHER

## 2023-09-18 RX ORDER — LIDOCAINE HYDROCHLORIDE 20 MG/ML
INJECTION, SOLUTION INTRAVENOUS PRN
Status: DISCONTINUED | OUTPATIENT
Start: 2023-09-18 | End: 2023-09-18 | Stop reason: SDUPTHER

## 2023-09-18 RX ORDER — LIDOCAINE HYDROCHLORIDE AND EPINEPHRINE 10; 10 MG/ML; UG/ML
INJECTION, SOLUTION INFILTRATION; PERINEURAL PRN
Status: DISCONTINUED | OUTPATIENT
Start: 2023-09-18 | End: 2023-09-18 | Stop reason: HOSPADM

## 2023-09-18 RX ORDER — HYDROCODONE BITARTRATE AND ACETAMINOPHEN 5; 325 MG/1; MG/1
1 TABLET ORAL ONCE
Status: COMPLETED | OUTPATIENT
Start: 2023-09-18 | End: 2023-09-18

## 2023-09-18 RX ORDER — MAGNESIUM HYDROXIDE 1200 MG/15ML
LIQUID ORAL CONTINUOUS PRN
Status: DISCONTINUED | OUTPATIENT
Start: 2023-09-18 | End: 2023-09-18 | Stop reason: HOSPADM

## 2023-09-18 RX ORDER — SODIUM CHLORIDE 9 MG/ML
INJECTION, SOLUTION INTRAVENOUS PRN
Status: DISCONTINUED | OUTPATIENT
Start: 2023-09-18 | End: 2023-09-18 | Stop reason: HOSPADM

## 2023-09-18 RX ORDER — METOCLOPRAMIDE HYDROCHLORIDE 5 MG/ML
10 INJECTION INTRAMUSCULAR; INTRAVENOUS
Status: DISCONTINUED | OUTPATIENT
Start: 2023-09-18 | End: 2023-09-18 | Stop reason: HOSPADM

## 2023-09-18 RX ORDER — SODIUM CHLORIDE 0.9 % (FLUSH) 0.9 %
5-40 SYRINGE (ML) INJECTION EVERY 12 HOURS SCHEDULED
Status: DISCONTINUED | OUTPATIENT
Start: 2023-09-18 | End: 2023-09-18 | Stop reason: HOSPADM

## 2023-09-18 RX ORDER — OXYMETAZOLINE HYDROCHLORIDE 0.05 G/100ML
SPRAY NASAL PRN
Status: DISCONTINUED | OUTPATIENT
Start: 2023-09-18 | End: 2023-09-18 | Stop reason: HOSPADM

## 2023-09-18 RX ORDER — PROPOFOL 10 MG/ML
INJECTION, EMULSION INTRAVENOUS PRN
Status: DISCONTINUED | OUTPATIENT
Start: 2023-09-18 | End: 2023-09-18 | Stop reason: SDUPTHER

## 2023-09-18 RX ORDER — ONDANSETRON 2 MG/ML
INJECTION INTRAMUSCULAR; INTRAVENOUS PRN
Status: DISCONTINUED | OUTPATIENT
Start: 2023-09-18 | End: 2023-09-18 | Stop reason: SDUPTHER

## 2023-09-18 RX ADMIN — FENTANYL CITRATE 50 MCG: 50 INJECTION, SOLUTION INTRAMUSCULAR; INTRAVENOUS at 08:17

## 2023-09-18 RX ADMIN — HYDROMORPHONE HYDROCHLORIDE 0.5 MG: 1 INJECTION, SOLUTION INTRAMUSCULAR; INTRAVENOUS; SUBCUTANEOUS at 09:10

## 2023-09-18 RX ADMIN — LIDOCAINE HYDROCHLORIDE 100 MG: 20 INJECTION, SOLUTION INTRAVENOUS at 08:27

## 2023-09-18 RX ADMIN — DIPHENHYDRAMINE HYDROCHLORIDE 12.5 MG: 50 INJECTION INTRAMUSCULAR; INTRAVENOUS at 09:20

## 2023-09-18 RX ADMIN — ACETAMINOPHEN, ASPIRIN, CAFFEINE 1 TABLET: 250; 65; 250 TABLET, FILM COATED ORAL at 09:34

## 2023-09-18 RX ADMIN — MIDAZOLAM HYDROCHLORIDE 2 MG: 2 INJECTION, SOLUTION INTRAMUSCULAR; INTRAVENOUS at 07:28

## 2023-09-18 RX ADMIN — HYDROCODONE BITARTRATE AND ACETAMINOPHEN 1 TABLET: 5; 325 TABLET ORAL at 11:06

## 2023-09-18 RX ADMIN — SUMATRIPTAN SUCCINATE 50 MG: 50 TABLET ORAL at 09:18

## 2023-09-18 RX ADMIN — SUGAMMADEX 200 MG: 100 INJECTION, SOLUTION INTRAVENOUS at 08:27

## 2023-09-18 RX ADMIN — ROCURONIUM BROMIDE 50 MG: 10 INJECTION, SOLUTION INTRAVENOUS at 07:33

## 2023-09-18 RX ADMIN — FENTANYL CITRATE 50 MCG: 50 INJECTION, SOLUTION INTRAMUSCULAR; INTRAVENOUS at 08:37

## 2023-09-18 RX ADMIN — LABETALOL HYDROCHLORIDE 10 MG: 5 INJECTION, SOLUTION INTRAVENOUS at 09:51

## 2023-09-18 RX ADMIN — FENTANYL CITRATE 50 MCG: 50 INJECTION, SOLUTION INTRAMUSCULAR; INTRAVENOUS at 07:33

## 2023-09-18 RX ADMIN — FENTANYL CITRATE 50 MCG: 50 INJECTION, SOLUTION INTRAMUSCULAR; INTRAVENOUS at 07:53

## 2023-09-18 RX ADMIN — LIDOCAINE HYDROCHLORIDE 50 MG: 20 INJECTION, SOLUTION INTRAVENOUS at 07:33

## 2023-09-18 RX ADMIN — SODIUM CHLORIDE, POTASSIUM CHLORIDE, SODIUM LACTATE AND CALCIUM CHLORIDE: 600; 310; 30; 20 INJECTION, SOLUTION INTRAVENOUS at 06:44

## 2023-09-18 RX ADMIN — HYDROMORPHONE HYDROCHLORIDE 0.5 MG: 1 INJECTION, SOLUTION INTRAMUSCULAR; INTRAVENOUS; SUBCUTANEOUS at 09:48

## 2023-09-18 RX ADMIN — METOPROLOL TARTRATE 2.5 MG: 1 INJECTION, SOLUTION INTRAVENOUS at 08:09

## 2023-09-18 RX ADMIN — PROPOFOL 200 MG: 10 INJECTION, EMULSION INTRAVENOUS at 07:33

## 2023-09-18 RX ADMIN — ONDANSETRON 4 MG: 2 INJECTION INTRAMUSCULAR; INTRAVENOUS at 07:46

## 2023-09-18 ASSESSMENT — PAIN SCALES - GENERAL
PAINLEVEL_OUTOF10: 5
PAINLEVEL_OUTOF10: 4
PAINLEVEL_OUTOF10: 8
PAINLEVEL_OUTOF10: 6
PAINLEVEL_OUTOF10: 7
PAINLEVEL_OUTOF10: 5
PAINLEVEL_OUTOF10: 6
PAINLEVEL_OUTOF10: 4

## 2023-09-18 ASSESSMENT — PAIN DESCRIPTION - LOCATION
LOCATION: HEAD
LOCATION: HEAD
LOCATION: NOSE;HEAD
LOCATION: HEAD

## 2023-09-18 ASSESSMENT — PAIN DESCRIPTION - PAIN TYPE
TYPE: ACUTE PAIN
TYPE: SURGICAL PAIN;ACUTE PAIN

## 2023-09-18 ASSESSMENT — PAIN DESCRIPTION - ORIENTATION
ORIENTATION: ANTERIOR
ORIENTATION: ANTERIOR
ORIENTATION: ANTERIOR;POSTERIOR
ORIENTATION: ANTERIOR

## 2023-09-18 ASSESSMENT — PAIN DESCRIPTION - DESCRIPTORS
DESCRIPTORS: ACHING;BURNING
DESCRIPTORS: SHARP

## 2023-09-18 ASSESSMENT — PAIN DESCRIPTION - FREQUENCY
FREQUENCY: CONTINUOUS
FREQUENCY: CONTINUOUS

## 2023-09-18 ASSESSMENT — PAIN - FUNCTIONAL ASSESSMENT
PAIN_FUNCTIONAL_ASSESSMENT: PREVENTS OR INTERFERES SOME ACTIVE ACTIVITIES AND ADLS
PAIN_FUNCTIONAL_ASSESSMENT: 0-10

## 2023-09-18 ASSESSMENT — PAIN DESCRIPTION - ONSET: ONSET: ON-GOING

## 2023-09-18 NOTE — OP NOTE
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]    Post-Op Diagnosis: Same       Procedure(s):  EXCISION KENIA BULLOSA LEFT, BILATERAL INFERIOR TURBINATE REDUCTION, BILATERAL MAXILLARY ANTROSTOMY, BILATERAL TOTAL ETHMOIDECTOMY, AND SEPTOPLASTY    Surgeon(s):  Bertram Dennis DO    Assistant:   * No surgical staff found *    Anesthesia: General    Estimated Blood Loss (mL): less than 50     Complications: None    Specimens:   ID Type Source Tests Collected by Time Destination   A : [facility] Tissue Tissue SURGICAL PATHOLOGY Bertram Dennis DO 9/18/2023 0755    B : Bilateral sinus contents Tissue Tissue SURGICAL PATHOLOGY Bertram Dennis DO 9/18/2023 0831        Implants:  * No implants in log *      Drains: * No LDAs found *    Findings: 4+ septal deviation to the right. Bilateral inferior turbinate hypertrophy. Chronic sinusitis, sinus contents. Adequate hemostasis. Left kenia bullosa. Detailed Description of Procedure:   Patient was identified in preoperative holding area. Verified informed consent was obtained. The patient was taken to the operating suite, transferred to the operating table, sedated with general anesthesia and endotracheally intubated. The bed was rotated counterclockwise 90 degrees. The nose was infiltrated with 1% lidocaine with 1: 100,000 epinephrine and packed with Afrin-soaked cottonoids. The patient was prepped and draped in a standard fashion. A proper timeout was performed. Cottonoids were removed. The nasal passages were examined with the 4 mm 0 degree rigid nasal endoscope.   The uncinate and middle turbinate were infiltrated with local

## 2023-09-18 NOTE — PROGRESS NOTES
Patient is resting in bed with call light. 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 She request staff wears a mask if possible when caring for her. Hx of seizures but has not had one since 2013.

## 2023-09-18 NOTE — PROGRESS NOTES
Pt arrived sleepy restless and fallows commands report from anesthesia the pat was grabbing at face on exit from OR and NRB was removed.  On RA Sp02 91 % EXCISION KENIA BULLOSA LEFT, BILATERAL INFERIOR TURBINATE REDUCTION, BILATERAL MAXILLARY ANTROSTOMY, BILATERAL TOTAL ETHMOIDECTOMY, AND SEPTOPLASTY

## 2023-09-18 NOTE — BRIEF OP NOTE
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]    Post-Op Diagnosis: Same       Procedure(s):  EXCISION KENIA BULLOSA LEFT, BILATERAL INFERIOR TURBINATE REDUCTION, BILATERAL MAXILLARY ANTROSTOMY, BILATERAL TOTAL ETHMOIDECTOMY, AND SEPTOPLASTY    Surgeon(s):  Flori Saba DO    Assistant:  * No surgical staff found *    Anesthesia: General    Estimated Blood Loss (mL): less than 50     Complications: None    Specimens:   ID Type Source Tests Collected by Time Destination   A : [facility] Tissue Tissue SURGICAL PATHOLOGY Flori Saba DO 9/18/2023 0755    B : Bilateral sinus contents Tissue Tissue SURGICAL PATHOLOGY Flori Saba DO 9/18/2023 0831        Implants:  * No implants in log *      Drains: * No LDAs found *        Electronically signed by Flori Saba DO on 9/18/2023 at 8:48 AM

## 2023-09-18 NOTE — DISCHARGE INSTRUCTIONS
[facility]  Disha Born D.O.  [address] [facility]. [address]  [phone]      POST-OP NASAL SURGERY INSTRUCTIONS    Diet  Resume regular diet. Avoid hot and spicy foods, as this may increase nasal blood flow and oozing  Drink plenty of liquids    Activity  DO NOT blow your nose. Cough and sneeze with you mouth open if you need to do so. Avoid Straining, bending or lifting or vigorous exercise for 2 weeks  Keep the head of your bed elevated to reduce swelling for the first 72 hours  May shower the day after surgery    General Instructions  Change the drip pad (gauze) under your nose as needed. In the first 24-48 hours, you may need to change the drip pad every 15-20 minutes. This is normal. Do not be alarmed. If you experience excessive bleeding or bleeding that does not subside after 15 minutes, you should call the number above. You may place ice packs on your nose to alleviate swelling and discomfort. DO NOT place ice packs directly on nose, wrap the pack in a cloth before application. Splints will be placed in your nose, this will cause stuffiness and mild discomfort. They will be removed at your first post-op visit. Medications  Resume your normal medications  Take antibiotics prescribed  Take pain medications as needed for pain  DO NOT use any herbal medicines/diet pills for two weeks after surgery. Saline nasal spray can be purchased over the counter, and should be used 4 times daily to keep the nasal passageways moist.      Call the Office  Fever greater than 100.4  Excessive nasal bleeding. Sudden increase in pain     [facility]    There are potential side effects of anesthesia or sedation you may experience for the first 24 hours. These side effects include:    Confusion or Memory loss, Dizziness, or Delayed Reaction Times   [x]A responsible person should be with you for the next 24 hours.   Do not operate

## 2023-09-18 NOTE — ANESTHESIA POSTPROCEDURE EVALUATION
Department of Anesthesiology  Postprocedure Note    Patient: Stoney Torres  MRN: [id number]  YOB: 1985  Date of evaluation: 9/18/2023      Procedure Summary     Date: 09/18/23 Room / Location: [address] [address] [facility]    Anesthesia Start: 2805 Anesthesia Stop: 8096    Procedure: EXCISION KENIA BULLOSA LEFT, BILATERAL INFERIOR TURBINATE REDUCTION, BILATERAL MAXILLARY ANTROSTOMY, BILATERAL TOTAL ETHMOIDECTOMY, AND SEPTOPLASTY (Bilateral) Diagnosis:       Chronic maxillary sinusitis      Chronic ethmoidal sinusitis      Nasal obstruction      Deviated nasal septum      Nasal turbinate hypertrophy      Kenia bullosa      (Chronic maxillary sinusitis [J32.0])      (Chronic ethmoidal sinusitis [J32.2])      (Nasal obstruction [J34.89])      (Deviated nasal septum [J34.2])      (Nasal turbinate hypertrophy [J34.3])      (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     El Phase I: El Score: 7    El Phase II:        Anesthesia Post Evaluation    Patient location during evaluation: PACU  Patient participation: complete - patient participated  Level of consciousness: awake and alert  Pain score: 5  Airway patency: patent  Nausea & Vomiting: no nausea and no vomiting  Complications: no  Cardiovascular status: hemodynamically stable  Respiratory status: acceptable  Hydration status: euvolemic  Pain management: adequate

## 2023-09-18 NOTE — PROGRESS NOTES
Ambulatory Surgery/Procedure Discharge Note    Vitals:    09/18/23 1106   BP:    Pulse:    Resp: 16   Temp:    SpO2:    /88, HR79,  POX 94% and meets El score criteria    In: 1500 [P.O.:500; I.V.:1000]  Out: -     Restroom use offered before discharge. Yes    Pain assessment:  level of pain (1-10, 10 severe),   Pain Level: 5 \"Satisfied\"    Pt alert and oriented x4. IV removed per protocol. Denies N/V or pain. Mustache bro remains C, D, & I with no through drainage. Pt tolerating po crackers and juice. Discharge instructions given to pt and spouse with pt permission. Pt and spouse verbalized understanding of all instructions. Pt and S.O./family states \"ready to go home\". Left with all belongings and discharge instructions and aware that prescriptions sent to [facility]. Patient discharged to home/self care via wheel chair by transporter to waiting spouse. 9/18/2023 11:38 AM     1106 Shaniko given as ordered after notifying Dr. Judah Torres of [facility] and hx of migraines with orders noted. 1035 Sanjana crabtree C, D, & I with no through bleeding noted. Spouse at bedside and pt tolerating po well.

## 2023-09-18 NOTE — H&P
Interval H&P  See preop H&P from [facility] dated 9/14/23  No interval changes  38F with deviated nasal septum, turbinate hypertrophy, chronic sinusitis  Proceed with surgery

## 2023-09-22 ENCOUNTER — OFFICE VISIT (OUTPATIENT)
Dept: ENT CLINIC | Age: 38
End: 2023-09-22

## 2023-09-22 VITALS — HEIGHT: 65 IN | WEIGHT: 214.2 LBS | BODY MASS INDEX: 35.69 KG/M2

## 2023-09-22 DIAGNOSIS — Z98.890 HISTORY OF ENDOSCOPIC SINUS SURGERY: ICD-10-CM

## 2023-09-22 DIAGNOSIS — Z98.890 HISTORY OF NASAL SEPTOPLASTY: Primary | ICD-10-CM

## 2023-09-22 DIAGNOSIS — Z48.89 POSTOPERATIVE VISIT: ICD-10-CM

## 2023-09-26 ENCOUNTER — OFFICE VISIT (OUTPATIENT)
Dept: GYNECOLOGY | Age: 38
End: 2023-09-26
Payer: COMMERCIAL

## 2023-09-26 VITALS
BODY MASS INDEX: 34.82 KG/M2 | WEIGHT: 209 LBS | HEIGHT: 65 IN | HEART RATE: 70 BPM | SYSTOLIC BLOOD PRESSURE: 110 MMHG | RESPIRATION RATE: 17 BRPM | DIASTOLIC BLOOD PRESSURE: 68 MMHG

## 2023-09-26 DIAGNOSIS — Z78.9 USES BIRTH CONTROL: ICD-10-CM

## 2023-09-26 DIAGNOSIS — Z01.419 WELL WOMAN EXAM WITH ROUTINE GYNECOLOGICAL EXAM: Primary | ICD-10-CM

## 2023-09-26 PROCEDURE — 99395 PREV VISIT EST AGE 18-39: CPT | Performed by: OBSTETRICS & GYNECOLOGY

## 2023-09-26 PROCEDURE — 3074F SYST BP LT 130 MM HG: CPT | Performed by: OBSTETRICS & GYNECOLOGY

## 2023-09-26 PROCEDURE — 3078F DIAST BP <80 MM HG: CPT | Performed by: OBSTETRICS & GYNECOLOGY

## 2023-09-26 RX ORDER — NORGESTIMATE AND ETHINYL ESTRADIOL 0.25-0.035
1 KIT ORAL DAILY
Qty: 84 TABLET | Refills: 3 | Status: SHIPPED | OUTPATIENT
Start: 2023-09-26

## 2023-09-27 ASSESSMENT — ENCOUNTER SYMPTOMS
RESPIRATORY NEGATIVE: 1
ALLERGIC/IMMUNOLOGIC NEGATIVE: 1
EYES NEGATIVE: 1
GASTROINTESTINAL NEGATIVE: 1

## 2023-09-27 NOTE — PROGRESS NOTES
Lower Body: No right inguinal adenopathy. No left inguinal adenopathy. Skin:     General: Skin is warm and dry. Coloration: Skin is not jaundiced or pale. Findings: No bruising, erythema, lesion or rash. Neurological:      General: No focal deficit present. Mental Status: She is alert and oriented to person, place, and time. Mental status is at baseline. Deep Tendon Reflexes: Reflexes are normal and symmetric. Psychiatric:         Mood and Affect: Mood normal.         Behavior: Behavior normal.         Thought Content:  Thought content normal.         Judgment: Judgment normal.         Assessment:      Annual  Birth control      Plan:      Pap, calcium, exercise  Refill Chad Pickett MD

## 2023-10-09 DIAGNOSIS — F33.1 MAJOR DEPRESSIVE DISORDER, RECURRENT, MODERATE (HCC): ICD-10-CM

## 2023-10-09 DIAGNOSIS — F41.1 GAD (GENERALIZED ANXIETY DISORDER): ICD-10-CM

## 2023-10-09 DIAGNOSIS — F33.41 MAJOR DEPRESSIVE DISORDER, RECURRENT, IN PARTIAL REMISSION (HCC): ICD-10-CM

## 2023-10-10 DIAGNOSIS — F41.1 GAD (GENERALIZED ANXIETY DISORDER): ICD-10-CM

## 2023-10-10 DIAGNOSIS — F33.1 MAJOR DEPRESSIVE DISORDER, RECURRENT, MODERATE (HCC): ICD-10-CM

## 2023-10-10 RX ORDER — SERTRALINE HYDROCHLORIDE 100 MG/1
TABLET, FILM COATED ORAL
Qty: 90 TABLET | Refills: 0 | OUTPATIENT
Start: 2023-10-10

## 2023-10-10 RX ORDER — SERTRALINE HYDROCHLORIDE 100 MG/1
TABLET, FILM COATED ORAL
Qty: 30 TABLET | Refills: 2 | Status: SHIPPED | OUTPATIENT
Start: 2023-10-10

## 2023-10-10 NOTE — TELEPHONE ENCOUNTER
Medication:   Requested Prescriptions     Pending Prescriptions Disp Refills    sertraline (ZOLOFT) 100 MG tablet [Pharmacy Med Name: Sertraline HCl Oral Tablet 100 MG] 90 tablet 0     Sig: TAKE 1 TABLET BY MOUTH EVERY DAY        Last Filled:      Patient Phone Number: [phone] (home)     Last appt: 9/14/2023   Next appt: Visit date not found    Last OARRS:       10/9/2017     5:34 PM   RX Monitoring   Attestation The Prescription Monitoring Report for this patient was reviewed today. Periodic Controlled Substance Monitoring No signs of potential drug abuse or diversion identified.

## 2023-10-10 NOTE — TELEPHONE ENCOUNTER
Medication:   Requested Prescriptions     Pending Prescriptions Disp Refills    sertraline (ZOLOFT) 100 MG tablet [Pharmacy Med Name: Sertraline HCl Oral Tablet 100 MG] 90 tablet 0     Sig: TAKE 1 TABLET BY MOUTH EVERY DAY        Last Filled:08/17/23      Patient Phone Number: [phone] (home)     Last appt: 9/14/2023 Return in about 3 months (around 12/14/2023) for mood recheck  Next appt: Visit date not found    Last OARRS:       10/9/2017     5:34 PM   RX Monitoring   Attestation The Prescription Monitoring Report for this patient was reviewed today. Periodic Controlled Substance Monitoring No signs of potential drug abuse or diversion identified.

## 2023-10-10 NOTE — TELEPHONE ENCOUNTER
Medication:   Requested Prescriptions     Pending Prescriptions Disp Refills    sertraline (ZOLOFT) 50 MG tablet [Pharmacy Med Name: Sertraline HCl Oral Tablet 50 MG] 30 tablet 0     Sig: TAKE 1 TABLET BY MOUTH EVERY DAY        Last Filled:  8/17/2023     Patient Phone Number: [phone] (home)     Last appt: 9/14/2023   Next appt: 10/9/2023      Return in about 3 months (around 12/14/2023) for mood recheck.

## 2023-10-11 DIAGNOSIS — M25.511 ACUTE PAIN OF RIGHT SHOULDER: ICD-10-CM

## 2023-10-11 RX ORDER — MELOXICAM 15 MG/1
15 TABLET ORAL NIGHTLY
Qty: 30 TABLET | Refills: 0 | Status: SHIPPED | OUTPATIENT
Start: 2023-10-11

## 2023-10-11 NOTE — TELEPHONE ENCOUNTER
Medication:   Requested Prescriptions     Pending Prescriptions Disp Refills    meloxicam (MOBIC) 15 MG tablet [Pharmacy Med Name: Meloxicam Oral Tablet 15 MG] 30 tablet 0     Sig: TAKE 1 TABLET BY MOUTH AT BEDTIME        Last Filled:  9/1/2023     Patient Phone Number: [phone] (home)     Last appt: 9/14/2023   Next appt: Visit date not found            Return in about 3 months (around 12/14/2023) for mood recheck

## 2023-10-12 DIAGNOSIS — G43.109 MIGRAINE WITH AURA AND WITHOUT STATUS MIGRAINOSUS, NOT INTRACTABLE: ICD-10-CM

## 2023-10-13 ENCOUNTER — OFFICE VISIT (OUTPATIENT)
Dept: ENT CLINIC | Age: 38
End: 2023-10-13

## 2023-10-13 VITALS
SYSTOLIC BLOOD PRESSURE: 123 MMHG | HEART RATE: 75 BPM | DIASTOLIC BLOOD PRESSURE: 82 MMHG | BODY MASS INDEX: 35.49 KG/M2 | HEIGHT: 65 IN | WEIGHT: 213 LBS | TEMPERATURE: 96.8 F

## 2023-10-13 DIAGNOSIS — Z98.890 HISTORY OF NASAL SEPTOPLASTY: Primary | ICD-10-CM

## 2023-10-13 DIAGNOSIS — Z98.890 HISTORY OF ENDOSCOPIC SINUS SURGERY: ICD-10-CM

## 2023-10-13 DIAGNOSIS — J32.2 CHRONIC ETHMOIDAL SINUSITIS: ICD-10-CM

## 2023-10-13 DIAGNOSIS — Z48.89 POSTOPERATIVE VISIT: ICD-10-CM

## 2023-10-13 DIAGNOSIS — J32.0 CHRONIC MAXILLARY SINUSITIS: ICD-10-CM

## 2023-10-13 RX ORDER — SUMATRIPTAN 50 MG/1
50 TABLET, FILM COATED ORAL
Qty: 27 TABLET | Refills: 1 | Status: SHIPPED | OUTPATIENT
Start: 2023-10-13 | End: 2023-10-13

## 2023-10-13 NOTE — TELEPHONE ENCOUNTER
Medication:   Requested Prescriptions     Pending Prescriptions Disp Refills    SUMAtriptan (IMITREX) 50 MG tablet 27 tablet 1     Sig: Take 1 tablet by mouth once as needed for Migraine        Last Filled:  02/8/22    Patient Phone Number: [phone] (home)     Last appt: 9/14/2023     Return in about 3 months (around 12/14/2023) for mood recheck  Next appt: Visit date not found    Last OARRS:       10/9/2017     5:34 PM   RX Monitoring   Attestation The Prescription Monitoring Report for this patient was reviewed today. Periodic Controlled Substance Monitoring No signs of potential drug abuse or diversion identified.

## 2023-11-06 DIAGNOSIS — M25.511 ACUTE PAIN OF RIGHT SHOULDER: ICD-10-CM

## 2023-11-07 RX ORDER — MELOXICAM 15 MG/1
15 TABLET ORAL NIGHTLY
Qty: 30 TABLET | Refills: 5 | Status: SHIPPED | OUTPATIENT
Start: 2023-11-07

## 2023-11-07 NOTE — TELEPHONE ENCOUNTER
Medication:   Requested Prescriptions     Pending Prescriptions Disp Refills    meloxicam (MOBIC) 15 MG tablet [Pharmacy Med Name: Meloxicam Oral Tablet 15 MG] 30 tablet 0     Sig: TAKE 1 TABLET BY MOUTH AT BEDTIME        Last Filled:  10/11/23    Patient Phone Number: [phone] (home)     Last appt: 9/14/2023   Return in about 3 months (around 12/14/2023) for mood recheck. Next appt: Visit date not found    Last OARRS:       10/9/2017     5:34 PM   RX Monitoring   Attestation The Prescription Monitoring Report for this patient was reviewed today. Periodic Controlled Substance Monitoring No signs of potential drug abuse or diversion identified.

## 2023-11-27 RX ORDER — MELATONIN
Qty: 90 TABLET | Refills: 0 | Status: SHIPPED | OUTPATIENT
Start: 2023-11-27

## 2023-11-27 NOTE — TELEPHONE ENCOUNTER
Medication:   Requested Prescriptions     Pending Prescriptions Disp Refills    vitamin D3 (CHOLECALCIFEROL) 25 MCG (1000 UT) TABS tablet [Pharmacy Med Name: Vitamin D3 Oral Tablet 25 MCG (1000 UT)] 90 tablet 0     Sig: TAKE 1 TABLET BY MOUTH EVERY DAY        Last Filled:  7/10/23    Last appt: 9/14/2023   Next appt: Visit date not found  Return in about 3 months (around 12/14/2023) for mood recheck. Last OARRS:       10/9/2017     5:34 PM   RX Monitoring   Attestation The Prescription Monitoring Report for this patient was reviewed today. Periodic Controlled Substance Monitoring No signs of potential drug abuse or diversion identified.

## 2023-12-05 ENCOUNTER — OFFICE VISIT (OUTPATIENT)
Dept: ENT CLINIC | Age: 38
End: 2023-12-05
Payer: COMMERCIAL

## 2023-12-05 VITALS
WEIGHT: 222.8 LBS | RESPIRATION RATE: 16 BRPM | DIASTOLIC BLOOD PRESSURE: 75 MMHG | TEMPERATURE: 97.1 F | HEIGHT: 65 IN | HEART RATE: 78 BPM | BODY MASS INDEX: 37.12 KG/M2 | SYSTOLIC BLOOD PRESSURE: 115 MMHG

## 2023-12-05 DIAGNOSIS — Z98.890 HISTORY OF NASAL SEPTOPLASTY: Primary | ICD-10-CM

## 2023-12-05 DIAGNOSIS — J32.2 CHRONIC ETHMOIDAL SINUSITIS: ICD-10-CM

## 2023-12-05 DIAGNOSIS — Z98.890 HISTORY OF ENDOSCOPIC SINUS SURGERY: ICD-10-CM

## 2023-12-05 DIAGNOSIS — J32.0 CHRONIC MAXILLARY SINUSITIS: ICD-10-CM

## 2023-12-05 PROCEDURE — 31231 NASAL ENDOSCOPY DX: CPT | Performed by: OTOLARYNGOLOGY

## 2023-12-05 PROCEDURE — 99024 POSTOP FOLLOW-UP VISIT: CPT | Performed by: OTOLARYNGOLOGY

## 2024-01-07 DIAGNOSIS — F41.1 GAD (GENERALIZED ANXIETY DISORDER): ICD-10-CM

## 2024-01-07 DIAGNOSIS — F33.1 MAJOR DEPRESSIVE DISORDER, RECURRENT, MODERATE (HCC): ICD-10-CM

## 2024-01-07 DIAGNOSIS — F33.41 MAJOR DEPRESSIVE DISORDER, RECURRENT, IN PARTIAL REMISSION (HCC): ICD-10-CM

## 2024-01-08 RX ORDER — SERTRALINE HYDROCHLORIDE 100 MG/1
TABLET, FILM COATED ORAL
Qty: 30 TABLET | Refills: 0 | Status: SHIPPED | OUTPATIENT
Start: 2024-01-08 | End: 2024-01-31

## 2024-01-08 NOTE — TELEPHONE ENCOUNTER
Medication:   Requested Prescriptions     Pending Prescriptions Disp Refills    sertraline (ZOLOFT) 100 MG tablet [Pharmacy Med Name: Sertraline HCl Oral Tablet 100 MG] 30 tablet 0     Sig: TAKE 1 TABLET BY MOUTH EVERY DAY    sertraline (ZOLOFT) 50 MG tablet [Pharmacy Med Name: Sertraline HCl Oral Tablet 50 MG] 30 tablet 0     Sig: TAKE 1 TABLET BY MOUTH EVERY DAY        Last Filled:  10/10/2023    Patient Phone Number: [phone] (home)     Last appt: 9/14/2023   Next appt: Visit date not found    Last OARRS:       10/9/2017     5:34 PM   RX Monitoring   Attestation The Prescription Monitoring Report for this patient was reviewed today.   Periodic Controlled Substance Monitoring No signs of potential drug abuse or diversion identified.

## 2024-01-31 ENCOUNTER — OFFICE VISIT (OUTPATIENT)
Dept: [facility] CLINIC | Age: 39
End: 2024-01-31
Payer: COMMERCIAL

## 2024-01-31 VITALS
BODY MASS INDEX: 36.29 KG/M2 | SYSTOLIC BLOOD PRESSURE: 137 MMHG | WEIGHT: 217.81 LBS | HEIGHT: 65 IN | TEMPERATURE: 97 F | DIASTOLIC BLOOD PRESSURE: 77 MMHG | HEART RATE: 74 BPM

## 2024-01-31 DIAGNOSIS — F33.41 MAJOR DEPRESSIVE DISORDER, RECURRENT, IN PARTIAL REMISSION (HCC): Primary | ICD-10-CM

## 2024-01-31 DIAGNOSIS — E66.09 OBESITY DUE TO EXCESS CALORIES WITH SERIOUS COMORBIDITY, UNSPECIFIED CLASSIFICATION: ICD-10-CM

## 2024-01-31 DIAGNOSIS — N91.3 PRIMARY OLIGOMENORRHEA: ICD-10-CM

## 2024-01-31 DIAGNOSIS — F41.1 GAD (GENERALIZED ANXIETY DISORDER): ICD-10-CM

## 2024-01-31 PROBLEM — J32.0 CHRONIC MAXILLARY SINUSITIS: Status: RESOLVED | Noted: 2023-09-18 | Resolved: 2024-01-31

## 2024-01-31 PROBLEM — J34.89 NASAL OBSTRUCTION: Status: RESOLVED | Noted: 2023-09-18 | Resolved: 2024-01-31

## 2024-01-31 PROBLEM — J34.89 CONCHA BULLOSA: Status: RESOLVED | Noted: 2023-09-18 | Resolved: 2024-01-31

## 2024-01-31 PROBLEM — J32.2 CHRONIC ETHMOIDAL SINUSITIS: Status: RESOLVED | Noted: 2023-09-18 | Resolved: 2024-01-31

## 2024-01-31 PROBLEM — J34.3 NASAL TURBINATE HYPERTROPHY: Status: RESOLVED | Noted: 2023-09-18 | Resolved: 2024-01-31

## 2024-01-31 PROCEDURE — G8484 FLU IMMUNIZE NO ADMIN: HCPCS | Performed by: STUDENT IN AN ORGANIZED HEALTH CARE EDUCATION/TRAINING PROGRAM

## 2024-01-31 PROCEDURE — 99214 OFFICE O/P EST MOD 30 MIN: CPT | Performed by: STUDENT IN AN ORGANIZED HEALTH CARE EDUCATION/TRAINING PROGRAM

## 2024-01-31 PROCEDURE — 96127 BRIEF EMOTIONAL/BEHAV ASSMT: CPT | Performed by: STUDENT IN AN ORGANIZED HEALTH CARE EDUCATION/TRAINING PROGRAM

## 2024-01-31 PROCEDURE — 1036F TOBACCO NON-USER: CPT | Performed by: STUDENT IN AN ORGANIZED HEALTH CARE EDUCATION/TRAINING PROGRAM

## 2024-01-31 PROCEDURE — 3075F SYST BP GE 130 - 139MM HG: CPT | Performed by: STUDENT IN AN ORGANIZED HEALTH CARE EDUCATION/TRAINING PROGRAM

## 2024-01-31 PROCEDURE — 3078F DIAST BP <80 MM HG: CPT | Performed by: STUDENT IN AN ORGANIZED HEALTH CARE EDUCATION/TRAINING PROGRAM

## 2024-01-31 PROCEDURE — G8417 CALC BMI ABV UP PARAM F/U: HCPCS | Performed by: STUDENT IN AN ORGANIZED HEALTH CARE EDUCATION/TRAINING PROGRAM

## 2024-01-31 PROCEDURE — G8427 DOCREV CUR MEDS BY ELIG CLIN: HCPCS | Performed by: STUDENT IN AN ORGANIZED HEALTH CARE EDUCATION/TRAINING PROGRAM

## 2024-01-31 RX ORDER — FLUOXETINE 10 MG/1
CAPSULE ORAL
Qty: 49 CAPSULE | Refills: 0 | Status: SHIPPED | OUTPATIENT
Start: 2024-01-31 | End: 2024-02-28

## 2024-01-31 RX ORDER — SERTRALINE HYDROCHLORIDE 25 MG/1
TABLET, FILM COATED ORAL
Qty: 49 TABLET | Refills: 0 | Status: SHIPPED | OUTPATIENT
Start: 2024-01-31 | End: 2024-02-21

## 2024-01-31 RX ORDER — FLUTICASONE PROPIONATE 50 MCG
2 SPRAY, SUSPENSION (ML) NASAL DAILY
COMMUNITY
Start: 2023-11-27

## 2024-01-31 ASSESSMENT — PATIENT HEALTH QUESTIONNAIRE - PHQ9
9. THOUGHTS THAT YOU WOULD BE BETTER OFF DEAD, OR OF HURTING YOURSELF: 0
SUM OF ALL RESPONSES TO PHQ QUESTIONS 1-9: 17
SUM OF ALL RESPONSES TO PHQ QUESTIONS 1-9: 17
1. LITTLE INTEREST OR PLEASURE IN DOING THINGS: 3
4. FEELING TIRED OR HAVING LITTLE ENERGY: 3
SUM OF ALL RESPONSES TO PHQ QUESTIONS 1-9: 17
SUM OF ALL RESPONSES TO PHQ QUESTIONS 1-9: 17
10. IF YOU CHECKED OFF ANY PROBLEMS, HOW DIFFICULT HAVE THESE PROBLEMS MADE IT FOR YOU TO DO YOUR WORK, TAKE CARE OF THINGS AT HOME, OR GET ALONG WITH OTHER PEOPLE: 2
7. TROUBLE CONCENTRATING ON THINGS, SUCH AS READING THE NEWSPAPER OR WATCHING TELEVISION: 2
2. FEELING DOWN, DEPRESSED OR HOPELESS: 3
6. FEELING BAD ABOUT YOURSELF - OR THAT YOU ARE A FAILURE OR HAVE LET YOURSELF OR YOUR FAMILY DOWN: 2
5. POOR APPETITE OR OVEREATING: 2
3. TROUBLE FALLING OR STAYING ASLEEP: 2
8. MOVING OR SPEAKING SO SLOWLY THAT OTHER PEOPLE COULD HAVE NOTICED. OR THE OPPOSITE, BEING SO FIGETY OR RESTLESS THAT YOU HAVE BEEN MOVING AROUND A LOT MORE THAN USUAL: 0
SUM OF ALL RESPONSES TO PHQ9 QUESTIONS 1 & 2: 6

## 2024-01-31 ASSESSMENT — ENCOUNTER SYMPTOMS
ABDOMINAL PAIN: 0
CHEST TIGHTNESS: 0
SHORTNESS OF BREATH: 0
CONSTIPATION: 0
NAUSEA: 0

## 2024-01-31 ASSESSMENT — ANXIETY QUESTIONNAIRES
4. TROUBLE RELAXING: 3
3. WORRYING TOO MUCH ABOUT DIFFERENT THINGS: 2
GAD7 TOTAL SCORE: 16
IF YOU CHECKED OFF ANY PROBLEMS ON THIS QUESTIONNAIRE, HOW DIFFICULT HAVE THESE PROBLEMS MADE IT FOR YOU TO DO YOUR WORK, TAKE CARE OF THINGS AT HOME, OR GET ALONG WITH OTHER PEOPLE: VERY DIFFICULT
2. NOT BEING ABLE TO STOP OR CONTROL WORRYING: 2
5. BEING SO RESTLESS THAT IT IS HARD TO SIT STILL: 1
7. FEELING AFRAID AS IF SOMETHING AWFUL MIGHT HAPPEN: 2
6. BECOMING EASILY ANNOYED OR IRRITABLE: 3
1. FEELING NERVOUS, ANXIOUS, OR ON EDGE: 3

## 2024-01-31 NOTE — PROGRESS NOTES
[facility]  2024    Kisha Acuna (:  1985) is a 38 y.o. female, here for evaluation of the following medical concerns:    Chief Complaint   Patient presents with    Weight Management    Depression    Hormone Issues        ASSESSMENT/ PLAN  1. Major depressive disorder, recurrent, in partial remission (HCC)  2. DAKOTA (generalized anxiety disorder)  Uncontrolled, schedule provided to wean off of Zoloft and initiate Prozac per below.        2024    12:47 PM 2023     4:49 PM 2022     9:00 AM 3/22/2022     8:00 AM   DAKOTA 7 SCORE   DAKOTA-7 Total Score 16 7 4    DAKOTA-7 Total Score    11     Interpretation of DAKOTA-7 score: 5-9 = mild anxiety, 10-14 = moderate anxiety, 15+ = severe anxiety. Recommend referral to behavioral health for scores 10 or greater.    PHQ-9 Total Score: 17 (2024 12:46 PM)  Thoughts that you would be better off dead, or of hurting yourself in some way: 0 (2024 12:46 PM)    - sertraline (ZOLOFT) 25 MG tablet; Take 4 tablets by mouth daily for 7 days, THEN 2 tablets daily for 7 days, THEN 1 tablet daily for 7 days.  Dispense: 49 tablet; Refill: 0  - FLUoxetine (PROZAC) 10 MG capsule; Take 1 capsule by mouth daily for 7 days, THEN 2 capsules daily for 21 days.  Dispense: 49 capsule; Refill: 0    3. Primary oligomenorrhea  Check labs to assess for secondary causes  - TSH with Reflex; Future  - Follicle Stimulating Hormone; Future  - Prolactin; Future    4. Obesity due to excess calories with serious comorbidity, unspecified classification  Complicates all aspects of patient's care  - Comprehensive Metabolic Panel; Future  - Hemoglobin A1C; Future  - Lipid Panel; Future     Return in about 4 weeks (around 2024) for mood recheck, VV.  Week 1: 100mg zoloft + 10mg prozac  Week 2: 50mg zoloft + 20mg prozac  Week 3: 25mg zoloft + 20mg prozac  Week 4: stop zoloft + 20mg prozac  Week 5: follow up appointment (virtual visit is fine)    HPI  Patient presents today

## 2024-02-07 DIAGNOSIS — N91.3 PRIMARY OLIGOMENORRHEA: ICD-10-CM

## 2024-02-07 DIAGNOSIS — E66.09 OBESITY DUE TO EXCESS CALORIES WITH SERIOUS COMORBIDITY, UNSPECIFIED CLASSIFICATION: ICD-10-CM

## 2024-02-07 LAB
ALBUMIN SERPL-MCNC: 4.4 G/DL (ref 3.4–5)
ALBUMIN/GLOB SERPL: 1.8 {RATIO} (ref 1.1–2.2)
ALP SERPL-CCNC: 100 U/L (ref 40–129)
ALT SERPL-CCNC: 11 U/L (ref 10–40)
ANION GAP SERPL CALCULATED.3IONS-SCNC: 16 MMOL/L (ref 3–16)
AST SERPL-CCNC: 12 U/L (ref 15–37)
BILIRUB SERPL-MCNC: <0.2 MG/DL (ref 0–1)
BUN SERPL-MCNC: 13 MG/DL (ref 7–20)
CALCIUM SERPL-MCNC: 9.9 MG/DL (ref 8.3–10.6)
CHLORIDE SERPL-SCNC: 99 MMOL/L (ref 99–110)
CHOLEST SERPL-MCNC: 235 MG/DL (ref 0–199)
CO2 SERPL-SCNC: 22 MMOL/L (ref 21–32)
CREAT SERPL-MCNC: 0.6 MG/DL (ref 0.6–1.1)
FSH SERPL-ACNC: 7.3 MIU/ML
GFR SERPLBLD CREATININE-BSD FMLA CKD-EPI: >60 ML/MIN/{1.73_M2}
GLUCOSE SERPL-MCNC: 99 MG/DL (ref 70–99)
HDLC SERPL-MCNC: 45 MG/DL (ref 40–60)
LDLC SERPL CALC-MCNC: 139 MG/DL
POTASSIUM SERPL-SCNC: 4.5 MMOL/L (ref 3.5–5.1)
PROLACTIN SERPL IA-MCNC: 12.9 NG/ML
PROT SERPL-MCNC: 6.9 G/DL (ref 6.4–8.2)
SODIUM SERPL-SCNC: 137 MMOL/L (ref 136–145)
TRIGL SERPL-MCNC: 254 MG/DL (ref 0–150)
TSH SERPL DL<=0.005 MIU/L-ACNC: 1.61 UIU/ML (ref 0.27–4.2)
VLDLC SERPL CALC-MCNC: 51 MG/DL

## 2024-02-08 LAB
EST. AVERAGE GLUCOSE BLD GHB EST-MCNC: 114 MG/DL
HBA1C MFR BLD: 5.6 %

## 2024-03-03 DIAGNOSIS — E55.9 VITAMIN D DEFICIENCY: Primary | ICD-10-CM

## 2024-03-04 RX ORDER — MELATONIN
Qty: 90 TABLET | Refills: 3 | Status: SHIPPED | OUTPATIENT
Start: 2024-03-04

## 2024-03-13 ENCOUNTER — OFFICE VISIT (OUTPATIENT)
Dept: [facility] CLINIC | Age: 39
End: 2024-03-13
Payer: COMMERCIAL

## 2024-03-13 VITALS
HEART RATE: 62 BPM | HEIGHT: 65 IN | TEMPERATURE: 97.4 F | SYSTOLIC BLOOD PRESSURE: 132 MMHG | DIASTOLIC BLOOD PRESSURE: 81 MMHG | WEIGHT: 220.6 LBS | BODY MASS INDEX: 36.75 KG/M2

## 2024-03-13 DIAGNOSIS — I10 PRIMARY HYPERTENSION: ICD-10-CM

## 2024-03-13 DIAGNOSIS — F33.41 MAJOR DEPRESSIVE DISORDER, RECURRENT, IN PARTIAL REMISSION (HCC): Primary | ICD-10-CM

## 2024-03-13 DIAGNOSIS — J45.30 MILD PERSISTENT ASTHMA, UNCOMPLICATED: ICD-10-CM

## 2024-03-13 PROCEDURE — 99214 OFFICE O/P EST MOD 30 MIN: CPT | Performed by: STUDENT IN AN ORGANIZED HEALTH CARE EDUCATION/TRAINING PROGRAM

## 2024-03-13 PROCEDURE — G8484 FLU IMMUNIZE NO ADMIN: HCPCS | Performed by: STUDENT IN AN ORGANIZED HEALTH CARE EDUCATION/TRAINING PROGRAM

## 2024-03-13 PROCEDURE — 3075F SYST BP GE 130 - 139MM HG: CPT | Performed by: STUDENT IN AN ORGANIZED HEALTH CARE EDUCATION/TRAINING PROGRAM

## 2024-03-13 PROCEDURE — 1036F TOBACCO NON-USER: CPT | Performed by: STUDENT IN AN ORGANIZED HEALTH CARE EDUCATION/TRAINING PROGRAM

## 2024-03-13 PROCEDURE — G8417 CALC BMI ABV UP PARAM F/U: HCPCS | Performed by: STUDENT IN AN ORGANIZED HEALTH CARE EDUCATION/TRAINING PROGRAM

## 2024-03-13 PROCEDURE — 3079F DIAST BP 80-89 MM HG: CPT | Performed by: STUDENT IN AN ORGANIZED HEALTH CARE EDUCATION/TRAINING PROGRAM

## 2024-03-13 PROCEDURE — G8427 DOCREV CUR MEDS BY ELIG CLIN: HCPCS | Performed by: STUDENT IN AN ORGANIZED HEALTH CARE EDUCATION/TRAINING PROGRAM

## 2024-03-13 PROCEDURE — 96127 BRIEF EMOTIONAL/BEHAV ASSMT: CPT | Performed by: STUDENT IN AN ORGANIZED HEALTH CARE EDUCATION/TRAINING PROGRAM

## 2024-03-13 RX ORDER — FLUOXETINE HYDROCHLORIDE 40 MG/1
40 CAPSULE ORAL DAILY
Qty: 30 CAPSULE | Refills: 3
Start: 2024-03-13

## 2024-03-13 SDOH — ECONOMIC STABILITY: FOOD INSECURITY: WITHIN THE PAST 12 MONTHS, THE FOOD YOU BOUGHT JUST DIDN'T LAST AND YOU DIDN'T HAVE MONEY TO GET MORE.: NEVER TRUE

## 2024-03-13 SDOH — ECONOMIC STABILITY: FOOD INSECURITY: WITHIN THE PAST 12 MONTHS, YOU WORRIED THAT YOUR FOOD WOULD RUN OUT BEFORE YOU GOT MONEY TO BUY MORE.: NEVER TRUE

## 2024-03-13 SDOH — ECONOMIC STABILITY: INCOME INSECURITY: HOW HARD IS IT FOR YOU TO PAY FOR THE VERY BASICS LIKE FOOD, HOUSING, MEDICAL CARE, AND HEATING?: NOT HARD AT ALL

## 2024-03-13 ASSESSMENT — PATIENT HEALTH QUESTIONNAIRE - PHQ9
SUM OF ALL RESPONSES TO PHQ QUESTIONS 1-9: 15
6. FEELING BAD ABOUT YOURSELF - OR THAT YOU ARE A FAILURE OR HAVE LET YOURSELF OR YOUR FAMILY DOWN: 2
SUM OF ALL RESPONSES TO PHQ QUESTIONS 1-9: 15
5. POOR APPETITE OR OVEREATING: 1
9. THOUGHTS THAT YOU WOULD BE BETTER OFF DEAD, OR OF HURTING YOURSELF: 0
2. FEELING DOWN, DEPRESSED OR HOPELESS: 1
SUM OF ALL RESPONSES TO PHQ QUESTIONS 1-9: 15
7. TROUBLE CONCENTRATING ON THINGS, SUCH AS READING THE NEWSPAPER OR WATCHING TELEVISION: 2
8. MOVING OR SPEAKING SO SLOWLY THAT OTHER PEOPLE COULD HAVE NOTICED. OR THE OPPOSITE, BEING SO FIGETY OR RESTLESS THAT YOU HAVE BEEN MOVING AROUND A LOT MORE THAN USUAL: 0
SUM OF ALL RESPONSES TO PHQ QUESTIONS 1-9: 15
1. LITTLE INTEREST OR PLEASURE IN DOING THINGS: 3
SUM OF ALL RESPONSES TO PHQ9 QUESTIONS 1 & 2: 4
10. IF YOU CHECKED OFF ANY PROBLEMS, HOW DIFFICULT HAVE THESE PROBLEMS MADE IT FOR YOU TO DO YOUR WORK, TAKE CARE OF THINGS AT HOME, OR GET ALONG WITH OTHER PEOPLE: 2
3. TROUBLE FALLING OR STAYING ASLEEP: 3
4. FEELING TIRED OR HAVING LITTLE ENERGY: 3

## 2024-03-13 ASSESSMENT — ENCOUNTER SYMPTOMS
CONSTIPATION: 0
CHEST TIGHTNESS: 0
ABDOMINAL PAIN: 0
NAUSEA: 0
SHORTNESS OF BREATH: 0

## 2024-03-13 NOTE — PROGRESS NOTES
recognition computer software.  Although all attempts are made to edit the dictation for accuracy, there may be errors in the transcription that are not intended.

## 2024-03-14 DIAGNOSIS — I10 ESSENTIAL HYPERTENSION: ICD-10-CM

## 2024-03-14 RX ORDER — METOPROLOL SUCCINATE 25 MG/1
TABLET, EXTENDED RELEASE ORAL
Qty: 30 TABLET | Refills: 5 | Status: SHIPPED | OUTPATIENT
Start: 2024-03-14

## 2024-03-14 NOTE — TELEPHONE ENCOUNTER
Medication:   Requested Prescriptions     Pending Prescriptions Disp Refills    metoprolol succinate (TOPROL XL) 25 MG extended release tablet [Pharmacy Med Name: Metoprolol Succinate ER Oral Tablet Extended Release 24 Hour 25 MG] 30 tablet 0     Sig: TAKE 1 TABLET BY MOUTH EVERY DAY        Last Filled:  9/11/23    Patient Phone Number: [phone] (home)     Last appt: 3/13/2024   Next appt: 9/10/2024    Last OARRS:       10/9/2017     5:34 PM   RX Monitoring   Attestation The Prescription Monitoring Report for this patient was reviewed today.   Periodic Controlled Substance Monitoring No signs of potential drug abuse or diversion identified.

## 2024-03-27 ENCOUNTER — TELEMEDICINE (OUTPATIENT)
Dept: [facility] CLINIC | Age: 39
End: 2024-03-27

## 2024-03-27 DIAGNOSIS — H10.9 BACTERIAL CONJUNCTIVITIS OF RIGHT EYE: ICD-10-CM

## 2024-03-27 DIAGNOSIS — J32.9 BACTERIAL SINUSITIS: ICD-10-CM

## 2024-03-27 DIAGNOSIS — U07.1 COVID-19: Primary | ICD-10-CM

## 2024-03-27 DIAGNOSIS — B96.89 BACTERIAL SINUSITIS: ICD-10-CM

## 2024-03-27 PROCEDURE — 99214 OFFICE O/P EST MOD 30 MIN: CPT | Performed by: STUDENT IN AN ORGANIZED HEALTH CARE EDUCATION/TRAINING PROGRAM

## 2024-03-27 RX ORDER — DOXYCYCLINE HYCLATE 100 MG
100 TABLET ORAL 2 TIMES DAILY
Qty: 14 TABLET | Refills: 0 | Status: SHIPPED | OUTPATIENT
Start: 2024-03-27 | End: 2024-04-03

## 2024-03-27 RX ORDER — CARIPRAZINE 1.5 MG/1
1.5 CAPSULE, GELATIN COATED ORAL DAILY
COMMUNITY
Start: 2024-03-19

## 2024-03-27 RX ORDER — POLYMYXIN B SULFATE AND TRIMETHOPRIM 1; 10000 MG/ML; [USP'U]/ML
1 SOLUTION OPHTHALMIC EVERY 4 HOURS
Qty: 3 ML | Refills: 0 | Status: SHIPPED | OUTPATIENT
Start: 2024-03-27 | End: 2024-04-06

## 2024-03-27 ASSESSMENT — ENCOUNTER SYMPTOMS
WHEEZING: 0
SINUS PRESSURE: 1
SORE THROAT: 0
VOMITING: 0
DIARRHEA: 0
EYE PAIN: 0
NAUSEA: 0
EYE DISCHARGE: 1
COUGH: 1
RHINORRHEA: 1
SHORTNESS OF BREATH: 0

## 2024-03-27 NOTE — PROGRESS NOTES
[facility]  Virtual visit   3/27/2024    Kisha Acuna (:  1985) is a 39 y.o. female, here for evaluation of the following medical concerns:    Chief Complaint   Patient presents with    Conjunctivitis    URI        ASSESSMENT/ PLAN:  1. COVID-19  Resolved, continue to monitor    2. Bacterial conjunctivitis of right eye  Initiate Polytrim drops as prescribed  - trimethoprim-polymyxin b (POLYTRIM) 61772-7.1 UNIT/ML-% ophthalmic solution; Place 1 drop into both eyes every 4 hours for 10 days  Dispense: 3 mL; Refill: 0    3. Bacterial sinusitis  Uncontrolled, this is new problem.  She endorses allergies to azithromycin, sulfa antibiotics and penicillin.  Initiate doxycycline and follow-up in the office if persistent or worsening symptoms  - doxycycline hyclate (VIBRA-TABS) 100 MG tablet; Take 1 tablet by mouth 2 times daily for 7 days  Dispense: 14 tablet; Refill: 0       Return if symptoms worsen or fail to improve.    HPI  Patient presents today for concerns of sinus congestion, eye discharge.    Notes that she tested positive for COVID last week.  Symptoms resolved over the course of 3 to 5 days, and she woke up this morning with her right eye crusted shut, purulent rhinorrhea and sinus pressure.  She denies fever, chest pain.    ROS  Review of Systems   Constitutional:  Negative for activity change, appetite change, fatigue and fever.   HENT:  Positive for congestion, rhinorrhea and sinus pressure. Negative for ear pain and sore throat.    Eyes:  Positive for discharge. Negative for pain.   Respiratory:  Positive for cough. Negative for shortness of breath and wheezing.    Cardiovascular:  Negative for chest pain.   Gastrointestinal:  Negative for diarrhea, nausea and vomiting.   Genitourinary:  Negative for decreased urine volume.   Musculoskeletal:  Negative for myalgias.   Neurological:  Negative for headaches.       HISTORIES  Current Outpatient Medications on File Prior to Visit

## 2024-04-02 DIAGNOSIS — I10 ESSENTIAL HYPERTENSION: ICD-10-CM

## 2024-04-02 RX ORDER — TRIAMTERENE AND HYDROCHLOROTHIAZIDE 37.5; 25 MG/1; MG/1
CAPSULE ORAL
Qty: 90 CAPSULE | Refills: 1 | Status: SHIPPED | OUTPATIENT
Start: 2024-04-02

## 2024-04-11 ENCOUNTER — OFFICE VISIT (OUTPATIENT)
Age: 39
End: 2024-04-11

## 2024-04-11 VITALS
HEART RATE: 91 BPM | WEIGHT: 220 LBS | DIASTOLIC BLOOD PRESSURE: 84 MMHG | RESPIRATION RATE: 16 BRPM | TEMPERATURE: 98.3 F | BODY MASS INDEX: 36.61 KG/M2 | SYSTOLIC BLOOD PRESSURE: 138 MMHG | OXYGEN SATURATION: 96 %

## 2024-04-11 DIAGNOSIS — J01.10 ACUTE FRONTAL SINUSITIS, RECURRENCE NOT SPECIFIED: Primary | ICD-10-CM

## 2024-04-11 DIAGNOSIS — J02.9 SORE THROAT: ICD-10-CM

## 2024-04-11 LAB — STREPTOCOCCUS A RNA: NEGATIVE

## 2024-04-11 RX ORDER — LEVOFLOXACIN 500 MG/1
500 TABLET, FILM COATED ORAL DAILY
Qty: 7 TABLET | Refills: 0 | Status: SHIPPED | OUTPATIENT
Start: 2024-04-11 | End: 2024-04-18

## 2024-04-11 NOTE — PROGRESS NOTES
Kisha Acuna (:  1985) is a 39 y.o. female, here for evaluation of the following chief complaint(s):  Sinusitis (Sinus pressure and sore throat x 4 days)      ASSESSMENT/PLAN:  Visit Diagnoses and Associated Orders       Acute frontal sinusitis, recurrence not specified    -  Primary         Sore throat        POCT Rapid Strep A DNA [26429 Custom]           ORDERS WITHOUT AN ASSOCIATED DIAGNOSIS    levoFLOXacin (LEVAQUIN) 500 MG tablet [85338]             Summary  - Kisha's exam and subjective history support the diagnosis for today's visit.  - Treatment failure with doxycycline.  I will advance her antibiotic therapy with levaquin.   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  Because of this, I will be initiating antibiotic therapy as it is appropriate given this risk factors.    Bronchitis, COVID-19, Influenza, Pneumonia, RSV, Strep Pharyngitis, and Viral Syndrome    SUBJECTIVE/OBJECTIVE:  HPI    Kisha reports that symptoms have been gradually worsening. Relevant symptoms include congestion, chest tightness, cough with colored sputum, fever, and sore throat. Treatment to date: doxycycline for a sinus infection that she finished 5 days ago.  She has Guillain Roslindale Syndrome.      Vitals:    24 1827   BP: 138/84   Pulse: 91   Resp: 16   Temp: 98.3 °F (36.8 °C)   TempSrc: Oral   SpO2: 96%   Weight: 99.8 kg (220 lb)       Results for POC orders placed in visit on 24   POCT Rapid Strep A DNA   Result Value Ref Range    Streptococcus A RNA negative         No orders to display       Review of Systems      Physical Exam  Vitals and nursing note reviewed.   Constitutional:       Appearance: Normal appearance. She is obese. She is not ill-appearing, toxic-appearing or diaphoretic.   HENT:      Head: Normocephalic and atraumatic.      Right Ear: Tympanic membrane and external ear normal. There is no impacted cerumen. Tympanic membrane is not erythematous or bulging.

## 2024-04-12 NOTE — PATIENT INSTRUCTIONS
Kisha,    It was an absolute pleasure taking care of you today.  I'm hoping you'll start feeling better as soon as possible. Please call me if you have any questions or concerns about what we talked about today.  Feel free stop back in if anything changes or things seem to be getting worse.  If for some reason you develop any serious or potentially life-threatening issues, call 911 or proceed to the nearest emergency department.  Hopefully it will never come to that.  Otherwise, it was very nice to meet you Kisha.      Thanks,     Josr Connell

## 2024-04-18 ENCOUNTER — TELEPHONE (OUTPATIENT)
Dept: [facility] CLINIC | Age: 39
End: 2024-04-18

## 2024-04-18 NOTE — TELEPHONE ENCOUNTER
The pharmacy called and said when he went to fill the metoprolol this month it alerted him that she is getting propranolol filled by a Dr. Myers . Would like to know if provider is aware and should they fill metoprolol .

## 2024-05-16 DIAGNOSIS — M25.511 ACUTE PAIN OF RIGHT SHOULDER: ICD-10-CM

## 2024-05-16 RX ORDER — MELOXICAM 15 MG/1
15 TABLET ORAL NIGHTLY
Qty: 30 TABLET | Refills: 0 | Status: SHIPPED | OUTPATIENT
Start: 2024-05-16

## 2024-05-16 NOTE — TELEPHONE ENCOUNTER
Medication:   Requested Prescriptions     Pending Prescriptions Disp Refills    meloxicam (MOBIC) 15 MG tablet [Pharmacy Med Name: Meloxicam Oral Tablet 15 MG] 30 tablet 0     Sig: TAKE 1 TABLET BY MOUTH AT BEDTIME        Last Filled:  11/07/23    Patient Phone Number: [phone] (home)     Last appt: 3/27/2024   Return in about 6 months (around 9/13/2024) for annual physical.  Next appt: 9/10/2024    Last OARRS:       10/9/2017     5:34 PM   RX Monitoring   Attestation The Prescription Monitoring Report for this patient was reviewed today.   Periodic Controlled Substance Monitoring No signs of potential drug abuse or diversion identified.

## 2024-06-19 DIAGNOSIS — M25.511 ACUTE PAIN OF RIGHT SHOULDER: ICD-10-CM

## 2024-06-20 RX ORDER — MELOXICAM 15 MG/1
15 TABLET ORAL NIGHTLY
Qty: 30 TABLET | Refills: 0 | Status: SHIPPED | OUTPATIENT
Start: 2024-06-20

## 2024-06-20 NOTE — TELEPHONE ENCOUNTER
Medication:   Requested Prescriptions     Pending Prescriptions Disp Refills    meloxicam (MOBIC) 15 MG tablet [Pharmacy Med Name: Meloxicam Oral Tablet 15 MG] 30 tablet 0     Sig: TAKE 1 TABLET BY MOUTH AT BEDTIME        Last Filled:  05/16/24    Patient Phone Number: [phone] (home)     Last appt: 3/27/2024 Return in about 6 months (around 9/13/2024) for annual physical.   Next appt: 9/10/2024    Last OARRS:       10/9/2017     5:34 PM   RX Monitoring   Attestation The Prescription Monitoring Report for this patient was reviewed today.   Periodic Controlled Substance Monitoring No signs of potential drug abuse or diversion identified.

## 2024-07-03 DIAGNOSIS — I10 ESSENTIAL HYPERTENSION: ICD-10-CM

## 2024-07-03 RX ORDER — AMLODIPINE BESYLATE 5 MG/1
5 TABLET ORAL NIGHTLY
Qty: 90 TABLET | Refills: 0 | Status: SHIPPED | OUTPATIENT
Start: 2024-07-03

## 2024-07-03 NOTE — TELEPHONE ENCOUNTER
Medication:   Requested Prescriptions     Pending Prescriptions Disp Refills    amLODIPine (NORVASC) 5 MG tablet [Pharmacy Med Name: amLODIPine Besylate Oral Tablet 5 MG] 90 tablet 0     Sig: TAKE 1 TABLET BY MOUTH AT BEDTIME        Last Filled:  12/18/23    Patient Phone Number: [phone] (home)     Last appt: 3/27/2024 Return in about 6 months (around 9/13/2024) for annual physical.   Next appt: 9/10/2024    Last OARRS:       10/9/2017     5:34 PM   RX Monitoring   Attestation The Prescription Monitoring Report for this patient was reviewed today.   Periodic Controlled Substance Monitoring No signs of potential drug abuse or diversion identified.

## 2024-07-18 DIAGNOSIS — M25.511 ACUTE PAIN OF RIGHT SHOULDER: ICD-10-CM

## 2024-07-18 RX ORDER — MELOXICAM 15 MG/1
15 TABLET ORAL NIGHTLY
Qty: 30 TABLET | Refills: 5 | Status: SHIPPED | OUTPATIENT
Start: 2024-07-18

## 2024-07-18 NOTE — TELEPHONE ENCOUNTER
Medication:   Requested Prescriptions     Pending Prescriptions Disp Refills    meloxicam (MOBIC) 15 MG tablet [Pharmacy Med Name: Meloxicam Oral Tablet 15 MG] 30 tablet 0     Sig: TAKE 1 TABLET BY MOUTH AT BEDTIME      06/20/24  Last Filled:      Patient Phone Number: [phone] (home)     Last appt: 3/27/2024   Next appt: 9/10/2024    Last OARRS:       10/9/2017     5:34 PM   RX Monitoring   Attestation The Prescription Monitoring Report for this patient was reviewed today.   Periodic Controlled Substance Monitoring No signs of potential drug abuse or diversion identified.

## 2024-08-29 DIAGNOSIS — J45.30 MILD PERSISTENT ASTHMA, UNCOMPLICATED: ICD-10-CM

## 2024-08-30 NOTE — TELEPHONE ENCOUNTER
Medication:   Requested Prescriptions     Pending Prescriptions Disp Refills    albuterol sulfate HFA (PROVENTIL;VENTOLIN;PROAIR) 108 (90 Base) MCG/ACT inhaler [Pharmacy Med Name: Albuterol Sulfate HFA Inhalation Aerosol Solution 108 (90 Base) MCG/ACT] 8.5 g 0     Sig: INHALE 2 PUFFS BY MOUTH EVERY SIX HOURS AS NEEDED FOR WHEEZING        Last Filled:  09/14/23    Patient Phone Number: [phone] (home)     Last appt: 3/27/2024   Next appt: 9/10/2024    Last OARRS:       10/9/2017     5:34 PM   RX Monitoring   Attestation The Prescription Monitoring Report for this patient was reviewed today.   Periodic Controlled Substance Monitoring No signs of potential drug abuse or diversion identified.

## 2024-09-01 RX ORDER — ALBUTEROL SULFATE 90 UG/1
AEROSOL, METERED RESPIRATORY (INHALATION)
Qty: 8.5 G | Refills: 0 | Status: SHIPPED | OUTPATIENT
Start: 2024-09-01

## 2024-09-10 ENCOUNTER — OFFICE VISIT (OUTPATIENT)
Dept: [facility] CLINIC | Age: 39
End: 2024-09-10
Payer: COMMERCIAL

## 2024-09-10 VITALS
SYSTOLIC BLOOD PRESSURE: 136 MMHG | HEART RATE: 87 BPM | TEMPERATURE: 97.3 F | DIASTOLIC BLOOD PRESSURE: 80 MMHG | WEIGHT: 233.6 LBS | HEIGHT: 65 IN | BODY MASS INDEX: 38.92 KG/M2

## 2024-09-10 DIAGNOSIS — I10 PRIMARY HYPERTENSION: ICD-10-CM

## 2024-09-10 DIAGNOSIS — F41.1 GAD (GENERALIZED ANXIETY DISORDER): ICD-10-CM

## 2024-09-10 DIAGNOSIS — Z00.00 ANNUAL PHYSICAL EXAM: Primary | ICD-10-CM

## 2024-09-10 DIAGNOSIS — F33.41 MAJOR DEPRESSIVE DISORDER, RECURRENT, IN PARTIAL REMISSION (HCC): ICD-10-CM

## 2024-09-10 DIAGNOSIS — Z00.00 ANNUAL PHYSICAL EXAM: ICD-10-CM

## 2024-09-10 LAB
25(OH)D3 SERPL-MCNC: 28.1 NG/ML
ALBUMIN SERPL-MCNC: 4.3 G/DL (ref 3.4–5)
ALBUMIN/GLOB SERPL: 1.7 {RATIO} (ref 1.1–2.2)
ALP SERPL-CCNC: 96 U/L (ref 40–129)
ALT SERPL-CCNC: 14 U/L (ref 10–40)
ANION GAP SERPL CALCULATED.3IONS-SCNC: 14 MMOL/L (ref 3–16)
AST SERPL-CCNC: 15 U/L (ref 15–37)
BASOPHILS # BLD: 0 K/UL (ref 0–0.2)
BASOPHILS NFR BLD: 0.4 %
BILIRUB SERPL-MCNC: <0.2 MG/DL (ref 0–1)
BUN SERPL-MCNC: 13 MG/DL (ref 7–20)
CALCIUM SERPL-MCNC: 9.5 MG/DL (ref 8.3–10.6)
CHLORIDE SERPL-SCNC: 99 MMOL/L (ref 99–110)
CO2 SERPL-SCNC: 25 MMOL/L (ref 21–32)
CREAT SERPL-MCNC: 0.6 MG/DL (ref 0.6–1.1)
DEPRECATED RDW RBC AUTO: 14.1 % (ref 12.4–15.4)
EOSINOPHIL # BLD: 0.2 K/UL (ref 0–0.6)
EOSINOPHIL NFR BLD: 2.1 %
EST. AVERAGE GLUCOSE BLD GHB EST-MCNC: 114 MG/DL
GFR SERPLBLD CREATININE-BSD FMLA CKD-EPI: >90 ML/MIN/{1.73_M2}
GLUCOSE SERPL-MCNC: 117 MG/DL (ref 70–99)
HBA1C MFR BLD: 5.6 %
HCT VFR BLD AUTO: 44 % (ref 36–48)
HGB BLD-MCNC: 14.6 G/DL (ref 12–16)
LYMPHOCYTES # BLD: 3.2 K/UL (ref 1–5.1)
LYMPHOCYTES NFR BLD: 29.5 %
MCH RBC QN AUTO: 28.6 PG (ref 26–34)
MCHC RBC AUTO-ENTMCNC: 33.2 G/DL (ref 31–36)
MCV RBC AUTO: 86.1 FL (ref 80–100)
MONOCYTES # BLD: 0.4 K/UL (ref 0–1.3)
MONOCYTES NFR BLD: 3.9 %
NEUTROPHILS # BLD: 7 K/UL (ref 1.7–7.7)
NEUTROPHILS NFR BLD: 64.1 %
PLATELET # BLD AUTO: 528 K/UL (ref 135–450)
PMV BLD AUTO: 7.9 FL (ref 5–10.5)
POTASSIUM SERPL-SCNC: 4.4 MMOL/L (ref 3.5–5.1)
PROT SERPL-MCNC: 6.9 G/DL (ref 6.4–8.2)
RBC # BLD AUTO: 5.11 M/UL (ref 4–5.2)
SODIUM SERPL-SCNC: 138 MMOL/L (ref 136–145)
WBC # BLD AUTO: 11 K/UL (ref 4–11)

## 2024-09-10 PROCEDURE — 3079F DIAST BP 80-89 MM HG: CPT | Performed by: STUDENT IN AN ORGANIZED HEALTH CARE EDUCATION/TRAINING PROGRAM

## 2024-09-10 PROCEDURE — 3075F SYST BP GE 130 - 139MM HG: CPT | Performed by: STUDENT IN AN ORGANIZED HEALTH CARE EDUCATION/TRAINING PROGRAM

## 2024-09-10 PROCEDURE — 99395 PREV VISIT EST AGE 18-39: CPT | Performed by: STUDENT IN AN ORGANIZED HEALTH CARE EDUCATION/TRAINING PROGRAM

## 2024-09-10 RX ORDER — DEXMETHYLPHENIDATE HYDROCHLORIDE 2.5 MG/1
2.5 TABLET ORAL 2 TIMES DAILY
COMMUNITY

## 2024-09-10 RX ORDER — LEUCOVORIN CALCIUM 25 MG/1
25 TABLET ORAL DAILY
COMMUNITY

## 2024-09-10 ASSESSMENT — ENCOUNTER SYMPTOMS
COUGH: 0
CONSTIPATION: 0
DIARRHEA: 0
SHORTNESS OF BREATH: 0

## 2024-09-12 DIAGNOSIS — E55.9 VITAMIN D DEFICIENCY: Primary | ICD-10-CM

## 2024-09-12 RX ORDER — ERGOCALCIFEROL 1.25 MG/1
50000 CAPSULE, LIQUID FILLED ORAL WEEKLY
Qty: 12 CAPSULE | Refills: 1 | Status: SHIPPED | OUTPATIENT
Start: 2024-09-12

## 2024-09-15 DIAGNOSIS — I10 ESSENTIAL HYPERTENSION: ICD-10-CM

## 2024-09-16 RX ORDER — METOPROLOL SUCCINATE 25 MG/1
TABLET, EXTENDED RELEASE ORAL
Qty: 30 TABLET | Refills: 0 | Status: SHIPPED | OUTPATIENT
Start: 2024-09-16

## 2024-09-16 RX ORDER — AMLODIPINE BESYLATE 5 MG/1
5 TABLET ORAL NIGHTLY
Qty: 90 TABLET | Refills: 0 | Status: SHIPPED | OUTPATIENT
Start: 2024-09-16

## 2024-09-25 DIAGNOSIS — Z78.9 USES BIRTH CONTROL: ICD-10-CM

## 2024-09-25 RX ORDER — NORGESTIMATE AND ETHINYL ESTRADIOL 0.25-0.035
1 KIT ORAL DAILY
Qty: 84 TABLET | Refills: 3 | Status: SHIPPED | OUTPATIENT
Start: 2024-09-25

## 2024-10-16 DIAGNOSIS — I10 ESSENTIAL HYPERTENSION: ICD-10-CM

## 2024-10-17 DIAGNOSIS — R29.818 SUSPECTED SLEEP APNEA: Primary | ICD-10-CM

## 2024-10-17 RX ORDER — METOPROLOL SUCCINATE 25 MG/1
TABLET, EXTENDED RELEASE ORAL
Qty: 30 TABLET | Refills: 5 | Status: SHIPPED | OUTPATIENT
Start: 2024-10-17

## 2024-10-17 NOTE — TELEPHONE ENCOUNTER
Medication:   Requested Prescriptions     Pending Prescriptions Disp Refills    metoprolol succinate (TOPROL XL) 25 MG extended release tablet [Pharmacy Med Name: Metoprolol Succinate ER Oral Tablet Extended Release 24 Hour 25 MG] 30 tablet 0     Sig: TAKE 1 TABLET BY MOUTH EVERY DAY        Last Filled:  09/16/24    Patient Phone Number: [phone] (home)     Last appt: 9/10/2024 Return in about 6 months (around 3/10/2025) for mood recheck, blood pressure follow-up.  Next appt: Visit date not found    Last OARRS:       10/9/2017     5:34 PM   RX Monitoring   Attestation The Prescription Monitoring Report for this patient was reviewed today.   Periodic Controlled Substance Monitoring No signs of potential drug abuse or diversion identified.

## 2024-11-30 DIAGNOSIS — I10 ESSENTIAL HYPERTENSION: ICD-10-CM

## 2024-12-02 RX ORDER — TRIAMTERENE AND HYDROCHLOROTHIAZIDE 37.5; 25 MG/1; MG/1
CAPSULE ORAL
Qty: 90 CAPSULE | Refills: 1 | Status: SHIPPED | OUTPATIENT
Start: 2024-12-02

## 2024-12-03 ENCOUNTER — OFFICE VISIT (OUTPATIENT)
Dept: ENT CLINIC | Age: 39
End: 2024-12-03
Payer: COMMERCIAL

## 2024-12-03 ENCOUNTER — PROCEDURE VISIT (OUTPATIENT)
Dept: AUDIOLOGY | Age: 39
End: 2024-12-03
Payer: COMMERCIAL

## 2024-12-03 VITALS
SYSTOLIC BLOOD PRESSURE: 136 MMHG | BODY MASS INDEX: 39.65 KG/M2 | HEIGHT: 65 IN | TEMPERATURE: 97.7 F | HEART RATE: 87 BPM | WEIGHT: 238 LBS | RESPIRATION RATE: 16 BRPM | DIASTOLIC BLOOD PRESSURE: 87 MMHG

## 2024-12-03 DIAGNOSIS — H91.8X3 ASYMMETRICAL HEARING LOSS: ICD-10-CM

## 2024-12-03 DIAGNOSIS — H90.42 SENSORINEURAL HEARING LOSS (SNHL) OF LEFT EAR WITH UNRESTRICTED HEARING OF RIGHT EAR: ICD-10-CM

## 2024-12-03 DIAGNOSIS — H90.42 SENSORINEURAL HEARING LOSS (SNHL) OF LEFT EAR WITH UNRESTRICTED HEARING OF RIGHT EAR: Primary | ICD-10-CM

## 2024-12-03 DIAGNOSIS — H91.20 SUDDEN-ONSET SENSORINEURAL HEARING LOSS: Primary | ICD-10-CM

## 2024-12-03 DIAGNOSIS — J30.1 SEASONAL ALLERGIC RHINITIS DUE TO POLLEN: ICD-10-CM

## 2024-12-03 PROCEDURE — G8484 FLU IMMUNIZE NO ADMIN: HCPCS | Performed by: OTOLARYNGOLOGY

## 2024-12-03 PROCEDURE — 3079F DIAST BP 80-89 MM HG: CPT | Performed by: OTOLARYNGOLOGY

## 2024-12-03 PROCEDURE — G8417 CALC BMI ABV UP PARAM F/U: HCPCS | Performed by: OTOLARYNGOLOGY

## 2024-12-03 PROCEDURE — 92567 TYMPANOMETRY: CPT

## 2024-12-03 PROCEDURE — 3075F SYST BP GE 130 - 139MM HG: CPT | Performed by: OTOLARYNGOLOGY

## 2024-12-03 PROCEDURE — 99213 OFFICE O/P EST LOW 20 MIN: CPT | Performed by: OTOLARYNGOLOGY

## 2024-12-03 PROCEDURE — 1036F TOBACCO NON-USER: CPT | Performed by: OTOLARYNGOLOGY

## 2024-12-03 PROCEDURE — G8427 DOCREV CUR MEDS BY ELIG CLIN: HCPCS | Performed by: OTOLARYNGOLOGY

## 2024-12-03 PROCEDURE — 92557 COMPREHENSIVE HEARING TEST: CPT

## 2024-12-03 RX ORDER — LISDEXAMFETAMINE DIMESYLATE 20 MG/1
CAPSULE ORAL
COMMUNITY
Start: 2024-11-20

## 2024-12-03 RX ORDER — LURASIDONE HYDROCHLORIDE 40 MG/1
TABLET, FILM COATED ORAL
COMMUNITY
Start: 2024-11-18

## 2024-12-03 ASSESSMENT — ENCOUNTER SYMPTOMS
VOICE CHANGE: 0
COUGH: 0
EYE REDNESS: 0
EYE PAIN: 0
NAUSEA: 0
DIARRHEA: 0
SHORTNESS OF BREATH: 0
PHOTOPHOBIA: 0
SINUS PRESSURE: 0
EYE ITCHING: 0
RHINORRHEA: 1
CHOKING: 0
SORE THROAT: 0
STRIDOR: 0
SINUS PAIN: 0
FACIAL SWELLING: 0
COLOR CHANGE: 0
TROUBLE SWALLOWING: 0

## 2024-12-03 NOTE — PROGRESS NOTES
Kisha Acuna   1985, 39 y.o. female   [id number]       Referring Provider: Eleuterio Briggs DO  Referral Type: In an order in Epic    Reason for Visit: Evaluation of suspected change in hearing, tinnitus, or balance.    ADULT AUDIOLOGIC EVALUATION      Kisha Acuna is a 39 y.o. female seen today, 12/3/2024 , for a recheck audiologic evaluation.  Patient was seen by Eleuterio Briggs DO following today's evaluation.    AUDIOLOGIC AND OTHER PERTINENT MEDICAL HISTORY:      Kisha Acuna reports no change in hearing since her previous test on 8/11/2023. She stated that she now has a left-hearing aid that she obtained from the [facility]. She noted that she is still adjusting to it.     Case history and results from previous audiogram on 8/11/2023  Kisha Acuna noted no significant change since last audiogram on 4/4/2023.     Kisha Acuna denied otalgia, aural fullness, otorrhea, dizziness, imbalance, history of falls, history of significant noise exposure, history of head trauma, and history of ear surgery.    Testing revealed essentially normal hearing in the right ear and a normal sloping to severe sensorineural hearing loss in the left ear     Previous history and results from audiologic evaluation on 4/4/2023:  Today's results are consistent with left ear sensorineural hearing loss, slight improvement noted in low to mid-frequencies with slight improvement in word recognition; right ear remains stable and within normal limits.  Hearing loss is significant enough to result in difficulty understanding speech in most listening environments.  Follow medical recommendations from Dr. Briggs.    Date: 12/3/2024     IMPRESSIONS:      Today's results revealed Normal hearing in the right ear and a Normal sloping to Severe Sensorineural hearing loss in the left ear. Excellent speech understanding when in quiet. Tympanometry indicates normal middle ear function. Discussed test results and

## 2024-12-03 NOTE — PROGRESS NOTES
REDUCTION, BILATERAL MAXILLARY ANTROSTOMY, BILATERAL TOTAL ETHMOIDECTOMY, AND SEPTOPLASTY performed by Eleuterio Briggs DO at [facility] OR    SINUS SURGERY      TONSILLECTOMY AND ADENOIDECTOMY  1994    WISDOM TOOTH EXTRACTION         Family History     Family History   Problem Relation Age of Onset    Arthritis Mother     Depression Mother     Other Father         CABG    High Cholesterol Father     Depression Sister     Schizophrenia Sister     Heart Defect Brother     Diabetes Paternal Grandmother     High Cholesterol Paternal Grandmother     Diabetes Paternal Grandfather     High Cholesterol Paternal Grandfather     Cancer Paternal Grandfather         colon    Mult Sclerosis Paternal Aunt        Social History     Social History     Socioeconomic History    Marital status:      Spouse name: sanaz    Number of children: 2    Years of education: Not on file    Highest education level: Not on file   Occupational History     Comment: Self employed - nanny and    Tobacco Use    Smoking status: Never    Smokeless tobacco: Never   Vaping Use    Vaping status: Never Used   Substance and Sexual Activity    Alcohol use: Yes     Alcohol/week: 0.0 standard drinks of alcohol     Comment: rare celebratory    Drug use: Never    Sexual activity: Yes     Partners: Male     Birth control/protection: Pill   Other Topics Concern    Not on file   Social History Narrative    Lives with  and 2 kids      Social Determinants of Health     Financial Resource Strain: Low Risk  (3/13/2024)    Overall Financial Resource Strain (CARDIA)     Difficulty of Paying Living Expenses: Not hard at all   Food Insecurity: No Food Insecurity (3/13/2024)    Hunger Vital Sign     Worried About Running Out of Food in the Last Year: Never true     Ran Out of Food in the Last Year: Never true   Transportation Needs: Unknown (3/13/2024)    PRAPARE - Transportation     Lack of Transportation (Medical): Not on file     Lack of

## 2024-12-04 PROBLEM — I10 PRIMARY HYPERTENSION: Chronic | Status: ACTIVE | Noted: 2023-09-14

## 2024-12-04 PROBLEM — E66.01 CLASS 2 SEVERE OBESITY DUE TO EXCESS CALORIES WITH SERIOUS COMORBIDITY AND BODY MASS INDEX (BMI) OF 39.0 TO 39.9 IN ADULT: Chronic | Status: ACTIVE | Noted: 2024-12-04

## 2024-12-04 PROBLEM — F41.1 GAD (GENERALIZED ANXIETY DISORDER): Chronic | Status: ACTIVE | Noted: 2023-08-17

## 2024-12-04 PROBLEM — E66.812 CLASS 2 SEVERE OBESITY DUE TO EXCESS CALORIES WITH SERIOUS COMORBIDITY AND BODY MASS INDEX (BMI) OF 39.0 TO 39.9 IN ADULT: Chronic | Status: ACTIVE | Noted: 2024-12-04

## 2024-12-05 ENCOUNTER — TELEPHONE (OUTPATIENT)
Dept: SLEEP CENTER | Age: 39
End: 2024-12-05

## 2024-12-16 DIAGNOSIS — E55.9 VITAMIN D DEFICIENCY: ICD-10-CM

## 2024-12-16 NOTE — TELEPHONE ENCOUNTER
Medication:   Requested Prescriptions     Pending Prescriptions Disp Refills    VITAMIN D3 25 MCG TABS tablet [Pharmacy Med Name: Vitamin D3 Oral Tablet 25 MCG] 90 tablet 0     Sig: TAKE 1 TABLET BY MOUTH EVERY DAY        Last Filled:  3/4/24    Patient Phone Number: [phone] (home)     Last appt: 9/10/2024 Return in about 6 months (around 3/10/2025) for mood recheck, blood pressure follow-up.  Next appt: Visit date not found    Last OARRS:       10/9/2017     5:34 PM   RX Monitoring   Attestation The Prescription Monitoring Report for this patient was reviewed today.   Periodic Controlled Substance Monitoring No signs of potential drug abuse or diversion identified.

## 2024-12-17 RX ORDER — CHOLECALCIFEROL (VITAMIN D3) 25 MCG
1 TABLET ORAL DAILY
Qty: 90 TABLET | Refills: 1 | Status: SHIPPED | OUTPATIENT
Start: 2024-12-17

## 2024-12-29 DIAGNOSIS — I10 ESSENTIAL HYPERTENSION: ICD-10-CM

## 2024-12-30 NOTE — TELEPHONE ENCOUNTER
Medication:   Requested Prescriptions     Pending Prescriptions Disp Refills    amLODIPine (NORVASC) 5 MG tablet [Pharmacy Med Name: amLODIPine Besylate Oral Tablet 5 MG] 90 tablet 0     Sig: TAKE 1 TABLET BY MOUTH AT BEDTIME        Last Filled:  9/16/2024    Patient Phone Number: [phone] (home)     Last appt: 9/10/2024  Return in about 6 months (around 3/10/2025) for mood recheck, blood pressure follow-up.  Next appt: Visit date not found    Last OARRS:       10/9/2017     5:34 PM   RX Monitoring   Attestation The Prescription Monitoring Report for this patient was reviewed today.   Periodic Controlled Substance Monitoring No signs of potential drug abuse or diversion identified.

## 2024-12-31 RX ORDER — AMLODIPINE BESYLATE 5 MG/1
5 TABLET ORAL NIGHTLY
Qty: 90 TABLET | Refills: 1 | Status: SHIPPED | OUTPATIENT
Start: 2024-12-31

## 2025-01-02 ENCOUNTER — TELEMEDICINE (OUTPATIENT)
Dept: [facility] CLINIC | Age: 40
End: 2025-01-02

## 2025-01-02 DIAGNOSIS — J32.9 BACTERIAL SINUSITIS: Primary | ICD-10-CM

## 2025-01-02 DIAGNOSIS — J45.30 MILD PERSISTENT ASTHMA, UNCOMPLICATED: ICD-10-CM

## 2025-01-02 DIAGNOSIS — B96.89 BACTERIAL SINUSITIS: Primary | ICD-10-CM

## 2025-01-02 RX ORDER — DOXYCYCLINE HYCLATE 100 MG
100 TABLET ORAL 2 TIMES DAILY
Qty: 10 TABLET | Refills: 0 | Status: SHIPPED | OUTPATIENT
Start: 2025-01-02 | End: 2025-01-07

## 2025-01-02 ASSESSMENT — PATIENT HEALTH QUESTIONNAIRE - PHQ9
5. POOR APPETITE OR OVEREATING: NOT AT ALL
8. MOVING OR SPEAKING SO SLOWLY THAT OTHER PEOPLE COULD HAVE NOTICED. OR THE OPPOSITE, BEING SO FIGETY OR RESTLESS THAT YOU HAVE BEEN MOVING AROUND A LOT MORE THAN USUAL: NOT AT ALL
9. THOUGHTS THAT YOU WOULD BE BETTER OFF DEAD, OR OF HURTING YOURSELF: NOT AT ALL
SUM OF ALL RESPONSES TO PHQ QUESTIONS 1-9: 0
7. TROUBLE CONCENTRATING ON THINGS, SUCH AS READING THE NEWSPAPER OR WATCHING TELEVISION: NOT AT ALL
4. FEELING TIRED OR HAVING LITTLE ENERGY: NOT AT ALL
10. IF YOU CHECKED OFF ANY PROBLEMS, HOW DIFFICULT HAVE THESE PROBLEMS MADE IT FOR YOU TO DO YOUR WORK, TAKE CARE OF THINGS AT HOME, OR GET ALONG WITH OTHER PEOPLE: NOT DIFFICULT AT ALL
SUM OF ALL RESPONSES TO PHQ QUESTIONS 1-9: 0
2. FEELING DOWN, DEPRESSED OR HOPELESS: NOT AT ALL
SUM OF ALL RESPONSES TO PHQ QUESTIONS 1-9: 0
6. FEELING BAD ABOUT YOURSELF - OR THAT YOU ARE A FAILURE OR HAVE LET YOURSELF OR YOUR FAMILY DOWN: NOT AT ALL
SUM OF ALL RESPONSES TO PHQ9 QUESTIONS 1 & 2: 0
SUM OF ALL RESPONSES TO PHQ QUESTIONS 1-9: 0
3. TROUBLE FALLING OR STAYING ASLEEP: NOT AT ALL
1. LITTLE INTEREST OR PLEASURE IN DOING THINGS: NOT AT ALL

## 2025-01-02 ASSESSMENT — ENCOUNTER SYMPTOMS
WHEEZING: 0
NAUSEA: 0
SHORTNESS OF BREATH: 0
DIARRHEA: 0
VOMITING: 0
EYE PAIN: 0
RHINORRHEA: 1
SINUS PRESSURE: 1
SORE THROAT: 0
COUGH: 0

## 2025-01-02 NOTE — PROGRESS NOTES
[facility]  Virtual visit   2025    Kisha Acuna (:  1985) is a 39 y.o. female, here for evaluation of the following medical concerns:    Chief Complaint   Patient presents with    Sinus Problem        ASSESSMENT/ PLAN:  1. Bacterial sinusitis  Uncontrolled, this is a new problem.  Due to severity chronicity of symptoms will initiate antibiotic therapy today.  Patient to follow-up in office and may benefit from CT sinus if persistent or worsening  - doxycycline hyclate (VIBRA-TABS) 100 MG tablet; Take 1 tablet by mouth 2 times daily for 5 days  Dispense: 10 tablet; Refill: 0    2. Mild persistent asthma, uncomplicated  Continue albuterol as needed       Return if symptoms worsen or fail to improve.    HPI  Patient presents today for concerns of sinus pain, congestion and purulent discharge.    She notes onset of symptoms a couple weeks ago which have been progressively worsening in nature.  Has not taken a home COVID test.  She does have a history of asthma and a deviated septum.  She is using nasal saline, Claritin and Tylenol without improvement in symptoms.  No fever, chills, cough or chest pain.    ROS  Review of Systems   Constitutional:  Positive for chills. Negative for activity change, appetite change, fatigue and fever.   HENT:  Positive for congestion, rhinorrhea and sinus pressure. Negative for ear pain and sore throat.    Eyes:  Negative for pain.   Respiratory:  Negative for cough, shortness of breath and wheezing.    Cardiovascular:  Negative for chest pain.   Gastrointestinal:  Negative for diarrhea, nausea and vomiting.   Genitourinary:  Negative for decreased urine volume.   Musculoskeletal:  Negative for myalgias.   Neurological:  Negative for headaches.       HISTORIES  Current Outpatient Medications on File Prior to Visit   Medication Sig Dispense Refill    amLODIPine (NORVASC) 5 MG tablet TAKE 1 TABLET BY MOUTH AT BEDTIME 90 tablet 1    vitamin D (VITAMIN D3) 25 MCG

## 2025-01-18 DIAGNOSIS — M25.511 ACUTE PAIN OF RIGHT SHOULDER: ICD-10-CM

## 2025-01-20 RX ORDER — MELOXICAM 15 MG/1
15 TABLET ORAL NIGHTLY
Qty: 30 TABLET | Refills: 0 | Status: SHIPPED | OUTPATIENT
Start: 2025-01-20

## 2025-01-20 NOTE — TELEPHONE ENCOUNTER
Medication:   Requested Prescriptions     Pending Prescriptions Disp Refills    meloxicam (MOBIC) 15 MG tablet [Pharmacy Med Name: Meloxicam Oral Tablet 15 MG] 30 tablet 0     Sig: TAKE 1 TABLET BY MOUTH AT BEDTIME        Last Filled:  7/18/24    Patient Phone Number: [phone] (home)     Last appt: 1/2/2025 Return in about 6 months (around 3/10/2025) for mood recheck, blood pressure follow-up.  Next appt: Visit date not found    Last OARRS:       10/9/2017     5:34 PM   RX Monitoring   Attestation The Prescription Monitoring Report for this patient was reviewed today.   Periodic Controlled Substance Monitoring No signs of potential drug abuse or diversion identified.

## 2025-01-22 ENCOUNTER — HOSPITAL ENCOUNTER (OUTPATIENT)
Dept: SLEEP CENTER | Age: 40
Discharge: HOME | End: 2025-01-22
Payer: COMMERCIAL

## 2025-01-22 DIAGNOSIS — R06.83 SNORING: ICD-10-CM

## 2025-01-22 DIAGNOSIS — G47.10 HYPERSOMNIA: ICD-10-CM

## 2025-01-22 PROCEDURE — 95806 SLEEP STUDY UNATT&RESP EFFT: CPT

## 2025-01-23 NOTE — PROCEDURES
PROCEDURE NOTE  Date: 1/23/2025   Name: Kisha Acuna  YOB: 1985    Procedures            Electronically signed by SEAN CONTE MD on 1/23/25 at 12:42 PM EST

## 2025-01-27 ENCOUNTER — OFFICE VISIT (OUTPATIENT)
Dept: [facility] CLINIC | Age: 40
End: 2025-01-27
Payer: COMMERCIAL

## 2025-01-27 VITALS
BODY MASS INDEX: 37.85 KG/M2 | HEIGHT: 65 IN | SYSTOLIC BLOOD PRESSURE: 148 MMHG | DIASTOLIC BLOOD PRESSURE: 95 MMHG | TEMPERATURE: 98.2 F | HEART RATE: 101 BPM | WEIGHT: 227.2 LBS

## 2025-01-27 DIAGNOSIS — B96.89 BACTERIAL SINUSITIS: Primary | ICD-10-CM

## 2025-01-27 DIAGNOSIS — J32.9 BACTERIAL SINUSITIS: Primary | ICD-10-CM

## 2025-01-27 DIAGNOSIS — I10 PRIMARY HYPERTENSION: Chronic | ICD-10-CM

## 2025-01-27 DIAGNOSIS — M99.08 RIB CAGE DYSFUNCTION: ICD-10-CM

## 2025-01-27 PROCEDURE — G2211 COMPLEX E/M VISIT ADD ON: HCPCS | Performed by: STUDENT IN AN ORGANIZED HEALTH CARE EDUCATION/TRAINING PROGRAM

## 2025-01-27 PROCEDURE — 3077F SYST BP >= 140 MM HG: CPT | Performed by: STUDENT IN AN ORGANIZED HEALTH CARE EDUCATION/TRAINING PROGRAM

## 2025-01-27 PROCEDURE — 1036F TOBACCO NON-USER: CPT | Performed by: STUDENT IN AN ORGANIZED HEALTH CARE EDUCATION/TRAINING PROGRAM

## 2025-01-27 PROCEDURE — G8417 CALC BMI ABV UP PARAM F/U: HCPCS | Performed by: STUDENT IN AN ORGANIZED HEALTH CARE EDUCATION/TRAINING PROGRAM

## 2025-01-27 PROCEDURE — 99214 OFFICE O/P EST MOD 30 MIN: CPT | Performed by: STUDENT IN AN ORGANIZED HEALTH CARE EDUCATION/TRAINING PROGRAM

## 2025-01-27 PROCEDURE — 3080F DIAST BP >= 90 MM HG: CPT | Performed by: STUDENT IN AN ORGANIZED HEALTH CARE EDUCATION/TRAINING PROGRAM

## 2025-01-27 PROCEDURE — G8427 DOCREV CUR MEDS BY ELIG CLIN: HCPCS | Performed by: STUDENT IN AN ORGANIZED HEALTH CARE EDUCATION/TRAINING PROGRAM

## 2025-01-27 RX ORDER — PREDNISONE 20 MG/1
40 TABLET ORAL EVERY MORNING
Qty: 10 TABLET | Refills: 0 | Status: SHIPPED | OUTPATIENT
Start: 2025-01-27 | End: 2025-02-01

## 2025-01-27 RX ORDER — DOXYCYCLINE HYCLATE 100 MG
100 TABLET ORAL 2 TIMES DAILY
Qty: 20 TABLET | Refills: 0 | Status: SHIPPED | OUTPATIENT
Start: 2025-01-27 | End: 2025-02-06

## 2025-01-27 RX ORDER — FOLIC ACID 1 MG/1
1 TABLET ORAL DAILY
COMMUNITY

## 2025-01-27 SDOH — ECONOMIC STABILITY: FOOD INSECURITY: WITHIN THE PAST 12 MONTHS, YOU WORRIED THAT YOUR FOOD WOULD RUN OUT BEFORE YOU GOT MONEY TO BUY MORE.: NEVER TRUE

## 2025-01-27 SDOH — ECONOMIC STABILITY: FOOD INSECURITY: WITHIN THE PAST 12 MONTHS, THE FOOD YOU BOUGHT JUST DIDN'T LAST AND YOU DIDN'T HAVE MONEY TO GET MORE.: NEVER TRUE

## 2025-01-27 ASSESSMENT — ENCOUNTER SYMPTOMS
WHEEZING: 0
EYE PAIN: 0
DIARRHEA: 0
COUGH: 1
SORE THROAT: 0
SINUS PRESSURE: 0
NAUSEA: 0
VOMITING: 0
SHORTNESS OF BREATH: 0
RHINORRHEA: 1

## 2025-01-27 NOTE — PROGRESS NOTES
[facility]  2025    Kisha Acuna (:  1985) is a 39 y.o. female, here for evaluation of the following medical concerns:    Chief Complaint   Patient presents with    Sinus Problem     Ongoing         ASSESSMENT/ PLAN  1. Bacterial sinusitis  Uncontrolled, has already completed course of 5 days of doxycycline.  Has a penicillin allergy.  History significant for correction of deviated septum.  Initiate 10 days of doxycycline and prednisone.  Follow-up with ENT if symptoms are persistent as she may need updated imaging  - doxycycline hyclate (VIBRA-TABS) 100 MG tablet; Take 1 tablet by mouth 2 times daily for 10 days  Dispense: 20 tablet; Refill: 0  - predniSONE (DELTASONE) 20 MG tablet; Take 2 tablets by mouth every morning for 5 days  Dispense: 10 tablet; Refill: 0    2. Primary hypertension  Uncontrolled, likely secondary to acute illness.  Continue current medication regimen and recheck in 1 month.    3. Rib cage dysfunction  Uncontrolled, this is a new problem.  Likely musculoskeletal dysfunction in the area of concern.  Referral placed to physical therapy for continued evaluation and management.  Also recommended application of Voltaren gel to the area  - [facility]       Return in about 4 weeks (around 2025) for blood pressure follow-up.    HPI  Patient presents today for concerns of recurrent sinusitis.    She had a history of chronic sinusitis, and had surgery last year for correction of her deviated septum.  Since that time, notes 3 sinus infections requiring antibiotics.  This is an improvement from previous.  She completed a course of 5 days of doxycycline, but did not endorse full resolution of symptoms.  States that she typically requires 10 days of antibiotics.    She is taking her amlodipine, metoprolol, Dyazide as prescribed for hypertension.  States that her blood pressure is typically well-controlled when it is checked at her

## 2025-02-12 DIAGNOSIS — M25.511 ACUTE PAIN OF RIGHT SHOULDER: ICD-10-CM

## 2025-02-12 RX ORDER — MELOXICAM 15 MG/1
15 TABLET ORAL NIGHTLY
Qty: 30 TABLET | Refills: 5 | Status: SHIPPED | OUTPATIENT
Start: 2025-02-12

## 2025-02-12 NOTE — TELEPHONE ENCOUNTER
Medication:   Requested Prescriptions     Pending Prescriptions Disp Refills    meloxicam (MOBIC) 15 MG tablet [Pharmacy Med Name: Meloxicam Oral Tablet 15 MG] 30 tablet 0     Sig: TAKE 1 TABLET BY MOUTH AT BEDTIME        Last Filled:  1/20/25    Patient Phone Number: [phone] (home)     Last appt: 1/27/2025   Next appt: 2/24/2025            Return in about 4 weeks (around 2/24/2025) for blood pressure follow-up.

## 2025-02-17 NOTE — PROGRESS NOTES
- Multiple right-sided rib fractures (5-10), present on admission.  - Continue rib fracture protocol.  - Continue to encourage incentive spirometer use and adequate pulmonary hygiene.    - Appreciate APS evaluation and recommendations.  - Continue multimodal analgesic regimen.  - Supplemental oxygen via nasal cannula as needed to maintain saturations greater than or equal to 94%.  - Repeat chest x-ray's at 6 pm today and 6 am tomorrow   - PT and OT evaluation and treatment as indicated.  - Outpatient follow-up in the trauma clinic for re-evaluation in approximately 2 weeks.  Pt c/o migraine in anterior and posterior head. One time dose of Imitrex (home dose) and Excedrin administered. Benadryl PRN administered. New IV placed in L hand by Margarita Doherty RN d/t pt c/o burning in prior IV in RFA. Old IV site clean, dry, warm and appropriate skin color. No infiltration or phlebitis noted. SO updated and VU of POC.     5162: BP elevated.  PRN labetalol admin

## 2025-02-24 ENCOUNTER — OFFICE VISIT (OUTPATIENT)
Dept: [facility] CLINIC | Age: 40
End: 2025-02-24
Payer: COMMERCIAL

## 2025-02-24 VITALS
HEART RATE: 94 BPM | WEIGHT: 227.4 LBS | TEMPERATURE: 97.9 F | SYSTOLIC BLOOD PRESSURE: 122 MMHG | DIASTOLIC BLOOD PRESSURE: 88 MMHG | HEIGHT: 65 IN | BODY MASS INDEX: 37.89 KG/M2

## 2025-02-24 DIAGNOSIS — E66.01 CLASS 2 SEVERE OBESITY DUE TO EXCESS CALORIES WITH SERIOUS COMORBIDITY AND BODY MASS INDEX (BMI) OF 39.0 TO 39.9 IN ADULT: Chronic | ICD-10-CM

## 2025-02-24 DIAGNOSIS — E66.812 CLASS 2 SEVERE OBESITY DUE TO EXCESS CALORIES WITH SERIOUS COMORBIDITY AND BODY MASS INDEX (BMI) OF 39.0 TO 39.9 IN ADULT: Chronic | ICD-10-CM

## 2025-02-24 DIAGNOSIS — I10 PRIMARY HYPERTENSION: Primary | Chronic | ICD-10-CM

## 2025-02-24 PROCEDURE — 99214 OFFICE O/P EST MOD 30 MIN: CPT | Performed by: STUDENT IN AN ORGANIZED HEALTH CARE EDUCATION/TRAINING PROGRAM

## 2025-02-24 PROCEDURE — G2211 COMPLEX E/M VISIT ADD ON: HCPCS | Performed by: STUDENT IN AN ORGANIZED HEALTH CARE EDUCATION/TRAINING PROGRAM

## 2025-02-24 PROCEDURE — G8417 CALC BMI ABV UP PARAM F/U: HCPCS | Performed by: STUDENT IN AN ORGANIZED HEALTH CARE EDUCATION/TRAINING PROGRAM

## 2025-02-24 PROCEDURE — 1036F TOBACCO NON-USER: CPT | Performed by: STUDENT IN AN ORGANIZED HEALTH CARE EDUCATION/TRAINING PROGRAM

## 2025-02-24 PROCEDURE — 3079F DIAST BP 80-89 MM HG: CPT | Performed by: STUDENT IN AN ORGANIZED HEALTH CARE EDUCATION/TRAINING PROGRAM

## 2025-02-24 PROCEDURE — 3074F SYST BP LT 130 MM HG: CPT | Performed by: STUDENT IN AN ORGANIZED HEALTH CARE EDUCATION/TRAINING PROGRAM

## 2025-02-24 PROCEDURE — G8427 DOCREV CUR MEDS BY ELIG CLIN: HCPCS | Performed by: STUDENT IN AN ORGANIZED HEALTH CARE EDUCATION/TRAINING PROGRAM

## 2025-02-24 RX ORDER — LISDEXAMFETAMINE DIMESYLATE 30 MG/1
30 CAPSULE ORAL DAILY
COMMUNITY
Start: 2025-02-14

## 2025-02-24 ASSESSMENT — ENCOUNTER SYMPTOMS
SHORTNESS OF BREATH: 0
WHEEZING: 0
NAUSEA: 0

## 2025-02-24 NOTE — PROGRESS NOTES
[facility]  2025    Kisha Acuna (:  1985) is a 39 y.o. female, here for evaluation of the following medical concerns:    Chief Complaint   Patient presents with    Blood Pressure Check        ASSESSMENT/ PLAN  1. Primary hypertension  Controlled, continue current medication regimen    2. Class 2 severe obesity due to excess calories with serious comorbidity and body mass index (BMI) of 39.0 to 39.9 in adult  Complicates all aspects of patient's care       Return in about 6 months (around 2025) for annual physical.    HPI  Patient presents today for follow-up on hypertension.    She is currently taking and tolerating metoprolol, amlodipine, Dyazide daily as prescribed with good control of her blood pressure.  She denies side effects of medication.    ROS  Review of Systems   Constitutional:  Negative for activity change and unexpected weight change.   HENT:  Negative for tinnitus.    Eyes:  Negative for visual disturbance.   Respiratory:  Negative for shortness of breath and wheezing.    Cardiovascular:  Negative for chest pain, palpitations and leg swelling.   Gastrointestinal:  Negative for nausea.   Genitourinary:  Negative for decreased urine volume.   Neurological:  Negative for syncope, light-headedness and headaches.       HISTORIES  Current Outpatient Medications on File Prior to Visit   Medication Sig Dispense Refill    VYVANSE 30 MG capsule Take 1 capsule by mouth daily.      meloxicam (MOBIC) 15 MG tablet TAKE 1 TABLET BY MOUTH AT BEDTIME 30 tablet 5    folic acid (FOLVITE) 1 MG tablet Take 1 tablet by mouth daily      amLODIPine (NORVASC) 5 MG tablet TAKE 1 TABLET BY MOUTH AT BEDTIME 90 tablet 1    vitamin D (VITAMIN D3) 25 MCG (1000 UT) TABS tablet TAKE 1 TABLET BY MOUTH EVERY DAY 90 tablet 1    lurasidone (LATUDA) 40 MG TABS tablet       triamterene-hydroCHLOROthiazide (DYAZIDE) 37.5-25 MG per capsule TAKE 1 CAPSULE BY MOUTH EVERY DAY 90 capsule 1    metoprolol

## 2025-03-03 DIAGNOSIS — E55.9 VITAMIN D DEFICIENCY: ICD-10-CM

## 2025-03-03 RX ORDER — ERGOCALCIFEROL 1.25 MG/1
50000 CAPSULE, LIQUID FILLED ORAL WEEKLY
Qty: 12 CAPSULE | Refills: 0 | Status: SHIPPED | OUTPATIENT
Start: 2025-03-03

## 2025-03-03 NOTE — TELEPHONE ENCOUNTER
Medication:   Requested Prescriptions     Pending Prescriptions Disp Refills    vitamin D (ERGOCALCIFEROL) 1.25 MG (41441 UT) CAPS capsule [Pharmacy Med Name: Vitamin D (Ergocalciferol) Oral Capsule 1.25 MG (74019 UT)] 12 capsule 0     Sig: Take 1 capsule by mouth once a week        Last Filled:  9/12/2024    Patient Phone Number: [phone] (home)     Last appt: 2/24/2025   Next appt: 8/25/2025          Return in about 6 months (around 8/24/2025) for annual physical.

## 2025-04-02 PROBLEM — G47.33 OBSTRUCTIVE SLEEP APNEA SYNDROME: Status: ACTIVE | Noted: 2025-04-02

## 2025-04-03 ENCOUNTER — OFFICE VISIT (OUTPATIENT)
Dept: [facility] CLINIC | Age: 40
End: 2025-04-03
Payer: COMMERCIAL

## 2025-04-03 VITALS
HEIGHT: 65 IN | SYSTOLIC BLOOD PRESSURE: 147 MMHG | OXYGEN SATURATION: 99 % | HEART RATE: 102 BPM | TEMPERATURE: 97.6 F | WEIGHT: 226 LBS | DIASTOLIC BLOOD PRESSURE: 94 MMHG | BODY MASS INDEX: 37.65 KG/M2

## 2025-04-03 DIAGNOSIS — R21 RASH: Primary | ICD-10-CM

## 2025-04-03 PROCEDURE — G8427 DOCREV CUR MEDS BY ELIG CLIN: HCPCS | Performed by: STUDENT IN AN ORGANIZED HEALTH CARE EDUCATION/TRAINING PROGRAM

## 2025-04-03 PROCEDURE — G2211 COMPLEX E/M VISIT ADD ON: HCPCS | Performed by: STUDENT IN AN ORGANIZED HEALTH CARE EDUCATION/TRAINING PROGRAM

## 2025-04-03 PROCEDURE — 99214 OFFICE O/P EST MOD 30 MIN: CPT | Performed by: STUDENT IN AN ORGANIZED HEALTH CARE EDUCATION/TRAINING PROGRAM

## 2025-04-03 PROCEDURE — G8417 CALC BMI ABV UP PARAM F/U: HCPCS | Performed by: STUDENT IN AN ORGANIZED HEALTH CARE EDUCATION/TRAINING PROGRAM

## 2025-04-03 PROCEDURE — 3077F SYST BP >= 140 MM HG: CPT | Performed by: STUDENT IN AN ORGANIZED HEALTH CARE EDUCATION/TRAINING PROGRAM

## 2025-04-03 PROCEDURE — 3080F DIAST BP >= 90 MM HG: CPT | Performed by: STUDENT IN AN ORGANIZED HEALTH CARE EDUCATION/TRAINING PROGRAM

## 2025-04-03 PROCEDURE — 1036F TOBACCO NON-USER: CPT | Performed by: STUDENT IN AN ORGANIZED HEALTH CARE EDUCATION/TRAINING PROGRAM

## 2025-04-03 RX ORDER — HYDROXYZINE HYDROCHLORIDE 25 MG/1
25 TABLET, FILM COATED ORAL EVERY 8 HOURS PRN
Qty: 30 TABLET | Refills: 0 | Status: SHIPPED | OUTPATIENT
Start: 2025-04-03 | End: 2025-04-13

## 2025-04-03 RX ORDER — PREDNISONE 10 MG/1
TABLET ORAL
Qty: 30 TABLET | Refills: 0 | Status: SHIPPED | OUTPATIENT
Start: 2025-04-03 | End: 2025-04-13

## 2025-04-03 ASSESSMENT — ENCOUNTER SYMPTOMS
COUGH: 0
SHORTNESS OF BREATH: 0
WHEEZING: 0

## 2025-04-03 NOTE — PROGRESS NOTES
[facility]  4/3/2025    Kisha Acuna (:  1985) is a 40 y.o. female, here for evaluation of the following medical concerns:    Chief Complaint   Patient presents with    Rash     Started yesterday afternoon. All over her body. Gets hives sometimes due to her autoimmune issues        ASSESSMENT/ PLAN  1. Rash  - Suspect allergic cause, denies any new detergents, shampoos, washes, lotions, meds. Not much improvement with benadryl yesterday, but rash somewhat improved today  - Begin meds as below, strict return precautions discussed  - Rash does extend to clothing lines and taper off, did discuss switching laundry detergent to free and clear un scented   - predniSONE (DELTASONE) 10 MG tablet; 4 po qd for 3 days, then 3 po qd for 3 days, then 2 po qd for 3 days, then 1 po qd for 3 days  Dispense: 30 tablet; Refill: 0  - hydrOXYzine HCl (ATARAX) 25 MG tablet; Take 1 tablet by mouth every 8 hours as needed for Itching Caution: can cause drowsiness  Dispense: 30 tablet; Refill: 0       Return if symptoms worsen or fail to improve.    HPI  Here for acute concerns of rash.    Had a rash breaking out yesterday, no episodes like this previously. Does state has a history of autoimmune issues and will get random hives. Took benadryl with no noticeable relief. Denies any new body washes, detergents, shampoos, or foods. No new medicines. Very itchy. Rash feels hot to the touch.     ROS  Review of Systems   Respiratory:  Negative for cough, shortness of breath and wheezing.    Cardiovascular:  Negative for chest pain and palpitations.   Skin:  Positive for rash.       HISTORIES  Current Outpatient Medications on File Prior to Visit   Medication Sig Dispense Refill    vitamin D (ERGOCALCIFEROL) 1.25 MG (08795 UT) CAPS capsule Take 1 capsule by mouth once a week 12 capsule 0    VYVANSE 30 MG capsule Take 1 capsule by mouth daily.      meloxicam (MOBIC) 15 MG tablet TAKE 1 TABLET BY MOUTH AT BEDTIME 30 tablet 5

## 2025-04-14 DIAGNOSIS — I10 ESSENTIAL HYPERTENSION: ICD-10-CM

## 2025-04-14 RX ORDER — METOPROLOL SUCCINATE 25 MG/1
25 TABLET, EXTENDED RELEASE ORAL DAILY
Qty: 30 TABLET | Refills: 5 | Status: SHIPPED | OUTPATIENT
Start: 2025-04-14

## 2025-04-14 NOTE — TELEPHONE ENCOUNTER
Medication:   Requested Prescriptions     Pending Prescriptions Disp Refills    metoprolol succinate (TOPROL XL) 25 MG extended release tablet [Pharmacy Med Name: Metoprolol Succinate ER Oral Tablet Extended Release 24 Hour 25 MG] 30 tablet 0     Sig: TAKE 1 TABLET BY MOUTH EVERY DAY        Last Filled:  10/17/24    Patient Phone Number: [phone] (home)     Last appt: 4/3/2025   Next appt: 8/25/2025          Return in about 6 months (around 8/24/2025) for annual physical.      Resident

## 2025-04-26 ENCOUNTER — HOSPITAL ENCOUNTER (OUTPATIENT)
Dept: SLEEP CENTER | Age: 40
Discharge: HOME | End: 2025-04-26

## 2025-04-26 DIAGNOSIS — G47.33 OBSTRUCTIVE SLEEP APNEA SYNDROME: ICD-10-CM

## 2025-04-30 ENCOUNTER — TELEPHONE (OUTPATIENT)
Dept: PULMONOLOGY | Age: 40
End: 2025-04-30

## 2025-04-30 NOTE — TELEPHONE ENCOUNTER
Spoke with pt to review titration study results.  Order to be sent to [facility]. Call transferred for pt to schedule f/u.

## 2025-04-30 NOTE — PROGRESS NOTES
Kisha Felixton         : 1985  [facility]    Diagnosis: [x] SUSI (G47.33) [] CSA (G47.31) [] Apnea (G47.30)   Length of Need: [x] 18 Months [] 99 Months [] Other:    Machine (CASSIDY!): [] Respironics Dream Station   2   Auto [x] ResMed AirSense/AirCurve     Auto S11 or S10  [] Other:     []  CPAP () [x] Bilevel ()   Mode: [] Auto [] Spontaneous    Mode: [x] Auto [] Spontaneous       IPAP max 25 cmH2O  EPAP min 11 cmH2O  PS 4 cmH2O     Comfort Settings:   - Ramp Pressure: 5 cmH2O                                        - Ramp time: 15 min                                     -  Flex/EPR - 3 full time                                    - For ResMed Bilevel (TiMax-4 sec   TiMin- 0.2 sec)     Humidifier: [x] Heated ()        [x] Water chamber replacement ()/ 1 per 6 months        Mask:   [x] Nasal () /1 per 3 months [x] Full Face () /1 per 3 months   [x] Patient choice -Size and fit mask [x] Patient Choice - Size and fit mask   [] Dispense:  [] Dispense:    [x] Headgear () / 1 per 3 months [x] Headgear () / 1 per 3 months   [x] Replacement Nasal Cushion ()/2 per month [x] Interface Replacement ()/1 per month   [x] Replacement Nasal Pillows ()/2 per month         Tubing: [x] Heated ()/1 per 3 months    [] Standard ()/1 per 3 months [] Other:           Filters: [x] Non-disposable ()/1 per 6 months     [x] Ultra-Fine, Disposable ()/2 per month        Miscellaneous: [x] Chin Strap ()/ 1 per 6 months [] O2 bleed-in:       LPM   [] Oximetry on CPAP/Bilevel []  Other:    [x] Modem: ()         Start Order Date: 25    MEDICAL JUSTIFICATION:  I, the undersigned, certify that the above prescribed supplies are medically necessary for this patient’s wellbeing.  In my opinion, the supplies are both reasonable and necessary in reference to accepted standards of medicalpractice in treatment of this patient’s condition.    SEAN GORDON

## 2025-05-08 ENCOUNTER — OFFICE VISIT (OUTPATIENT)
Dept: [facility] CLINIC | Age: 40
End: 2025-05-08
Payer: COMMERCIAL

## 2025-05-08 VITALS
WEIGHT: 224.4 LBS | DIASTOLIC BLOOD PRESSURE: 87 MMHG | HEART RATE: 98 BPM | TEMPERATURE: 97.3 F | HEIGHT: 65 IN | SYSTOLIC BLOOD PRESSURE: 142 MMHG | BODY MASS INDEX: 37.39 KG/M2

## 2025-05-08 DIAGNOSIS — R10.2 SUPRAPUBIC PAIN: Primary | ICD-10-CM

## 2025-05-08 LAB
BILIRUBIN, POC: ABNORMAL
BLOOD URINE, POC: ABNORMAL
CLARITY, POC: CLEAR
COLOR, POC: YELLOW
GLUCOSE URINE, POC: ABNORMAL MG/DL
KETONES, POC: ABNORMAL MG/DL
LEUKOCYTE EST, POC: ABNORMAL
NITRITE, POC: ABNORMAL
PH, POC: 7
PROTEIN, POC: ABNORMAL MG/DL
SPECIFIC GRAVITY, POC: 1.01
UROBILINOGEN, POC: 0.2 MG/DL

## 2025-05-08 PROCEDURE — 3079F DIAST BP 80-89 MM HG: CPT | Performed by: STUDENT IN AN ORGANIZED HEALTH CARE EDUCATION/TRAINING PROGRAM

## 2025-05-08 PROCEDURE — G8417 CALC BMI ABV UP PARAM F/U: HCPCS | Performed by: STUDENT IN AN ORGANIZED HEALTH CARE EDUCATION/TRAINING PROGRAM

## 2025-05-08 PROCEDURE — 99214 OFFICE O/P EST MOD 30 MIN: CPT | Performed by: STUDENT IN AN ORGANIZED HEALTH CARE EDUCATION/TRAINING PROGRAM

## 2025-05-08 PROCEDURE — 81002 URINALYSIS NONAUTO W/O SCOPE: CPT | Performed by: STUDENT IN AN ORGANIZED HEALTH CARE EDUCATION/TRAINING PROGRAM

## 2025-05-08 PROCEDURE — G8427 DOCREV CUR MEDS BY ELIG CLIN: HCPCS | Performed by: STUDENT IN AN ORGANIZED HEALTH CARE EDUCATION/TRAINING PROGRAM

## 2025-05-08 PROCEDURE — 3077F SYST BP >= 140 MM HG: CPT | Performed by: STUDENT IN AN ORGANIZED HEALTH CARE EDUCATION/TRAINING PROGRAM

## 2025-05-08 PROCEDURE — 1036F TOBACCO NON-USER: CPT | Performed by: STUDENT IN AN ORGANIZED HEALTH CARE EDUCATION/TRAINING PROGRAM

## 2025-05-08 RX ORDER — CEPHALEXIN 500 MG/1
500 CAPSULE ORAL 2 TIMES DAILY
Qty: 10 CAPSULE | Refills: 0 | Status: SHIPPED | OUTPATIENT
Start: 2025-05-08 | End: 2025-05-13

## 2025-05-08 ASSESSMENT — ENCOUNTER SYMPTOMS
ABDOMINAL DISTENTION: 0
BLOOD IN STOOL: 0
CONSTIPATION: 0
VOMITING: 0
NAUSEA: 0
ABDOMINAL PAIN: 1
DIARRHEA: 0

## 2025-05-08 NOTE — PROGRESS NOTES
[facility]  2025    Kisha Acuna (:  1985) is a 40 y.o. female, here for evaluation of the following medical concerns:    Chief Complaint   Patient presents with    Abdominal Pain        ASSESSMENT/ PLAN  1. Suprapubic pain  Uncontrolled, this is a new problem.  POC UA positive for leuks, blood.  Will treat for UTI today and send for culture.  - Urinalysis  - POCT Urinalysis no Micro  - cephALEXin (KEFLEX) 500 MG capsule; Take 1 capsule by mouth 2 times daily for 5 days  Dispense: 10 capsule; Refill: 0     2.  Hypertension  Uncontrolled, likely contributing factor is pain-per above.  Continue current antihypertensive medication regimen and recheck in 1 week    Return in about 1 week (around 5/15/2025) for blood pressure.    HPI  Patient presents today for concerns of suprapubic pain.    Has been present for 2 days, worse when her bladder is full.  Sometimes exacerbated by eating, but states that this is more so related to her abdomen feeling full.  No epigastric pain, acidic taste in her mouth or history of dyspepsia.  No radiation of the pain into her pelvis, flank or low back.  No diarrhea, constipation, dysuria or hematuria.  She is sexually active, monogamous with her .  No abnormal vaginal discharge or odor.  No fever, chills.    ROS  Review of Systems   Constitutional:  Negative for activity change, appetite change, fatigue, fever and unexpected weight change.   Cardiovascular:  Negative for leg swelling.   Gastrointestinal:  Positive for abdominal pain. Negative for abdominal distention, blood in stool, constipation, diarrhea, nausea and vomiting.   Genitourinary:  Negative for dysuria and flank pain.   Musculoskeletal:  Negative for myalgias.   Allergic/Immunologic: Negative for food allergies.   Psychiatric/Behavioral:  The patient is not nervous/anxious.        HISTORIES  Current Outpatient Medications on File Prior to Visit   Medication Sig Dispense Refill    metoprolol

## 2025-05-10 LAB — BACTERIA UR CULT: NORMAL

## 2025-05-12 ENCOUNTER — RESULTS FOLLOW-UP (OUTPATIENT)
Dept: [facility] CLINIC | Age: 40
End: 2025-05-12

## 2025-05-15 ENCOUNTER — OFFICE VISIT (OUTPATIENT)
Dept: [facility] CLINIC | Age: 40
End: 2025-05-15
Payer: COMMERCIAL

## 2025-05-15 VITALS
WEIGHT: 225.2 LBS | BODY MASS INDEX: 37.52 KG/M2 | HEART RATE: 83 BPM | DIASTOLIC BLOOD PRESSURE: 81 MMHG | HEIGHT: 65 IN | SYSTOLIC BLOOD PRESSURE: 130 MMHG | TEMPERATURE: 97.2 F

## 2025-05-15 DIAGNOSIS — Z23 NEED FOR VACCINATION: ICD-10-CM

## 2025-05-15 DIAGNOSIS — I10 PRIMARY HYPERTENSION: Chronic | ICD-10-CM

## 2025-05-15 DIAGNOSIS — R31.21 ASYMPTOMATIC MICROSCOPIC HEMATURIA: Primary | ICD-10-CM

## 2025-05-15 PROCEDURE — 3079F DIAST BP 80-89 MM HG: CPT | Performed by: STUDENT IN AN ORGANIZED HEALTH CARE EDUCATION/TRAINING PROGRAM

## 2025-05-15 PROCEDURE — G8427 DOCREV CUR MEDS BY ELIG CLIN: HCPCS | Performed by: STUDENT IN AN ORGANIZED HEALTH CARE EDUCATION/TRAINING PROGRAM

## 2025-05-15 PROCEDURE — G8417 CALC BMI ABV UP PARAM F/U: HCPCS | Performed by: STUDENT IN AN ORGANIZED HEALTH CARE EDUCATION/TRAINING PROGRAM

## 2025-05-15 PROCEDURE — 90677 PCV20 VACCINE IM: CPT | Performed by: STUDENT IN AN ORGANIZED HEALTH CARE EDUCATION/TRAINING PROGRAM

## 2025-05-15 PROCEDURE — 99214 OFFICE O/P EST MOD 30 MIN: CPT | Performed by: STUDENT IN AN ORGANIZED HEALTH CARE EDUCATION/TRAINING PROGRAM

## 2025-05-15 PROCEDURE — 90746 HEPB VACCINE 3 DOSE ADULT IM: CPT | Performed by: STUDENT IN AN ORGANIZED HEALTH CARE EDUCATION/TRAINING PROGRAM

## 2025-05-15 PROCEDURE — 90472 IMMUNIZATION ADMIN EACH ADD: CPT | Performed by: STUDENT IN AN ORGANIZED HEALTH CARE EDUCATION/TRAINING PROGRAM

## 2025-05-15 PROCEDURE — 3075F SYST BP GE 130 - 139MM HG: CPT | Performed by: STUDENT IN AN ORGANIZED HEALTH CARE EDUCATION/TRAINING PROGRAM

## 2025-05-15 PROCEDURE — 1036F TOBACCO NON-USER: CPT | Performed by: STUDENT IN AN ORGANIZED HEALTH CARE EDUCATION/TRAINING PROGRAM

## 2025-05-15 ASSESSMENT — ENCOUNTER SYMPTOMS
NAUSEA: 0
WHEEZING: 0
SHORTNESS OF BREATH: 0

## 2025-06-06 NOTE — PROGRESS NOTES
[facility]  5/15/2025    Kisha Acuna (:  1985) is a 40 y.o. female, here for evaluation of the following medical concerns:    Chief Complaint   Patient presents with    Blood Pressure Check        ASSESSMENT/ PLAN  1. Asymptomatic microscopic hematuria  Uncontrolled, this is a new problem.  Referral placed to urology for cystoscopy  - [facility] Milly Nicholson MD, Urology, [facility]    2. Primary hypertension  Controlled, continue current medication regimen    3. Need for vaccination  - Pneumococcal, PCV20, PREVNAR 20, (age 6w+), IM, PF  - Hep B, ENGERIX-B, (age 20 yrs+), IM, 1mL, 3-dose       Return if symptoms worsen or fail to improve.    HPI  Patient presents today for follow-up on abdominal pressure, hypertension.    At previous appointment, patient reported symptoms which seemed to be consistent with UTI.  She was empirically treated with antibiotics, but subsequent urine culture did not grow bacteria.  Since then, abdominal symptoms have resolved.  UA did show small blood, and after review of previous urinalyses this has been present for years.  No gross hematuria, flank pain.  Never smoker.  Not on her menstrual cycle and no vigorous exercise.    Blood pressure has normalized.  She is currently taking Dyazide, metoprolol and amlodipine.    ROS  Review of Systems   Constitutional:  Negative for activity change and unexpected weight change.   HENT:  Negative for tinnitus.    Eyes:  Negative for visual disturbance.   Respiratory:  Negative for shortness of breath and wheezing.    Cardiovascular:  Negative for chest pain, palpitations and leg swelling.   Gastrointestinal:  Negative for nausea.   Genitourinary:  Negative for decreased urine volume.   Neurological:  Negative for syncope, light-headedness and headaches.       HISTORIES  Current Outpatient Medications on File Prior to Visit   Medication Sig Dispense Refill    metoprolol succinate (TOPROL XL) 25 MG extended release  950cc's of serous fluid removed during procedure. VSS, NADN, and pt tolerated procedure well. Band aid to right mid back puncture site C/D/I. Pt transported back to room via transporter.

## 2025-06-10 DIAGNOSIS — J45.30 MILD PERSISTENT ASTHMA, UNCOMPLICATED: ICD-10-CM

## 2025-06-10 RX ORDER — ALBUTEROL SULFATE 90 UG/1
2 INHALANT RESPIRATORY (INHALATION) EVERY 6 HOURS PRN
Qty: 8.5 G | Refills: 2 | Status: SHIPPED | OUTPATIENT
Start: 2025-06-10

## 2025-06-10 NOTE — TELEPHONE ENCOUNTER
Medication:   Requested Prescriptions     Pending Prescriptions Disp Refills    albuterol sulfate HFA (PROVENTIL;VENTOLIN;PROAIR) 108 (90 Base) MCG/ACT inhaler [Pharmacy Med Name: Albuterol Sulfate HFA Inhalation Aerosol Solution 108 (90 Base) MCG/ACT] 8.5 g 0     Sig: INHALE 2 PUFFS BY MOUTH EVERY 6 HOURS AS NEEDED for wheezing        Last Filled:  9/1/2024    Patient Phone Number: [phone] (home)     Last appt: 5/15/2025   Next appt: 8/25/2025

## 2025-06-23 DIAGNOSIS — I10 ESSENTIAL HYPERTENSION: ICD-10-CM

## 2025-06-24 RX ORDER — AMLODIPINE BESYLATE 5 MG/1
5 TABLET ORAL NIGHTLY
Qty: 90 TABLET | Refills: 1 | Status: SHIPPED | OUTPATIENT
Start: 2025-06-24

## 2025-06-24 RX ORDER — TRIAMTERENE AND HYDROCHLOROTHIAZIDE 37.5; 25 MG/1; MG/1
CAPSULE ORAL DAILY
Qty: 90 CAPSULE | Refills: 1 | Status: SHIPPED | OUTPATIENT
Start: 2025-06-24

## 2025-06-24 NOTE — TELEPHONE ENCOUNTER
Medication:   Requested Prescriptions     Pending Prescriptions Disp Refills    amLODIPine (NORVASC) 5 MG tablet [Pharmacy Med Name: amLODIPine Besylate Oral Tablet 5 MG] 90 tablet 0     Sig: TAKE 1 TABLET BY MOUTH AT BEDTIME    triamterene-hydroCHLOROthiazide (DYAZIDE) 37.5-25 MG per capsule [Pharmacy Med Name: Triamterene-HCTZ Oral Capsule 37.5-25 MG] 90 capsule 0     Sig: TAKE 1 CAPSULE BY MOUTH EVERY DAY        Last Filled:  12/02/2024, 12/31/2024     Patient Phone Number: [phone] (home)     Last appt: 5/15/2025   Next appt: 8/25/2025        Return in about 6 months (around 8/24/2025) for annual physical

## 2025-06-30 ENCOUNTER — OFFICE VISIT (OUTPATIENT)
Dept: [facility] CLINIC | Age: 40
End: 2025-06-30
Payer: COMMERCIAL

## 2025-06-30 VITALS
OXYGEN SATURATION: 100 % | WEIGHT: 225 LBS | HEIGHT: 65 IN | DIASTOLIC BLOOD PRESSURE: 87 MMHG | HEART RATE: 86 BPM | BODY MASS INDEX: 37.49 KG/M2 | SYSTOLIC BLOOD PRESSURE: 139 MMHG | TEMPERATURE: 97.2 F

## 2025-06-30 DIAGNOSIS — R22.32 MASS OF LEFT WRIST: Primary | ICD-10-CM

## 2025-06-30 PROCEDURE — 1036F TOBACCO NON-USER: CPT | Performed by: STUDENT IN AN ORGANIZED HEALTH CARE EDUCATION/TRAINING PROGRAM

## 2025-06-30 PROCEDURE — 3079F DIAST BP 80-89 MM HG: CPT | Performed by: STUDENT IN AN ORGANIZED HEALTH CARE EDUCATION/TRAINING PROGRAM

## 2025-06-30 PROCEDURE — 3075F SYST BP GE 130 - 139MM HG: CPT | Performed by: STUDENT IN AN ORGANIZED HEALTH CARE EDUCATION/TRAINING PROGRAM

## 2025-06-30 PROCEDURE — G8427 DOCREV CUR MEDS BY ELIG CLIN: HCPCS | Performed by: STUDENT IN AN ORGANIZED HEALTH CARE EDUCATION/TRAINING PROGRAM

## 2025-06-30 PROCEDURE — 99214 OFFICE O/P EST MOD 30 MIN: CPT | Performed by: STUDENT IN AN ORGANIZED HEALTH CARE EDUCATION/TRAINING PROGRAM

## 2025-06-30 PROCEDURE — G8417 CALC BMI ABV UP PARAM F/U: HCPCS | Performed by: STUDENT IN AN ORGANIZED HEALTH CARE EDUCATION/TRAINING PROGRAM

## 2025-06-30 ASSESSMENT — ENCOUNTER SYMPTOMS
WHEEZING: 0
SHORTNESS OF BREATH: 0
NAUSEA: 0

## 2025-06-30 NOTE — PROGRESS NOTES
[facility]  2025    Kisha Acuna (:  1985) is a 40 y.o. female, here for evaluation of the following medical concerns:    Chief Complaint   Patient presents with    grown on left wrist      Noticed since May        ASSESSMENT/ PLAN  1. Mass of left wrist  Uncontrolled, this is new problem.  Differential includes ganglion cyst, other mass.  No other symptoms consistent with rheumatoid nodule.  Check ultrasound to further characterize  - US SOFT TISSUE LIMITED AREA; Future       Return if symptoms worsen or fail to improve.    HPI  Patient presents today for concerns of swelling on her left wrist, nondominant hand.    Notes development of a nodule like mass on the medial aspect of her left wrist.  Notes that it has grown in size in the past couple weeks.  It becomes tender when she \"messes with it.\"  No wrist pain or stiffness.  No skin changes.    ROS  Review of Systems   Constitutional:  Negative for activity change and unexpected weight change.   HENT:  Negative for tinnitus.    Eyes:  Negative for visual disturbance.   Respiratory:  Negative for shortness of breath and wheezing.    Cardiovascular:  Negative for chest pain, palpitations and leg swelling.   Gastrointestinal:  Negative for nausea.   Genitourinary:  Negative for decreased urine volume.   Neurological:  Negative for syncope, light-headedness and headaches.       HISTORIES  Current Outpatient Medications on File Prior to Visit   Medication Sig Dispense Refill    amLODIPine (NORVASC) 5 MG tablet TAKE 1 TABLET BY MOUTH AT BEDTIME 90 tablet 1    triamterene-hydroCHLOROthiazide (DYAZIDE) 37.5-25 MG per capsule TAKE 1 CAPSULE BY MOUTH EVERY DAY 90 capsule 1    albuterol sulfate HFA (PROVENTIL;VENTOLIN;PROAIR) 108 (90 Base) MCG/ACT inhaler INHALE 2 PUFFS BY MOUTH EVERY 6 HOURS AS NEEDED for wheezing 8.5 g 2    metoprolol succinate (TOPROL XL) 25 MG extended release tablet TAKE 1 TABLET BY MOUTH EVERY DAY 30 tablet 5    vitamin D

## 2025-07-01 ENCOUNTER — HOSPITAL ENCOUNTER (OUTPATIENT)
Dept: ULTRASOUND IMAGING | Age: 40
Discharge: HOME | End: 2025-07-01
Payer: COMMERCIAL

## 2025-07-01 DIAGNOSIS — R22.32 MASS OF LEFT WRIST: ICD-10-CM

## 2025-07-01 PROCEDURE — 76999 ECHO EXAMINATION PROCEDURE: CPT

## 2025-07-02 ENCOUNTER — RESULTS FOLLOW-UP (OUTPATIENT)
Dept: [facility] CLINIC | Age: 40
End: 2025-07-02

## 2025-07-15 ENCOUNTER — OFFICE VISIT (OUTPATIENT)
Dept: GYNECOLOGY | Age: 40
End: 2025-07-15
Payer: COMMERCIAL

## 2025-07-15 VITALS
HEART RATE: 84 BPM | BODY MASS INDEX: 37.55 KG/M2 | DIASTOLIC BLOOD PRESSURE: 76 MMHG | RESPIRATION RATE: 17 BRPM | OXYGEN SATURATION: 99 % | WEIGHT: 225.4 LBS | HEIGHT: 65 IN | SYSTOLIC BLOOD PRESSURE: 130 MMHG

## 2025-07-15 DIAGNOSIS — Z01.419 WELL WOMAN EXAM WITH ROUTINE GYNECOLOGICAL EXAM: Primary | ICD-10-CM

## 2025-07-15 PROCEDURE — 3078F DIAST BP <80 MM HG: CPT | Performed by: OBSTETRICS & GYNECOLOGY

## 2025-07-15 PROCEDURE — 3075F SYST BP GE 130 - 139MM HG: CPT | Performed by: OBSTETRICS & GYNECOLOGY

## 2025-07-15 PROCEDURE — 99395 PREV VISIT EST AGE 18-39: CPT | Performed by: OBSTETRICS & GYNECOLOGY

## 2025-07-16 ASSESSMENT — ENCOUNTER SYMPTOMS
GASTROINTESTINAL NEGATIVE: 1
RESPIRATORY NEGATIVE: 1
EYES NEGATIVE: 1
ALLERGIC/IMMUNOLOGIC NEGATIVE: 1

## 2025-07-17 NOTE — PROGRESS NOTES
Subjective   Patient ID: Kisha Acuna is a 40 y.o. female.    Patient is here for annual.     Gynecologic Exam        Review of Systems   Constitutional: Negative.    HENT: Negative.     Eyes: Negative.    Respiratory: Negative.     Cardiovascular: Negative.    Gastrointestinal: Negative.    Endocrine: Negative.    Genitourinary: Negative.    Musculoskeletal: Negative.    Skin: Negative.    Allergic/Immunologic: Negative.    Neurological: Negative.    Hematological: Negative.    Psychiatric/Behavioral: Negative.       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    Headache     Hearing loss     left ear    Hyperlipidemia     Hypertension     Kidney disease     Meniere's disease     Obstructive sleep apnea syndrome 4/2/2025    Psychosomatic seizure     Rash     Recurrent upper respiratory infection (URI)     Seizure ([facility])     none since 2013    Tinnitus     Trimalleolar fracture of ankle, closed, right, initial encounter 02/27/2018    Viral hepatitis      Past Surgical History:   Procedure Laterality Date    ANKLE FRACTURE SURGERY Right 03/02/2018    orif trimalleolar -rods/pins    CHOLECYSTECTOMY, LAPAROSCOPIC N/A 12/13/2022    ROBOTIC CHOLECYSTECTOMY performed by Kraig Dougherty DO at [facility] OR    SINUS ENDOSCOPY Bilateral 09/18/2023    EXCISION KENIA BULLOSA LEFT, BILATERAL INFERIOR TURBINATE REDUCTION, BILATERAL MAXILLARY ANTROSTOMY, BILATERAL TOTAL ETHMOIDECTOMY, AND SEPTOPLASTY performed by Eleuterio Briggs DO at [facility] OR    SINUS SURGERY      TONSILLECTOMY

## 2025-07-23 PROBLEM — G47.33 OBSTRUCTIVE SLEEP APNEA SYNDROME: Chronic | Status: ACTIVE | Noted: 2025-04-02

## 2025-08-10 DIAGNOSIS — M25.511 ACUTE PAIN OF RIGHT SHOULDER: ICD-10-CM

## 2025-08-11 RX ORDER — MELOXICAM 15 MG/1
15 TABLET ORAL NIGHTLY
Qty: 30 TABLET | Refills: 0 | Status: SHIPPED | OUTPATIENT
Start: 2025-08-11

## 2025-08-13 DIAGNOSIS — E55.9 VITAMIN D DEFICIENCY: ICD-10-CM

## 2025-08-13 RX ORDER — ERGOCALCIFEROL 1.25 MG/1
50000 CAPSULE, LIQUID FILLED ORAL WEEKLY
Qty: 12 CAPSULE | Refills: 0 | Status: SHIPPED | OUTPATIENT
Start: 2025-08-13 | End: 2025-08-14 | Stop reason: SDUPTHER

## 2025-08-14 DIAGNOSIS — E55.9 VITAMIN D DEFICIENCY: ICD-10-CM

## 2025-08-14 RX ORDER — ERGOCALCIFEROL 1.25 MG/1
50000 CAPSULE, LIQUID FILLED ORAL WEEKLY
Qty: 12 CAPSULE | Refills: 3 | Status: SHIPPED | OUTPATIENT
Start: 2025-08-14

## 2025-08-25 ENCOUNTER — OFFICE VISIT (OUTPATIENT)
Dept: [facility] CLINIC | Age: 40
End: 2025-08-25
Payer: COMMERCIAL

## 2025-08-25 VITALS
HEART RATE: 101 BPM | SYSTOLIC BLOOD PRESSURE: 132 MMHG | HEIGHT: 65 IN | OXYGEN SATURATION: 99 % | TEMPERATURE: 98.1 F | BODY MASS INDEX: 36.75 KG/M2 | DIASTOLIC BLOOD PRESSURE: 89 MMHG | WEIGHT: 220.6 LBS

## 2025-08-25 DIAGNOSIS — Z00.00 ANNUAL PHYSICAL EXAM: Primary | ICD-10-CM

## 2025-08-25 DIAGNOSIS — I10 PRIMARY HYPERTENSION: Chronic | ICD-10-CM

## 2025-08-25 DIAGNOSIS — E66.01 CLASS 2 SEVERE OBESITY DUE TO EXCESS CALORIES WITH SERIOUS COMORBIDITY AND BODY MASS INDEX (BMI) OF 37.0 TO 37.9 IN ADULT (HCC): Chronic | ICD-10-CM

## 2025-08-25 DIAGNOSIS — E66.812 CLASS 2 SEVERE OBESITY DUE TO EXCESS CALORIES WITH SERIOUS COMORBIDITY AND BODY MASS INDEX (BMI) OF 37.0 TO 37.9 IN ADULT (HCC): Chronic | ICD-10-CM

## 2025-08-25 DIAGNOSIS — G47.33 OBSTRUCTIVE SLEEP APNEA SYNDROME: Chronic | ICD-10-CM

## 2025-08-25 PROCEDURE — 3075F SYST BP GE 130 - 139MM HG: CPT | Performed by: STUDENT IN AN ORGANIZED HEALTH CARE EDUCATION/TRAINING PROGRAM

## 2025-08-25 PROCEDURE — 99213 OFFICE O/P EST LOW 20 MIN: CPT | Performed by: STUDENT IN AN ORGANIZED HEALTH CARE EDUCATION/TRAINING PROGRAM

## 2025-08-25 PROCEDURE — 1036F TOBACCO NON-USER: CPT | Performed by: STUDENT IN AN ORGANIZED HEALTH CARE EDUCATION/TRAINING PROGRAM

## 2025-08-25 PROCEDURE — 99395 PREV VISIT EST AGE 18-39: CPT | Performed by: STUDENT IN AN ORGANIZED HEALTH CARE EDUCATION/TRAINING PROGRAM

## 2025-08-25 PROCEDURE — 3079F DIAST BP 80-89 MM HG: CPT | Performed by: STUDENT IN AN ORGANIZED HEALTH CARE EDUCATION/TRAINING PROGRAM

## 2025-08-25 PROCEDURE — G8427 DOCREV CUR MEDS BY ELIG CLIN: HCPCS | Performed by: STUDENT IN AN ORGANIZED HEALTH CARE EDUCATION/TRAINING PROGRAM

## 2025-08-25 PROCEDURE — G8417 CALC BMI ABV UP PARAM F/U: HCPCS | Performed by: STUDENT IN AN ORGANIZED HEALTH CARE EDUCATION/TRAINING PROGRAM

## 2025-08-25 RX ORDER — LITHIUM CARBONATE 300 MG/1
300 TABLET, FILM COATED, EXTENDED RELEASE ORAL DAILY
COMMUNITY
Start: 2025-08-22

## 2025-08-25 ASSESSMENT — PATIENT HEALTH QUESTIONNAIRE - PHQ9
1. LITTLE INTEREST OR PLEASURE IN DOING THINGS: MORE THAN HALF THE DAYS
SUM OF ALL RESPONSES TO PHQ QUESTIONS 1-9: 13
10. IF YOU CHECKED OFF ANY PROBLEMS, HOW DIFFICULT HAVE THESE PROBLEMS MADE IT FOR YOU TO DO YOUR WORK, TAKE CARE OF THINGS AT HOME, OR GET ALONG WITH OTHER PEOPLE: VERY DIFFICULT
9. THOUGHTS THAT YOU WOULD BE BETTER OFF DEAD, OR OF HURTING YOURSELF: NOT AT ALL
SUM OF ALL RESPONSES TO PHQ QUESTIONS 1-9: 13
8. MOVING OR SPEAKING SO SLOWLY THAT OTHER PEOPLE COULD HAVE NOTICED. OR THE OPPOSITE, BEING SO FIGETY OR RESTLESS THAT YOU HAVE BEEN MOVING AROUND A LOT MORE THAN USUAL: NOT AT ALL
4. FEELING TIRED OR HAVING LITTLE ENERGY: MORE THAN HALF THE DAYS
2. FEELING DOWN, DEPRESSED OR HOPELESS: MORE THAN HALF THE DAYS
6. FEELING BAD ABOUT YOURSELF - OR THAT YOU ARE A FAILURE OR HAVE LET YOURSELF OR YOUR FAMILY DOWN: MORE THAN HALF THE DAYS
3. TROUBLE FALLING OR STAYING ASLEEP: MORE THAN HALF THE DAYS
5. POOR APPETITE OR OVEREATING: SEVERAL DAYS
SUM OF ALL RESPONSES TO PHQ QUESTIONS 1-9: 13
7. TROUBLE CONCENTRATING ON THINGS, SUCH AS READING THE NEWSPAPER OR WATCHING TELEVISION: MORE THAN HALF THE DAYS
SUM OF ALL RESPONSES TO PHQ QUESTIONS 1-9: 13

## 2025-08-25 ASSESSMENT — ENCOUNTER SYMPTOMS
COUGH: 0
DIARRHEA: 0
CONSTIPATION: 0
SHORTNESS OF BREATH: 0

## 2025-08-29 DIAGNOSIS — E55.9 VITAMIN D DEFICIENCY: ICD-10-CM

## 2025-09-02 RX ORDER — CHOLECALCIFEROL (VITAMIN D3) 25 MCG
1 TABLET ORAL DAILY
Qty: 90 TABLET | Refills: 0 | Status: SHIPPED | OUTPATIENT
Start: 2025-09-02

## (undated) DEVICE — TUBE SUCT LAPSCP

## (undated) DEVICE — ARM DRAPE

## (undated) DEVICE — PROGRASP FORCEPS: Brand: ENDOWRIST

## (undated) DEVICE — TOWEL,STOP FLAG GOLD N-W: Brand: MEDLINE

## (undated) DEVICE — SYRINGE MED 10ML SLIP TIP BLNT FILL AND LUERLOCK DISP

## (undated) DEVICE — FENESTRATED BIPOLAR FORCEPS: Brand: ENDOWRIST

## (undated) DEVICE — STANDARD HYPODERMIC NEEDLE,POLYPROPYLENE HUB: Brand: MONOJECT

## (undated) DEVICE — 60 ML SYRINGE,REGULAR TIP: Brand: MONOJECT

## (undated) DEVICE — ROBOTIC: Brand: MEDLINE INDUSTRIES, INC.

## (undated) DEVICE — GLOVE ORANGE PI 7 1/2   MSG9075

## (undated) DEVICE — BLADE 1884004HR TRICUT 5PK M4 4MM ROTATE: Brand: TRICUT

## (undated) DEVICE — BLADE 1882940HR 5PK M4 INF TURB 2.9MM: Brand: STRAIGHTSHOT

## (undated) DEVICE — SUTURE VCRL SZ 0 L54IN ABSRB VLT W/O NDL POLYGLACTIN 910 J616H

## (undated) DEVICE — GLOVE ORANGE PI 7   MSG9070

## (undated) DEVICE — BAG SPEC REM 224ML W4XL6IN DIA10MM 1 HND GYN DISP ENDOPCH

## (undated) DEVICE — LARGE HEM-O-LOK CLIP APPLIER: Brand: ENDOWRIST

## (undated) DEVICE — DRAPE,INSTRUMENT,MAGNETIC,10X16: Brand: MEDLINE

## (undated) DEVICE — SYRINGE MED 10ML TRNSLUC BRL PLUNG BLK MRK POLYPR CTRL

## (undated) DEVICE — TOWEL,OR,DSP,ST,BLUE,DLX,8/PK,10PK/CS: Brand: MEDLINE

## (undated) DEVICE — SOLUTION IV 1000ML 0.9% SOD CHL

## (undated) DEVICE — 40583 XL ADVANCED TRENDELENBURG POSITIONING KIT: Brand: 40583 XL ADVANCED TRENDELENBURG POSITIONING KIT

## (undated) DEVICE — TROCAR: Brand: KII FIOS FIRST ENTRY

## (undated) DEVICE — SYSTEM SMK EVAC LAP TBNG FILTER HSNG BENT STYL PNK SEE CLR

## (undated) DEVICE — NEEDLE INSUF L150MM DIA2MM DISP FOR PNEUMOPERI ENDOPATH

## (undated) DEVICE — PUMP SUC IRR TBNG L10FT W/ HNDPC ASSEMB STRYKEFLOW 2

## (undated) DEVICE — PAD,EYE,1-5/8X2 5/8,STERILE,LF,1/PK: Brand: MEDLINE

## (undated) DEVICE — TUBING, SUCTION, 1/4" X 12', STRAIGHT: Brand: MEDLINE

## (undated) DEVICE — SPONGE,NEURO,1"X3",XR,STRL,LF,10/PK: Brand: MEDLINE

## (undated) DEVICE — SOLUTION ANTIFOG VIS SYS CLEARIFY LAPSCP

## (undated) DEVICE — GLOVE SURG SZ 75 L12IN THK75MIL DK GRN LTX FREE

## (undated) DEVICE — PERMANENT CAUTERY HOOK: Brand: ENDOWRIST

## (undated) DEVICE — Z DISCONTINUED NEEDLE HYPO 22GA L1.5IN BLK POLYPR HUB S STL REG BVL STR - SEE COMMENT

## (undated) DEVICE — SOLUTION INJ LR VISIV 1000ML BG

## (undated) DEVICE — BLADELESS OBTURATOR, LONG: Brand: WECK VISTA

## (undated) DEVICE — TISSUE RETRIEVAL SYSTEM: Brand: INZII RETRIEVAL SYSTEM

## (undated) DEVICE — CANNULA SEAL

## (undated) DEVICE — BLADE,CARBON-STEEL,15,STRL,DISPOSABLE,TB: Brand: MEDLINE

## (undated) DEVICE — BLADE,CARBON-STEEL,11,STRL,DISPOSABLE,TB: Brand: MEDLINE

## (undated) DEVICE — NASAL FESS: Brand: MEDLINE INDUSTRIES, INC.

## (undated) DEVICE — Device

## (undated) DEVICE — SUTURE COAT VCRL SZ 4-0 L18IN ABSRB UD L19MM PS-2 1/2 CIR J496G